# Patient Record
Sex: MALE | Race: BLACK OR AFRICAN AMERICAN | Employment: UNEMPLOYED | URBAN - METROPOLITAN AREA
[De-identification: names, ages, dates, MRNs, and addresses within clinical notes are randomized per-mention and may not be internally consistent; named-entity substitution may affect disease eponyms.]

---

## 2017-02-06 ENCOUNTER — OFFICE VISIT (OUTPATIENT)
Dept: FAMILY MEDICINE CLINIC | Age: 57
End: 2017-02-06

## 2017-02-06 VITALS
DIASTOLIC BLOOD PRESSURE: 80 MMHG | HEART RATE: 50 BPM | WEIGHT: 237.6 LBS | BODY MASS INDEX: 32.18 KG/M2 | HEIGHT: 72 IN | SYSTOLIC BLOOD PRESSURE: 130 MMHG

## 2017-02-06 DIAGNOSIS — I10 ESSENTIAL HYPERTENSION: Primary | ICD-10-CM

## 2017-02-06 DIAGNOSIS — E66.9 OBESITY (BMI 30.0-34.9): ICD-10-CM

## 2017-02-06 DIAGNOSIS — F43.21 GRIEF: ICD-10-CM

## 2017-02-06 DIAGNOSIS — E78.00 PURE HYPERCHOLESTEROLEMIA: ICD-10-CM

## 2017-02-06 DIAGNOSIS — R35.0 URINARY FREQUENCY: ICD-10-CM

## 2017-02-06 PROCEDURE — 99214 OFFICE O/P EST MOD 30 MIN: CPT | Performed by: FAMILY MEDICINE

## 2017-02-06 RX ORDER — FINASTERIDE 5 MG/1
5 TABLET, FILM COATED ORAL DAILY
Qty: 30 TABLET | Refills: 5 | Status: SHIPPED | OUTPATIENT
Start: 2017-02-06 | End: 2017-06-13 | Stop reason: SDUPTHER

## 2017-02-06 RX ORDER — LISINOPRIL 40 MG/1
40 TABLET ORAL DAILY
Qty: 30 TABLET | Refills: 5 | Status: SHIPPED | OUTPATIENT
Start: 2017-02-06 | End: 2017-06-13 | Stop reason: SDUPTHER

## 2017-02-06 RX ORDER — DOXAZOSIN MESYLATE 4 MG/1
4 TABLET ORAL DAILY
Qty: 30 TABLET | Refills: 5 | Status: SHIPPED | OUTPATIENT
Start: 2017-02-06 | End: 2017-06-13 | Stop reason: SDUPTHER

## 2017-02-06 RX ORDER — ATORVASTATIN CALCIUM 10 MG/1
10 TABLET, FILM COATED ORAL DAILY
Qty: 30 TABLET | Refills: 5 | Status: ON HOLD | OUTPATIENT
Start: 2017-02-06 | End: 2017-06-02 | Stop reason: HOSPADM

## 2017-02-06 RX ORDER — METOPROLOL SUCCINATE 200 MG/1
200 TABLET, EXTENDED RELEASE ORAL DAILY
Qty: 30 TABLET | Refills: 5 | Status: SHIPPED | OUTPATIENT
Start: 2017-02-06 | End: 2017-06-21 | Stop reason: DRUGHIGH

## 2017-02-06 ASSESSMENT — ENCOUNTER SYMPTOMS
SHORTNESS OF BREATH: 0
CHEST TIGHTNESS: 0

## 2017-06-05 ENCOUNTER — TELEPHONE (OUTPATIENT)
Dept: FAMILY MEDICINE CLINIC | Age: 57
End: 2017-06-05

## 2017-06-07 ENCOUNTER — OFFICE VISIT (OUTPATIENT)
Dept: FAMILY MEDICINE CLINIC | Age: 57
End: 2017-06-07

## 2017-06-07 VITALS
SYSTOLIC BLOOD PRESSURE: 118 MMHG | WEIGHT: 236.4 LBS | DIASTOLIC BLOOD PRESSURE: 72 MMHG | HEIGHT: 72 IN | HEART RATE: 74 BPM | BODY MASS INDEX: 32.02 KG/M2

## 2017-06-07 DIAGNOSIS — Z09 HOSPITAL DISCHARGE FOLLOW-UP: Primary | ICD-10-CM

## 2017-06-07 DIAGNOSIS — R07.9 CHEST PAIN, UNSPECIFIED TYPE: ICD-10-CM

## 2017-06-07 DIAGNOSIS — I25.10 ASCVD (ARTERIOSCLEROTIC CARDIOVASCULAR DISEASE): ICD-10-CM

## 2017-06-07 PROCEDURE — 99213 OFFICE O/P EST LOW 20 MIN: CPT | Performed by: FAMILY MEDICINE

## 2017-06-07 ASSESSMENT — PATIENT HEALTH QUESTIONNAIRE - PHQ9
SUM OF ALL RESPONSES TO PHQ QUESTIONS 1-9: 0
1. LITTLE INTEREST OR PLEASURE IN DOING THINGS: 0
SUM OF ALL RESPONSES TO PHQ9 QUESTIONS 1 & 2: 0
2. FEELING DOWN, DEPRESSED OR HOPELESS: 0

## 2017-06-13 ENCOUNTER — OFFICE VISIT (OUTPATIENT)
Dept: CARDIOLOGY CLINIC | Age: 57
End: 2017-06-13

## 2017-06-13 VITALS
HEIGHT: 72 IN | WEIGHT: 239.4 LBS | RESPIRATION RATE: 16 BRPM | HEART RATE: 64 BPM | DIASTOLIC BLOOD PRESSURE: 78 MMHG | BODY MASS INDEX: 32.43 KG/M2 | SYSTOLIC BLOOD PRESSURE: 126 MMHG

## 2017-06-13 DIAGNOSIS — I10 ESSENTIAL HYPERTENSION: ICD-10-CM

## 2017-06-13 DIAGNOSIS — R35.0 URINARY FREQUENCY: ICD-10-CM

## 2017-06-13 DIAGNOSIS — E78.5 DYSLIPIDEMIA: ICD-10-CM

## 2017-06-13 DIAGNOSIS — R07.9 CHEST PAIN, UNSPECIFIED TYPE: ICD-10-CM

## 2017-06-13 DIAGNOSIS — I25.118 CORONARY ARTERY DISEASE OF NATIVE ARTERY OF NATIVE HEART WITH STABLE ANGINA PECTORIS (HCC): Primary | ICD-10-CM

## 2017-06-13 PROCEDURE — 99214 OFFICE O/P EST MOD 30 MIN: CPT | Performed by: INTERNAL MEDICINE

## 2017-06-13 PROCEDURE — 93000 ELECTROCARDIOGRAM COMPLETE: CPT | Performed by: INTERNAL MEDICINE

## 2017-06-13 RX ORDER — DOXAZOSIN MESYLATE 4 MG/1
4 TABLET ORAL DAILY
Qty: 30 TABLET | Refills: 5 | Status: ON HOLD | OUTPATIENT
Start: 2017-06-13 | End: 2018-05-23 | Stop reason: HOSPADM

## 2017-06-13 RX ORDER — LISINOPRIL 40 MG/1
40 TABLET ORAL DAILY
Qty: 30 TABLET | Refills: 5 | Status: ON HOLD | OUTPATIENT
Start: 2017-06-13 | End: 2018-05-23

## 2017-06-13 RX ORDER — FINASTERIDE 5 MG/1
5 TABLET, FILM COATED ORAL DAILY
Qty: 30 TABLET | Refills: 5 | Status: ON HOLD | OUTPATIENT
Start: 2017-06-13 | End: 2021-08-26 | Stop reason: SDUPTHER

## 2017-06-13 RX ORDER — ISOSORBIDE MONONITRATE 30 MG/1
30 TABLET, EXTENDED RELEASE ORAL DAILY
Qty: 30 TABLET | Refills: 3 | Status: SHIPPED | OUTPATIENT
Start: 2017-06-13 | End: 2017-08-29 | Stop reason: SDUPTHER

## 2017-06-13 RX ORDER — CLOPIDOGREL BISULFATE 75 MG/1
75 TABLET ORAL DAILY
Qty: 30 TABLET | Refills: 5 | Status: ON HOLD | OUTPATIENT
Start: 2017-06-13 | End: 2018-05-23 | Stop reason: HOSPADM

## 2017-06-19 ENCOUNTER — TELEPHONE (OUTPATIENT)
Dept: CARDIOLOGY CLINIC | Age: 57
End: 2017-06-19

## 2017-06-19 DIAGNOSIS — I10 ESSENTIAL HYPERTENSION: ICD-10-CM

## 2017-06-19 DIAGNOSIS — R07.89 OTHER CHEST PAIN: ICD-10-CM

## 2017-06-19 DIAGNOSIS — E78.5 DYSLIPIDEMIA: ICD-10-CM

## 2017-06-19 DIAGNOSIS — Z98.61 S/P PTCA (PERCUTANEOUS TRANSLUMINAL CORONARY ANGIOPLASTY): Primary | ICD-10-CM

## 2017-06-20 ENCOUNTER — TELEPHONE (OUTPATIENT)
Dept: CARDIOLOGY CLINIC | Age: 57
End: 2017-06-20

## 2017-06-21 ENCOUNTER — NURSE ONLY (OUTPATIENT)
Dept: CARDIOLOGY CLINIC | Age: 57
End: 2017-06-21

## 2017-06-21 VITALS
HEIGHT: 72 IN | HEART RATE: 50 BPM | SYSTOLIC BLOOD PRESSURE: 102 MMHG | BODY MASS INDEX: 32.23 KG/M2 | WEIGHT: 238 LBS | DIASTOLIC BLOOD PRESSURE: 68 MMHG | OXYGEN SATURATION: 97 %

## 2017-06-21 DIAGNOSIS — I10 ESSENTIAL HYPERTENSION: ICD-10-CM

## 2017-06-21 RX ORDER — METOPROLOL SUCCINATE 100 MG/1
200 TABLET, EXTENDED RELEASE ORAL DAILY
Qty: 30 TABLET | Refills: 3 | Status: SHIPPED | OUTPATIENT
Start: 2017-06-21 | End: 2017-07-17 | Stop reason: DRUGHIGH

## 2017-06-27 ENCOUNTER — PROCEDURE VISIT (OUTPATIENT)
Dept: CARDIOLOGY CLINIC | Age: 57
End: 2017-06-27

## 2017-06-27 DIAGNOSIS — R07.89 OTHER CHEST PAIN: ICD-10-CM

## 2017-06-27 DIAGNOSIS — E78.5 DYSLIPIDEMIA: ICD-10-CM

## 2017-06-27 DIAGNOSIS — Z98.61 S/P PTCA (PERCUTANEOUS TRANSLUMINAL CORONARY ANGIOPLASTY): Primary | ICD-10-CM

## 2017-06-27 DIAGNOSIS — I10 ESSENTIAL HYPERTENSION: ICD-10-CM

## 2017-06-27 PROCEDURE — 93015 CV STRESS TEST SUPVJ I&R: CPT | Performed by: INTERNAL MEDICINE

## 2017-06-29 ENCOUNTER — TELEPHONE (OUTPATIENT)
Dept: CARDIOLOGY CLINIC | Age: 57
End: 2017-06-29

## 2017-06-29 DIAGNOSIS — Z98.61 S/P PTCA (PERCUTANEOUS TRANSLUMINAL CORONARY ANGIOPLASTY): Primary | ICD-10-CM

## 2017-07-12 ENCOUNTER — TELEPHONE (OUTPATIENT)
Dept: CARDIOLOGY CLINIC | Age: 57
End: 2017-07-12

## 2017-07-17 ENCOUNTER — OFFICE VISIT (OUTPATIENT)
Dept: CARDIOLOGY CLINIC | Age: 57
End: 2017-07-17

## 2017-07-17 VITALS
BODY MASS INDEX: 32.21 KG/M2 | DIASTOLIC BLOOD PRESSURE: 80 MMHG | SYSTOLIC BLOOD PRESSURE: 132 MMHG | WEIGHT: 237.8 LBS | HEIGHT: 72 IN | HEART RATE: 76 BPM

## 2017-07-17 DIAGNOSIS — I25.118 CORONARY ARTERY DISEASE OF NATIVE ARTERY OF NATIVE HEART WITH STABLE ANGINA PECTORIS (HCC): Primary | ICD-10-CM

## 2017-07-17 DIAGNOSIS — I10 ESSENTIAL HYPERTENSION: ICD-10-CM

## 2017-07-17 DIAGNOSIS — E78.5 DYSLIPIDEMIA: ICD-10-CM

## 2017-07-17 DIAGNOSIS — F32.2 SEVERE SINGLE CURRENT EPISODE OF MAJOR DEPRESSIVE DISORDER, WITHOUT PSYCHOTIC FEATURES (HCC): ICD-10-CM

## 2017-07-17 DIAGNOSIS — R07.89 OTHER CHEST PAIN: ICD-10-CM

## 2017-07-17 PROCEDURE — 99214 OFFICE O/P EST MOD 30 MIN: CPT | Performed by: INTERNAL MEDICINE

## 2017-07-17 RX ORDER — METOPROLOL SUCCINATE 100 MG/1
100 TABLET, EXTENDED RELEASE ORAL DAILY
Qty: 30 TABLET | Refills: 3 | Status: SHIPPED | OUTPATIENT
Start: 2017-07-17 | End: 2017-08-29 | Stop reason: ALTCHOICE

## 2017-07-27 ENCOUNTER — TELEPHONE (OUTPATIENT)
Dept: FAMILY MEDICINE CLINIC | Age: 57
End: 2017-07-27

## 2017-07-31 ENCOUNTER — OFFICE VISIT (OUTPATIENT)
Dept: FAMILY MEDICINE CLINIC | Age: 57
End: 2017-07-31

## 2017-07-31 VITALS
HEART RATE: 74 BPM | HEIGHT: 72 IN | DIASTOLIC BLOOD PRESSURE: 60 MMHG | BODY MASS INDEX: 31.61 KG/M2 | WEIGHT: 233.4 LBS | SYSTOLIC BLOOD PRESSURE: 100 MMHG

## 2017-07-31 DIAGNOSIS — I25.118 CORONARY ARTERY DISEASE OF NATIVE ARTERY OF NATIVE HEART WITH STABLE ANGINA PECTORIS (HCC): ICD-10-CM

## 2017-07-31 DIAGNOSIS — F32.A DEPRESSION, UNSPECIFIED DEPRESSION TYPE: ICD-10-CM

## 2017-07-31 DIAGNOSIS — R25.2 CRAMPS OF LOWER EXTREMITY, UNSPECIFIED LATERALITY: ICD-10-CM

## 2017-07-31 DIAGNOSIS — E66.9 OBESITY (BMI 30.0-34.9): ICD-10-CM

## 2017-07-31 DIAGNOSIS — H61.23 IMPACTED CERUMEN OF BOTH EARS: Primary | ICD-10-CM

## 2017-07-31 DIAGNOSIS — I10 ESSENTIAL HYPERTENSION: ICD-10-CM

## 2017-07-31 PROCEDURE — 69210 REMOVE IMPACTED EAR WAX UNI: CPT | Performed by: FAMILY MEDICINE

## 2017-07-31 PROCEDURE — 99214 OFFICE O/P EST MOD 30 MIN: CPT | Performed by: FAMILY MEDICINE

## 2017-07-31 RX ORDER — AMLODIPINE BESYLATE 5 MG/1
1 TABLET ORAL DAILY
COMMUNITY
Start: 2017-07-01 | End: 2017-08-01 | Stop reason: SDUPTHER

## 2017-07-31 RX ORDER — BUPROPION HYDROCHLORIDE 300 MG/1
300 TABLET ORAL EVERY MORNING
Qty: 30 TABLET | Refills: 0 | Status: SHIPPED | OUTPATIENT
Start: 2017-07-31 | End: 2017-09-29

## 2017-07-31 RX ORDER — BUPROPION HYDROCHLORIDE 300 MG/1
300 TABLET ORAL EVERY MORNING
COMMUNITY
End: 2017-07-31 | Stop reason: SDUPTHER

## 2017-07-31 ASSESSMENT — ENCOUNTER SYMPTOMS: SHORTNESS OF BREATH: 0

## 2017-08-01 RX ORDER — ATORVASTATIN CALCIUM 40 MG/1
40 TABLET, FILM COATED ORAL DAILY
Qty: 30 TABLET | Refills: 2 | Status: SHIPPED | OUTPATIENT
Start: 2017-08-01 | End: 2017-09-29 | Stop reason: SDUPTHER

## 2017-08-01 RX ORDER — AMLODIPINE BESYLATE 5 MG/1
5 TABLET ORAL DAILY
Qty: 30 TABLET | Refills: 2 | Status: SHIPPED | OUTPATIENT
Start: 2017-08-01 | End: 2017-09-29 | Stop reason: SDUPTHER

## 2017-08-09 ENCOUNTER — PROCEDURE VISIT (OUTPATIENT)
Dept: CARDIOLOGY CLINIC | Age: 57
End: 2017-08-09

## 2017-08-09 DIAGNOSIS — R53.83 FATIGUE, UNSPECIFIED TYPE: Primary | ICD-10-CM

## 2017-08-09 DIAGNOSIS — R07.89 OTHER CHEST PAIN: ICD-10-CM

## 2017-08-09 DIAGNOSIS — I10 ESSENTIAL HYPERTENSION: ICD-10-CM

## 2017-08-09 DIAGNOSIS — F32.2 SEVERE SINGLE CURRENT EPISODE OF MAJOR DEPRESSIVE DISORDER, WITHOUT PSYCHOTIC FEATURES (HCC): ICD-10-CM

## 2017-08-09 DIAGNOSIS — E78.5 DYSLIPIDEMIA: ICD-10-CM

## 2017-08-09 DIAGNOSIS — I25.118 CORONARY ARTERY DISEASE OF NATIVE ARTERY OF NATIVE HEART WITH STABLE ANGINA PECTORIS (HCC): ICD-10-CM

## 2017-08-09 LAB
LV EF: 42 %
LV EF: 58 %
LVEF MODALITY: NORMAL
LVEF MODALITY: NORMAL

## 2017-08-09 PROCEDURE — 93306 TTE W/DOPPLER COMPLETE: CPT | Performed by: INTERNAL MEDICINE

## 2017-08-09 PROCEDURE — A9500 TC99M SESTAMIBI: HCPCS | Performed by: INTERNAL MEDICINE

## 2017-08-09 PROCEDURE — 93017 CV STRESS TEST TRACING ONLY: CPT | Performed by: INTERNAL MEDICINE

## 2017-08-09 PROCEDURE — 93016 CV STRESS TEST SUPVJ ONLY: CPT | Performed by: INTERNAL MEDICINE

## 2017-08-09 PROCEDURE — 93018 CV STRESS TEST I&R ONLY: CPT | Performed by: INTERNAL MEDICINE

## 2017-08-09 PROCEDURE — 78452 HT MUSCLE IMAGE SPECT MULT: CPT | Performed by: INTERNAL MEDICINE

## 2017-08-10 ENCOUNTER — TELEPHONE (OUTPATIENT)
Dept: CARDIOLOGY CLINIC | Age: 57
End: 2017-08-10

## 2017-08-16 ENCOUNTER — TELEPHONE (OUTPATIENT)
Dept: CARDIOLOGY CLINIC | Age: 57
End: 2017-08-16

## 2017-08-18 ENCOUNTER — OFFICE VISIT (OUTPATIENT)
Dept: FAMILY MEDICINE CLINIC | Age: 57
End: 2017-08-18

## 2017-08-18 VITALS
HEIGHT: 72 IN | WEIGHT: 236.4 LBS | HEART RATE: 76 BPM | SYSTOLIC BLOOD PRESSURE: 120 MMHG | BODY MASS INDEX: 32.02 KG/M2 | DIASTOLIC BLOOD PRESSURE: 72 MMHG

## 2017-08-18 DIAGNOSIS — I25.118 CORONARY ARTERY DISEASE OF NATIVE ARTERY OF NATIVE HEART WITH STABLE ANGINA PECTORIS (HCC): ICD-10-CM

## 2017-08-18 DIAGNOSIS — M54.31 RIGHT SIDED SCIATICA: Primary | ICD-10-CM

## 2017-08-18 PROCEDURE — 99214 OFFICE O/P EST MOD 30 MIN: CPT | Performed by: FAMILY MEDICINE

## 2017-08-18 RX ORDER — GABAPENTIN 100 MG/1
100 CAPSULE ORAL DAILY
Qty: 30 CAPSULE | Refills: 0 | Status: SHIPPED | OUTPATIENT
Start: 2017-08-18 | End: 2017-09-29

## 2017-08-18 RX ORDER — METHYLPREDNISOLONE 4 MG/1
TABLET ORAL
Qty: 1 TABLET | Refills: 0 | Status: SHIPPED | OUTPATIENT
Start: 2017-08-18 | End: 2017-08-24

## 2017-08-18 ASSESSMENT — ENCOUNTER SYMPTOMS: BACK PAIN: 1

## 2017-08-25 ENCOUNTER — OFFICE VISIT (OUTPATIENT)
Dept: FAMILY MEDICINE CLINIC | Age: 57
End: 2017-08-25

## 2017-08-25 VITALS
HEART RATE: 74 BPM | HEIGHT: 72 IN | DIASTOLIC BLOOD PRESSURE: 80 MMHG | WEIGHT: 239 LBS | BODY MASS INDEX: 32.37 KG/M2 | SYSTOLIC BLOOD PRESSURE: 120 MMHG
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
## 2017-08-25 DIAGNOSIS — M54.41 ACUTE RIGHT-SIDED LOW BACK PAIN WITH RIGHT-SIDED SCIATICA: Primary | ICD-10-CM

## 2017-08-25 DIAGNOSIS — R29.2 ABNORMAL DEEP TENDON REFLEX: ICD-10-CM

## 2017-08-25 DIAGNOSIS — R29.898 RIGHT LEG WEAKNESS: ICD-10-CM

## 2017-08-25 PROCEDURE — 99213 OFFICE O/P EST LOW 20 MIN: CPT | Performed by: FAMILY MEDICINE

## 2017-08-25 RX ORDER — GABAPENTIN 300 MG/1
300 CAPSULE ORAL 3 TIMES DAILY
Qty: 90 CAPSULE | Refills: 0 | Status: SHIPPED | OUTPATIENT
Start: 2017-08-25 | End: 2017-09-29

## 2017-08-25 RX ORDER — TRAMADOL HYDROCHLORIDE 50 MG/1
50 TABLET ORAL EVERY 6 HOURS PRN
Qty: 28 TABLET | Refills: 0 | Status: SHIPPED | OUTPATIENT
Start: 2017-08-25 | End: 2017-09-15 | Stop reason: SDUPTHER

## 2017-08-25 ASSESSMENT — ENCOUNTER SYMPTOMS: BACK PAIN: 1

## 2017-08-29 ENCOUNTER — OFFICE VISIT (OUTPATIENT)
Dept: CARDIOLOGY CLINIC | Age: 57
End: 2017-08-29

## 2017-08-29 ENCOUNTER — TELEPHONE (OUTPATIENT)
Dept: CARDIOLOGY CLINIC | Age: 57
End: 2017-08-29

## 2017-08-29 VITALS
WEIGHT: 239 LBS | OXYGEN SATURATION: 97 % | BODY MASS INDEX: 32.37 KG/M2 | DIASTOLIC BLOOD PRESSURE: 80 MMHG | HEIGHT: 72 IN | SYSTOLIC BLOOD PRESSURE: 134 MMHG | HEART RATE: 70 BPM

## 2017-08-29 DIAGNOSIS — I25.118 CORONARY ARTERY DISEASE OF NATIVE ARTERY OF NATIVE HEART WITH STABLE ANGINA PECTORIS (HCC): ICD-10-CM

## 2017-08-29 DIAGNOSIS — E66.9 OBESITY (BMI 30.0-34.9): ICD-10-CM

## 2017-08-29 DIAGNOSIS — E78.00 PURE HYPERCHOLESTEROLEMIA: ICD-10-CM

## 2017-08-29 DIAGNOSIS — R07.89 OTHER CHEST PAIN: ICD-10-CM

## 2017-08-29 DIAGNOSIS — N40.1 BENIGN NON-NODULAR PROSTATIC HYPERPLASIA WITH LOWER URINARY TRACT SYMPTOMS: ICD-10-CM

## 2017-08-29 DIAGNOSIS — E78.5 DYSLIPIDEMIA: ICD-10-CM

## 2017-08-29 DIAGNOSIS — I10 ESSENTIAL HYPERTENSION: ICD-10-CM

## 2017-08-29 DIAGNOSIS — I25.110 CORONARY ARTERY DISEASE INVOLVING NATIVE CORONARY ARTERY OF NATIVE HEART WITH UNSTABLE ANGINA PECTORIS (HCC): Primary | ICD-10-CM

## 2017-08-29 PROCEDURE — 99214 OFFICE O/P EST MOD 30 MIN: CPT | Performed by: INTERNAL MEDICINE

## 2017-08-29 RX ORDER — METOPROLOL SUCCINATE 50 MG/1
50 TABLET, EXTENDED RELEASE ORAL DAILY
Qty: 30 TABLET | Refills: 3 | Status: SHIPPED | OUTPATIENT
Start: 2017-08-29 | End: 2018-01-24 | Stop reason: SDUPTHER

## 2017-08-29 RX ORDER — ISOSORBIDE MONONITRATE 30 MG/1
60 TABLET, EXTENDED RELEASE ORAL DAILY
Qty: 30 TABLET | Refills: 3 | Status: SHIPPED | OUTPATIENT
Start: 2017-08-29 | End: 2018-01-24 | Stop reason: SDUPTHER

## 2017-08-29 RX ORDER — RANOLAZINE 500 MG/1
500 TABLET, EXTENDED RELEASE ORAL 2 TIMES DAILY
Qty: 60 TABLET | Refills: 3 | Status: SHIPPED | OUTPATIENT
Start: 2017-08-29 | End: 2018-05-03 | Stop reason: SDUPTHER

## 2017-08-30 ENCOUNTER — TELEPHONE (OUTPATIENT)
Dept: FAMILY MEDICINE CLINIC | Age: 57
End: 2017-08-30

## 2017-09-05 ENCOUNTER — TELEPHONE (OUTPATIENT)
Dept: CARDIOLOGY CLINIC | Age: 57
End: 2017-09-05

## 2017-09-05 ENCOUNTER — HOSPITAL ENCOUNTER (OUTPATIENT)
Dept: MRI IMAGING | Age: 57
Discharge: HOME | End: 2017-09-05
Attending: FAMILY MEDICINE | Admitting: FAMILY MEDICINE

## 2017-09-05 DIAGNOSIS — R29.898 RIGHT LEG WEAKNESS: ICD-10-CM

## 2017-09-05 DIAGNOSIS — M54.41 LOW BACK PAIN WITH RIGHT-SIDED SCIATICA: ICD-10-CM
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
## 2017-09-05 DIAGNOSIS — M54.41 ACUTE RIGHT-SIDED LOW BACK PAIN WITH RIGHT-SIDED SCIATICA: ICD-10-CM

## 2017-09-07 PROBLEM — M47.816 OSTEOARTHRITIS OF LUMBAR SPINE: Status: ACTIVE | Noted: 2017-09-07

## 2017-09-08 ENCOUNTER — HOSPITAL ENCOUNTER (OUTPATIENT)
Dept: PHYSICAL THERAPY | Age: 57
Discharge: HOME | End: 2017-09-30
Attending: FAMILY MEDICINE | Admitting: FAMILY MEDICINE

## 2017-09-08 NOTE — FLOWSHEET NOTE
Physical Therapy Daily Treatment Note  Date:  2017    Patient Name:  Arlen Carrero    :  1960  MRN: [id number]  Restrictions/Precautions:  none  Diagnosis: right side low back , right leg weakness, falls     Insurance/Certification information:  [place] [employer] v[employer]  Next Physician Visit:  Referring Physician:  Adali Mcghee Delay  Visit# / total visits: 1 /     10             Initial Pain level:3 to 8 /10     Subjective:    Clinical Presentation: Unsure Pt presents with 3 to 8/10 back and leg pain with weakness of right leg. He has absent right leg DTRs, decreased mm bulk of right calf mm,  weakness of all right leg mm of 3+/5?  ,  Pt moves slowly and his gait changes slightly,  He complains of pain with 40* SLR supine, yet can long sit at 70* SLR motion comfortably/    Pt started with strengthening exs fto back and leg mm  Any changes to Ambulatory Summary Sheet? Goals:     Long term goals  Time Frame for Long term goals : 60 days  Long term goal 1: decrease pain by 50%  Long term goal 2: no falls  Long term goal 3: indep with home program  Long term goal 4: improve oswestry score by 5 points   Patient's goal: less pain and more leg strength  Skilled PT activities: Date 17 #1 Date Date Date     Outcome measure used          On visit#  28 oswestry         Lat pull backs with lat bar  Elbows flexed   Elbows ext  6 plates 41U  5 plates 96Q        Lat pull downs sitting facing out         Pelvic tilts supine        slow marches supine          raising up onto toes   Both leg stance 2x 10 weakness right leg        Step ups 4 inch step each leg         Bike or nu step sci fit?  May add                     Suggested serola SI belt for work at [employer], pt to order on his own if he wants                                              Progress/goals assessment (every 10 visits) by  PT           HEP: Above exs      Objective   findings: seenpresentation      Provider

## 2017-09-08 NOTE — PROGRESS NOTES
Difficult urinating/bowel movements   Taking deep breaths    Sensation    Complaint of numbness or tingling?:     Right side of hip, right lateral calf. AROM     Flexion: 20*   Extension:10*    Left Sidebendin*       Right Sidebendin*     Reflexes   2 left knee,  0 right knee and both ankles    Strength testing Left  Leg 5/5,    All muscle groups are 3+/5for  Right LE,  Good quad and hamstring mm tone and bulk , equal to left. Pt has decreased mm tone and bulk with right gastroc mm. Special Testing:     SLR supine: 30* pain right leg,  SLR sitting 70* no pain  Slump: negative  NOE: stiff complains of pain right   FADIR: stiff complains of pain right hip    Hip Screen: All hip AROM is WNL        Palpation:            Pain noted at: lumbar , sacral iliac,   Pelvic alignment:  Symmetrical height of iliac crests, ASIS, and PSIS                         :   Leg Length:   Equal       Gait:   Pt walks without any assistive device, less weight bearing on  Right leg, walks slowly , knee hyperextends on right  I  Barriers to Learning:    None           Assessment   Conditions Requiring Skilled Therapeutic Intervention  Body structures, Functions, Activity limitations: Decreased functional mobility   Prognosis: Good  Decision Making: Medium Complexity  History: MRI. cardiac history, work and social history  Exam: rang eof motion, strength, gait, posture, DTRs, SLR  Clinical Presentation: Unsure Pt presents with 3 to 8/10 back and leg pain with weakness of right leg.   He has absent right leg DTRs, decreased mm bulk of right calf mm,  weakness of all right leg mm of 3+/5?  ,  Pt moves slowly and his gait changes slightly,  He complains of pain with 40* SLR supine, yet can long sit at 70* SLR motion comfortably/    Pt started with strengthening exs fto back and leg mm  Patient Education: written  Barriers to Learning: none  REQUIRES PT FOLLOW UP: Yes  Activity Tolerance  Activity Tolerance: Patient

## 2017-09-14 ENCOUNTER — HOSPITAL ENCOUNTER (OUTPATIENT)
Dept: PHYSICAL THERAPY | Age: 57
Discharge: HOME | End: 2017-09-14
Admitting: FAMILY MEDICINE

## 2017-09-15 ENCOUNTER — OFFICE VISIT (OUTPATIENT)
Dept: FAMILY MEDICINE CLINIC | Age: 57
End: 2017-09-15

## 2017-09-15 VITALS
HEART RATE: 80 BPM | HEIGHT: 73 IN | BODY MASS INDEX: 31.86 KG/M2 | DIASTOLIC BLOOD PRESSURE: 80 MMHG | WEIGHT: 240.4 LBS | SYSTOLIC BLOOD PRESSURE: 110 MMHG

## 2017-09-15 DIAGNOSIS — R29.898 RIGHT LEG WEAKNESS: ICD-10-CM

## 2017-09-15 DIAGNOSIS — M54.31 RIGHT SIDED SCIATICA: Primary | ICD-10-CM

## 2017-09-15 DIAGNOSIS — M54.2 NECK PAIN ON RIGHT SIDE: ICD-10-CM

## 2017-09-15 DIAGNOSIS — G89.29 OTHER CHRONIC PAIN: ICD-10-CM

## 2017-09-15 PROCEDURE — 99213 OFFICE O/P EST LOW 20 MIN: CPT | Performed by: FAMILY MEDICINE

## 2017-09-15 RX ORDER — METHOCARBAMOL 500 MG/1
500 TABLET, FILM COATED ORAL 4 TIMES DAILY
Qty: 60 TABLET | Refills: 1 | Status: SHIPPED | OUTPATIENT
Start: 2017-09-15 | End: 2018-01-08

## 2017-09-15 RX ORDER — TRAMADOL HYDROCHLORIDE 50 MG/1
50 TABLET ORAL 2 TIMES DAILY
Qty: 60 TABLET | Refills: 0 | Status: SHIPPED | OUTPATIENT
Start: 2017-09-15 | End: 2018-04-26

## 2017-09-15 ASSESSMENT — ENCOUNTER SYMPTOMS: BACK PAIN: 1

## 2017-09-22 ENCOUNTER — OFFICE VISIT (OUTPATIENT)
Dept: FAMILY MEDICINE CLINIC | Age: 57
End: 2017-09-22

## 2017-09-22 VITALS
SYSTOLIC BLOOD PRESSURE: 120 MMHG | BODY MASS INDEX: 32.21 KG/M2 | HEART RATE: 50 BPM | DIASTOLIC BLOOD PRESSURE: 82 MMHG | HEIGHT: 72 IN | WEIGHT: 237.8 LBS

## 2017-09-22 DIAGNOSIS — Z20.2 POSSIBLE EXPOSURE TO STD: Primary | ICD-10-CM

## 2017-09-22 LAB
BILIRUBIN URINE: NEGATIVE
BLOOD, URINE: NEGATIVE
CLARITY: CLEAR
COLOR: ABNORMAL
COMMENT UA: ABNORMAL
EPITHELIAL CELLS, UA: 2 /HPF (ref 0–5)
GLUCOSE URINE: NEGATIVE MG/DL
HAV IGM SER IA-ACNC: NORMAL
HEPATITIS B CORE IGM ANTIBODY: NORMAL
HEPATITIS B SURFACE ANTIGEN INTERPRETATION: NORMAL
HEPATITIS C ANTIBODY INTERPRETATION: NORMAL
HYALINE CASTS: 4 /LPF (ref 0–8)
KETONES, URINE: NEGATIVE MG/DL
LEUKOCYTE ESTERASE, URINE: ABNORMAL
MICROSCOPIC EXAMINATION: YES
NITRITE, URINE: NEGATIVE
PH UA: 6
PROTEIN UA: ABNORMAL MG/DL
RBC UA: 4 /HPF (ref 0–4)
SPECIFIC GRAVITY UA: 1.02
URINE TYPE: ABNORMAL
UROBILINOGEN, URINE: 1 E.U./DL
WBC UA: 21 /HPF (ref 0–5)

## 2017-09-22 PROCEDURE — 36415 COLL VENOUS BLD VENIPUNCTURE: CPT | Performed by: FAMILY MEDICINE

## 2017-09-22 PROCEDURE — 99213 OFFICE O/P EST LOW 20 MIN: CPT | Performed by: FAMILY MEDICINE

## 2017-09-22 PROCEDURE — 81003 URINALYSIS AUTO W/O SCOPE: CPT | Performed by: FAMILY MEDICINE

## 2017-09-23 LAB — RPR: NORMAL

## 2017-09-25 LAB
C. TRACHOMATIS DNA ,URINE: NEGATIVE
HIV-1 AND HIV-2 ANTIBODIES: NORMAL
N. GONORRHOEAE DNA, URINE: NEGATIVE

## 2017-09-29 ENCOUNTER — OFFICE VISIT (OUTPATIENT)
Dept: FAMILY MEDICINE CLINIC | Age: 57
End: 2017-09-29

## 2017-09-29 VITALS
WEIGHT: 243.6 LBS | RESPIRATION RATE: 16 BRPM | HEART RATE: 62 BPM | BODY MASS INDEX: 33.04 KG/M2 | SYSTOLIC BLOOD PRESSURE: 118 MMHG | DIASTOLIC BLOOD PRESSURE: 78 MMHG

## 2017-09-29 DIAGNOSIS — M54.31 RIGHT SIDED SCIATICA: Primary | ICD-10-CM

## 2017-09-29 DIAGNOSIS — I10 ESSENTIAL HYPERTENSION: ICD-10-CM

## 2017-09-29 PROCEDURE — 99213 OFFICE O/P EST LOW 20 MIN: CPT | Performed by: FAMILY MEDICINE

## 2017-09-29 RX ORDER — ATORVASTATIN CALCIUM 40 MG/1
40 TABLET, FILM COATED ORAL DAILY
Qty: 30 TABLET | Refills: 11 | Status: SHIPPED | OUTPATIENT
Start: 2017-09-29 | End: 2018-05-25 | Stop reason: SDUPTHER

## 2017-09-29 RX ORDER — AMLODIPINE BESYLATE 5 MG/1
5 TABLET ORAL DAILY
Qty: 30 TABLET | Refills: 11 | Status: SHIPPED | OUTPATIENT
Start: 2017-09-29 | End: 2018-01-08 | Stop reason: DRUGHIGH

## 2017-09-29 ASSESSMENT — ENCOUNTER SYMPTOMS: BACK PAIN: 1

## 2017-10-01 ENCOUNTER — HOSPITAL ENCOUNTER (OUTPATIENT)
Dept: OTHER | Age: 57
Discharge: HOME | End: 2017-10-31
Attending: FAMILY MEDICINE | Admitting: FAMILY MEDICINE

## 2018-01-08 ENCOUNTER — OFFICE VISIT (OUTPATIENT)
Dept: FAMILY MEDICINE CLINIC | Age: 58
End: 2018-01-08

## 2018-01-08 VITALS
HEIGHT: 73 IN | WEIGHT: 250 LBS | DIASTOLIC BLOOD PRESSURE: 88 MMHG | HEART RATE: 68 BPM | SYSTOLIC BLOOD PRESSURE: 140 MMHG | BODY MASS INDEX: 33.13 KG/M2

## 2018-01-08 DIAGNOSIS — E78.00 PURE HYPERCHOLESTEROLEMIA: ICD-10-CM

## 2018-01-08 DIAGNOSIS — E66.9 OBESITY (BMI 30.0-34.9): ICD-10-CM

## 2018-01-08 DIAGNOSIS — R63.5 WEIGHT GAIN: ICD-10-CM

## 2018-01-08 DIAGNOSIS — I25.110 CORONARY ARTERY DISEASE INVOLVING NATIVE CORONARY ARTERY OF NATIVE HEART WITH UNSTABLE ANGINA PECTORIS (HCC): ICD-10-CM

## 2018-01-08 DIAGNOSIS — R73.09 ELEVATED GLUCOSE: ICD-10-CM

## 2018-01-08 DIAGNOSIS — R35.0 URINARY FREQUENCY: ICD-10-CM

## 2018-01-08 DIAGNOSIS — I10 ESSENTIAL HYPERTENSION: Primary | ICD-10-CM

## 2018-01-08 LAB
BILIRUBIN, POC: NORMAL
BLOOD URINE, POC: NORMAL
CLARITY, POC: CLEAR
COLOR, POC: YELLOW
GLUCOSE URINE, POC: 100
HBA1C MFR BLD: 5.6 %
KETONES, POC: NORMAL
LEUKOCYTE EST, POC: NORMAL
NITRITE, POC: NORMAL
PH, POC: 5.5
PROTEIN, POC: 30
SPECIFIC GRAVITY, POC: 1.02
UROBILINOGEN, POC: 0.2

## 2018-01-08 PROCEDURE — 36415 COLL VENOUS BLD VENIPUNCTURE: CPT | Performed by: FAMILY MEDICINE

## 2018-01-08 PROCEDURE — 83036 HEMOGLOBIN GLYCOSYLATED A1C: CPT | Performed by: FAMILY MEDICINE

## 2018-01-08 PROCEDURE — 81003 URINALYSIS AUTO W/O SCOPE: CPT | Performed by: FAMILY MEDICINE

## 2018-01-08 PROCEDURE — 99214 OFFICE O/P EST MOD 30 MIN: CPT | Performed by: FAMILY MEDICINE

## 2018-01-08 RX ORDER — AMLODIPINE BESYLATE 10 MG/1
10 TABLET ORAL DAILY
Qty: 30 TABLET | Refills: 2 | Status: SHIPPED | OUTPATIENT
Start: 2018-01-08 | End: 2018-04-26 | Stop reason: SDUPTHER

## 2018-01-08 ASSESSMENT — ENCOUNTER SYMPTOMS: SHORTNESS OF BREATH: 0

## 2018-01-08 NOTE — PATIENT INSTRUCTIONS
A serving is 1 slice of bread, 1 ounce of dry cereal, or ½ cup of cooked rice, pasta, or cooked cereal. Try to choose whole-grain products as much as possible. · Limit lean meat, poultry, and fish to 2 servings each day. A serving is 3 ounces, about the size of a deck of cards. · Eat 4 to 5 servings of nuts, seeds, and legumes (cooked dried beans, lentils, and split peas) each week. A serving is 1/3 cup of nuts, 2 tablespoons of seeds, or ½ cup of cooked beans or peas. · Limit fats and oils to 2 to 3 servings each day. A serving is 1 teaspoon of vegetable oil or 2 tablespoons of salad dressing. · Limit sweets and added sugars to 5 servings or less a week. A serving is 1 tablespoon jelly or jam, ½ cup sorbet, or 1 cup of lemonade. · Eat less than 2,300 milligrams (mg) of sodium a day. If you limit your sodium to 1,500 mg a day, you can lower your blood pressure even more. Tips for success  · Start small. Do not try to make dramatic changes to your diet all at once. You might feel that you are missing out on your favorite foods and then be more likely to not follow the plan. Make small changes, and stick with them. Once those changes become habit, add a few more changes. · Try some of the following:  ¨ Make it a goal to eat a fruit or vegetable at every meal and at snacks. This will make it easy to get the recommended amount of fruits and vegetables each day. ¨ Try yogurt topped with fruit and nuts for a snack or healthy dessert. ¨ Add lettuce, tomato, cucumber, and onion to sandwiches. ¨ Combine a ready-made pizza crust with low-fat mozzarella cheese and lots of vegetable toppings. Try using tomatoes, squash, spinach, broccoli, carrots, cauliflower, and onions. ¨ Have a variety of cut-up vegetables with a low-fat dip as an appetizer instead of chips and dip. ¨ Sprinkle sunflower seeds or chopped almonds over salads. Or try adding chopped walnuts or almonds to cooked vegetables.   ¨ Try some vegetarian

## 2018-01-08 NOTE — PROGRESS NOTES
tablet by mouth 2 times daily 60 tablet 3    doxazosin (CARDURA) 4 MG tablet Take 1 tablet by mouth daily 30 tablet 5    finasteride (PROSCAR) 5 MG tablet Take 1 tablet by mouth daily 30 tablet 5    lisinopril (PRINIVIL;ZESTRIL) 40 MG tablet Take 1 tablet by mouth daily 30 tablet 5    clopidogrel (PLAVIX) 75 MG tablet Take 1 tablet by mouth daily 30 tablet 5    nitroGLYCERIN (NITROSTAT) 0.4 MG SL tablet up to max of 3 total doses. If no relief after 1 dose, call 911. 25 tablet 3    Multiple Vitamins-Minerals (MENS MULTIPLE VITAMIN/LYCOPENE) TABS Take  by mouth.  aspirin 81 MG tablet Take 81 mg by mouth daily. No current facility-administered medications on file prior to visit. Objective:   Physical Exam   Constitutional: He appears well-developed and well-nourished. Obese   HENT:   Head: Normocephalic and atraumatic. Cardiovascular: Normal rate, regular rhythm and normal heart sounds. Pulmonary/Chest: Breath sounds normal. No respiratory distress. He has no wheezes. He has no rales. Genitourinary: Rectum normal and prostate normal. Rectal exam shows no external hemorrhoid, no internal hemorrhoid, no fissure, no mass, no tenderness and anal tone normal.   Psychiatric: He has a normal mood and affect. Vitals:    01/08/18 1018 01/08/18 1022   BP: (!) 140/90 (!) 140/88   Pulse: 68    Weight: 250 lb (113.4 kg)    Height: 6' 1\" (1.854 m)      Body mass index is 32.98 kg/m².      Wt Readings from Last 3 Encounters:   01/08/18 250 lb (113.4 kg)   09/29/17 243 lb 9.6 oz (110.5 kg)   09/22/17 237 lb 12.8 oz (107.9 kg)     BP Readings from Last 3 Encounters:   01/08/18 (!) 140/88   09/29/17 118/78   09/22/17 120/82          Results for orders placed or performed in visit on 01/08/18   POCT Urinalysis No Micro (Auto)   Result Value Ref Range    Color, UA yellow     Clarity, UA clear     Glucose, UA      Bilirubin, UA neg     Ketones, UA neg     Spec Grav, UA 1.020 383     P Axis 08/19/2017 51     R Axis 08/19/2017 38     T Axis 08/19/2017 69     Diagnosis 08/19/2017                      Value:Sinus bradycardia  Nonspecific T wave abnormality  Abnormal ECG  When compared with ECG of 01-JUN-2017 06:40,  No significant change was found  Confirmed by Marcella Hookre MD, [facility] [id number]) on 8/16/2017 11:49:37 AM      WBC 08/16/2017 6.8     RBC 08/16/2017 4.43*    Hemoglobin 08/16/2017 13.6     Hematocrit 08/16/2017 40.7*    MCV 08/16/2017 91.9     MCH 08/16/2017 30.7     MCHC 08/16/2017 33.4     RDW 08/16/2017 14.2     Platelets 94/06/0473 207     MPV 08/16/2017 9.8     Differential Type 08/16/2017 AUTOMATED DIFFERENTIAL     Segs Relative 08/16/2017 60.8     Lymphocytes % 08/16/2017 22.3*    Monocytes % 08/16/2017 13.4*    Eosinophils % 08/16/2017 2.9     Basophils % 08/16/2017 0.3     Segs Absolute 08/16/2017 4.1     Lymphocytes # 08/16/2017 1.5     Monocytes # 08/16/2017 0.9     Eosinophils # 08/16/2017 0.2     Basophils # 08/16/2017 0.0     Nucleated RBC % 08/16/2017 0.0     Total Nucleated RBC 08/16/2017 0.0     Total Immature Neutrophil 08/16/2017 0.02     Immature Neutrophil % 08/16/2017 0.3     Sodium 08/16/2017 140     Potassium 08/16/2017 4.0     Chloride 08/16/2017 106     CO2 08/16/2017 23     BUN 08/16/2017 18     CREATININE 08/16/2017 1.6*    Glucose 08/16/2017 144*    Calcium 08/16/2017 8.8     Alb 08/16/2017 3.9     Total Protein 08/16/2017 6.6     Total Bilirubin 08/16/2017 0.3     ALT 08/16/2017 37     AST 08/16/2017 23     Alkaline Phosphatase 08/16/2017 92     GFR Non- 08/16/2017 45*    GFR  08/16/2017 54*    Anion Gap 08/16/2017 11     Troponin T 08/16/2017 <0.010     Protime 08/16/2017 12.6*    INR 08/16/2017 1.10     aPTT 08/16/2017 33.8*       Results for orders placed or performed in visit on 01/08/18   POCT Urinalysis No Micro (Auto)   Result Value Ref Range    Color, UA yellow Clarity, UA clear     Glucose, UA      Bilirubin, UA neg     Ketones, UA neg     Spec Grav, UA 1.020     Blood, UA POC small     pH, UA 5.5     Protein, UA POC 30     Urobilinogen, UA 0.2     Leukocytes, UA trace     Nitrite, UA neg          Assessment:      1. Essential hypertension  amLODIPine (NORVASC) 10 MG tablet   2. Urinary frequency  POCT Urinalysis No Micro (Auto)    PSA, TOTAL AND FREE    POCT glycosylated hemoglobin (Hb A1C)    URINE CULTURE   3. Weight gain     4. Obesity (BMI 30.0-34.9)     5. Elevated glucose  POCT glycosylated hemoglobin (Hb A1C)   6. Pure hypercholesterolemia     7. Coronary artery disease involving native coronary artery of native heart with unstable angina pectoris ([facility] [address]             Plan:      Blood pressure elevated today. Probably due to his weight gain. Discussed nutrition. Amlodipine dose increased     Needs to avoid fast foods. Must lose some weight. Recommend about 20 pounds. See orders  Continue his statin medication and follow-up with cardiologist    We'll check urine culture to make sure there is no infection.

## 2018-01-09 LAB — URINE CULTURE, ROUTINE: NORMAL

## 2018-01-10 LAB
PROSTATE SPECIFIC ANTIGEN FREE: 0.4 UG/L
PROSTATE SPECIFIC ANTIGEN PERCENT FREE: 19 %
PROSTATE SPECIFIC ANTIGEN: 2.1 UG/L (ref 0–4)

## 2018-01-15 ENCOUNTER — TELEPHONE (OUTPATIENT)
Dept: CARDIOLOGY CLINIC | Age: 58
End: 2018-01-15

## 2018-01-15 NOTE — TELEPHONE ENCOUNTER
Marga Israel DDS sent a letter that the patient needs to have 10 teeth extracted with a partial placement. Please advise how you want his blood thinner medication to be managed prior to these extractions and if you feel there is any restrictions or risk to this procedure being performed. Patient is taking aspirin and plavix. Fax letter to Marga Israel DDS at [phone].

## 2018-01-19 ENCOUNTER — PATIENT MESSAGE (OUTPATIENT)
Dept: FAMILY MEDICINE CLINIC | Age: 58
End: 2018-01-19

## 2018-01-24 ENCOUNTER — OFFICE VISIT (OUTPATIENT)
Dept: CARDIOLOGY CLINIC | Age: 58
End: 2018-01-24

## 2018-01-24 VITALS
DIASTOLIC BLOOD PRESSURE: 84 MMHG | SYSTOLIC BLOOD PRESSURE: 132 MMHG | HEART RATE: 52 BPM | WEIGHT: 252 LBS | BODY MASS INDEX: 34.13 KG/M2 | HEIGHT: 72 IN

## 2018-01-24 DIAGNOSIS — R07.2 PRECORDIAL PAIN: ICD-10-CM

## 2018-01-24 DIAGNOSIS — G89.29 OTHER CHRONIC PAIN: ICD-10-CM

## 2018-01-24 DIAGNOSIS — I10 ESSENTIAL HYPERTENSION: ICD-10-CM

## 2018-01-24 DIAGNOSIS — Z01.810 PRE-OPERATIVE CARDIOVASCULAR EXAMINATION: ICD-10-CM

## 2018-01-24 DIAGNOSIS — E78.5 DYSLIPIDEMIA: ICD-10-CM

## 2018-01-24 DIAGNOSIS — E78.00 PURE HYPERCHOLESTEROLEMIA: ICD-10-CM

## 2018-01-24 DIAGNOSIS — I25.118 CORONARY ARTERY DISEASE OF NATIVE ARTERY OF NATIVE HEART WITH STABLE ANGINA PECTORIS (HCC): Primary | ICD-10-CM

## 2018-01-24 PROCEDURE — 93000 ELECTROCARDIOGRAM COMPLETE: CPT | Performed by: INTERNAL MEDICINE

## 2018-01-24 PROCEDURE — 99214 OFFICE O/P EST MOD 30 MIN: CPT | Performed by: INTERNAL MEDICINE

## 2018-01-24 RX ORDER — ISOSORBIDE MONONITRATE 120 MG/1
120 TABLET, EXTENDED RELEASE ORAL DAILY
Qty: 30 TABLET | Refills: 3 | Status: ON HOLD | OUTPATIENT
Start: 2018-01-24 | End: 2018-05-23

## 2018-01-24 RX ORDER — METOPROLOL SUCCINATE 25 MG/1
25 TABLET, EXTENDED RELEASE ORAL DAILY
Qty: 30 TABLET | Refills: 3 | Status: SHIPPED | OUTPATIENT
Start: 2018-01-24 | End: 2018-05-07 | Stop reason: ALTCHOICE

## 2018-01-24 NOTE — PROGRESS NOTES
CARDIOLOGY  NOTE  Chief Complaint: Follow-up after  Hospital stay . HPI:   Anup Vargas is a 61y.o. year old who has history as noted below. He is working part time . HE has chest pain episodes off and on for last 2 weeks ago. He says it got better with rest and nitro which helped . HE is planned for dental extraction. Cath  In 2017 August Showed stable coronary artery disease with no significant change compared to previous cardiac cath in June 2017  . HE is tired and fatigued. Job asked him to take leave because he is unable to work . He had  Treadmill stress test but could not reach target heart rate. He has no energy to do any exercise . HE says he ha slow sex drive which worries him a lot . HE has 2 girlfriends and he is finding that he is tired     Anup Vargas was  Initially seen in the hospital and underwent RCA intervention in June 2017  concerning for unstable angina. Interestingly did have a stress testing inferior wall ischemia. He does have persistent small vessel  OM branch disease.       Current Outpatient Prescriptions   Medication Sig Dispense Refill    metoprolol succinate (TOPROL XL) 25 MG extended release tablet Take 1 tablet by mouth daily 30 tablet 3    isosorbide mononitrate (IMDUR) 120 MG extended release tablet Take 1 tablet by mouth daily 30 tablet 3    amLODIPine (NORVASC) 10 MG tablet Take 1 tablet by mouth daily Changing from 5mg 30 tablet 2    atorvastatin (LIPITOR) 40 MG tablet Take 1 tablet by mouth daily 30 tablet 11    traMADol (ULTRAM) 50 MG tablet Take 1 tablet by mouth 2 times daily 60 tablet 0    ranolazine (RANEXA) 500 MG extended release tablet Take 1 tablet by mouth 2 times daily 60 tablet 3    doxazosin (CARDURA) 4 MG tablet Take 1 tablet by mouth daily 30 tablet 5    finasteride (PROSCAR) 5 MG tablet Take 1 tablet by mouth daily 30 tablet 5    lisinopril (PRINIVIL;ZESTRIL) 40 MG tablet Take 1 tablet by mouth daily 30 tablet 5    normal, No discharge. Respiratory:  Normal breath sounds, No respiratory distress, No wheezing, No chest tenderness. ,no use of accessory muscles,   Cardiovascular: (PMI) apex non displaced,no lifts no thrills,S1 and S2 audible, No added heart sounds, No signs of ankle edema, or volume overload, No evidence of JVD  GI:  Bowel sounds normal, Soft, No tenderness, No masses, No gross visceromegaly   :  No costovertebral angle tenderness   Musculoskeletal:  No edema, no tenderness, no deformities. Back- no tenderness  Integument:  Well hydrated, no rash   Lymphatic:  No lymphadenopathy noted   Neurologic:  Alert & oriented x 3, CN 2-12 normal, normal motor function, normal sensory function, no focal deficits noted   Psychiatric:  Speech and behavior appropriate       Medical decision making and Data review:  DATA:  Lab Results   Component Value Date    TROPONINT <0.010 08/16/2017     BNP:  No results found for: PROBNP  PT/INR:  No results found for: PTINR  No results found for: LABA1C  Lab Results   Component Value Date    CHOL 161 06/01/2017    TRIG 106 06/01/2017    HDL 28 (L) 06/01/2017    LDLCALC 141 (H) 08/04/2016    LDLDIRECT 117 (H) 06/01/2017     Lab Results   Component Value Date    ALT 37 08/16/2017    AST 23 08/16/2017     TSH:   Lab Results   Component Value Date    TSH 1.08 10/22/2014   Stress test 6/1/17  Summary    Abnormal Study.    Mild to moderate inferior wall Ischemia of a large area.    Dilated Cardiomyopathy with mildly reduced LV function.     LVEF 43 %.         Cath Conclusions 6/1/2017      Procedure Summary   s/p DAI to RCA for 90 % stenosis reduced to 0 % stenosis using a   Xience 2.7 X 32 stent followed by post stent balloon dilation   using a 2.75 X 15 balloon inflated to 20 sharon   LAD has mid 60 to 70 % stenosis   Circumflex is patent but distal OM branch has 90 % stenosis (   very small vessel )  LMCA: Normal.  LAD: Abnormal.mid segment has 50% to 60 % stenosis  LCx: Abnormal.distal OM 1 branch has 90 % stenosis, small calibre  RCA: Abnormal.mid RCA 90 % stenosis , proximal RCa has 20% to 30 % stenosis     Nuclear stress test 8/9/17  Abnormal study    Normal EF 42 % with normal ventricular contractility , normal wall motion    Medium size, Mild to moderate inferior ischemia noted .    Abnormal stress images but increased GI Uptake in stress images         Cath 8/6/17  Procedure Summary   1. Mild to Moderate 3 vessel CAD, except a tiny sub branch of   last OM which is sub totally occluded with left to left   collaterals.   2. RCA stent is widely patent.   3. LV angiography not done due to abnormal Cr. LMCA: Normal.  LAD: Single stenosis. 50 % mid vessel stenosis. LCx: Single stenosis. 50 % Ostial stenosis.     Lesion on 3rd Ob Shelly: Distal subsection. 99% stenosis. Pre procedure DB I    flow was noted.     Comments:Very small caliber sub branch with left to left collaterals   RCA: Focal stenosis. 40 % ostial stenosisThere is a previous stent on Mid RCA. There is a previous stent on Mid RCA Distal subsection showing wide patency. All labs, medications and tests reviewed by myself including data and history from outside source , patient and available family . Assessment & Plan:      1. Coronary artery disease of native artery of native heart with stable angina pectoris ([address]    2. Precordial pain    3. Pre-operative cardiovascular examination    4. Essential hypertension    5. Other chronic pain    6. Pure hypercholesterolemia    7. Dyslipidemia         Coronary artery disease of native artery of native heart with stable angina pectoris ([address]  He has improvement in his class III angina but I think he is too tired and fatigued because of intolerance from metoprolol . The OM subbranch is very small. We will further reduce  his metroprolol to 50 mg daily during his last visit . ( I think his OM branch is probably responsible for symptoms and not LAD) . Increase  Imdur 120 mg daily.   continue

## 2018-04-05 ENCOUNTER — TELEPHONE (OUTPATIENT)
Dept: GASTROENTEROLOGY | Age: 58
End: 2018-04-05

## 2018-04-05 ENCOUNTER — TELEPHONE (OUTPATIENT)
Dept: FAMILY MEDICINE CLINIC | Age: 58
End: 2018-04-05

## 2018-04-11 PROBLEM — Z01.810 PRE-OPERATIVE CARDIOVASCULAR EXAMINATION: Status: RESOLVED | Noted: 2018-01-24 | Resolved: 2018-04-11

## 2018-04-26 ENCOUNTER — OFFICE VISIT (OUTPATIENT)
Dept: FAMILY MEDICINE CLINIC | Age: 58
End: 2018-04-26

## 2018-04-26 ENCOUNTER — TELEPHONE (OUTPATIENT)
Dept: FAMILY MEDICINE CLINIC | Age: 58
End: 2018-04-26

## 2018-04-26 VITALS
SYSTOLIC BLOOD PRESSURE: 140 MMHG | HEIGHT: 73 IN | WEIGHT: 249.4 LBS | BODY MASS INDEX: 33.05 KG/M2 | HEART RATE: 68 BPM | DIASTOLIC BLOOD PRESSURE: 100 MMHG

## 2018-04-26 DIAGNOSIS — I10 ESSENTIAL HYPERTENSION: Primary | ICD-10-CM

## 2018-04-26 DIAGNOSIS — E66.9 OBESITY (BMI 30.0-34.9): ICD-10-CM

## 2018-04-26 DIAGNOSIS — I25.110 CORONARY ARTERY DISEASE INVOLVING NATIVE CORONARY ARTERY OF NATIVE HEART WITH UNSTABLE ANGINA PECTORIS (HCC): ICD-10-CM

## 2018-04-26 PROCEDURE — 99213 OFFICE O/P EST LOW 20 MIN: CPT | Performed by: FAMILY MEDICINE

## 2018-04-26 RX ORDER — AMLODIPINE BESYLATE 10 MG/1
10 TABLET ORAL DAILY
Qty: 30 TABLET | Refills: 2 | Status: ON HOLD | OUTPATIENT
Start: 2018-04-26 | End: 2018-05-23 | Stop reason: HOSPADM

## 2018-04-26 ASSESSMENT — ENCOUNTER SYMPTOMS
SHORTNESS OF BREATH: 0
CHEST TIGHTNESS: 0

## 2018-05-04 RX ORDER — RANOLAZINE 500 MG/1
500 TABLET, FILM COATED, EXTENDED RELEASE ORAL 2 TIMES DAILY
Qty: 60 TABLET | Refills: 3 | Status: SHIPPED | OUTPATIENT
Start: 2018-05-04 | End: 2018-05-07 | Stop reason: ALTCHOICE

## 2018-05-07 ENCOUNTER — OFFICE VISIT (OUTPATIENT)
Dept: CARDIOLOGY CLINIC | Age: 58
End: 2018-05-07

## 2018-05-07 VITALS
WEIGHT: 249.6 LBS | SYSTOLIC BLOOD PRESSURE: 140 MMHG | HEIGHT: 72 IN | HEART RATE: 69 BPM | DIASTOLIC BLOOD PRESSURE: 90 MMHG | BODY MASS INDEX: 33.81 KG/M2

## 2018-05-07 DIAGNOSIS — R07.2 PRECORDIAL PAIN: ICD-10-CM

## 2018-05-07 DIAGNOSIS — E78.5 DYSLIPIDEMIA: ICD-10-CM

## 2018-05-07 DIAGNOSIS — I25.110 CORONARY ARTERY DISEASE INVOLVING NATIVE CORONARY ARTERY OF NATIVE HEART WITH UNSTABLE ANGINA PECTORIS (HCC): ICD-10-CM

## 2018-05-07 DIAGNOSIS — I25.118 CORONARY ARTERY DISEASE OF NATIVE ARTERY OF NATIVE HEART WITH STABLE ANGINA PECTORIS (HCC): ICD-10-CM

## 2018-05-07 DIAGNOSIS — R07.9 CHEST PAIN, UNSPECIFIED TYPE: Primary | ICD-10-CM

## 2018-05-07 DIAGNOSIS — I10 ESSENTIAL HYPERTENSION: ICD-10-CM

## 2018-05-07 DIAGNOSIS — G89.29 OTHER CHRONIC PAIN: ICD-10-CM

## 2018-05-07 DIAGNOSIS — E78.00 PURE HYPERCHOLESTEROLEMIA: ICD-10-CM

## 2018-05-07 DIAGNOSIS — F32.A DEPRESSION, UNSPECIFIED DEPRESSION TYPE: ICD-10-CM

## 2018-05-07 PROCEDURE — 99214 OFFICE O/P EST MOD 30 MIN: CPT | Performed by: INTERNAL MEDICINE

## 2018-05-07 PROCEDURE — 93000 ELECTROCARDIOGRAM COMPLETE: CPT | Performed by: INTERNAL MEDICINE

## 2018-05-07 RX ORDER — RANOLAZINE 1000 MG/1
500 TABLET, EXTENDED RELEASE ORAL 2 TIMES DAILY
Qty: 60 TABLET | Refills: 3 | Status: ON HOLD | OUTPATIENT
Start: 2018-05-07 | End: 2018-05-23

## 2018-05-18 ENCOUNTER — TELEPHONE (OUTPATIENT)
Dept: CARDIOLOGY CLINIC | Age: 58
End: 2018-05-18

## 2018-05-21 ENCOUNTER — TELEPHONE (OUTPATIENT)
Dept: CARDIOLOGY CLINIC | Age: 58
End: 2018-05-21

## 2018-05-21 PROBLEM — I20.0 UNSTABLE ANGINA (HCC): Status: ACTIVE | Noted: 2018-05-21

## 2018-05-23 ENCOUNTER — TELEPHONE (OUTPATIENT)
Dept: FAMILY MEDICINE CLINIC | Age: 58
End: 2018-05-23

## 2018-05-23 NOTE — TELEPHONE ENCOUNTER
I left a message for patient to call for an appointment. Will see for follow up after he sees cardiologist on 06-06-18. Will be past \"transitional\" care time but will be for hospital follow up.

## 2018-05-25 ENCOUNTER — TELEPHONE (OUTPATIENT)
Dept: CARDIOLOGY CLINIC | Age: 58
End: 2018-05-25

## 2018-05-25 ENCOUNTER — NURSE ONLY (OUTPATIENT)
Dept: CARDIOLOGY CLINIC | Age: 58
End: 2018-05-25

## 2018-05-25 VITALS
SYSTOLIC BLOOD PRESSURE: 122 MMHG | HEART RATE: 72 BPM | WEIGHT: 249.8 LBS | DIASTOLIC BLOOD PRESSURE: 78 MMHG | HEIGHT: 72 IN | BODY MASS INDEX: 33.83 KG/M2

## 2018-05-25 DIAGNOSIS — I25.110 CORONARY ARTERY DISEASE INVOLVING NATIVE CORONARY ARTERY OF NATIVE HEART WITH UNSTABLE ANGINA PECTORIS (HCC): Primary | ICD-10-CM

## 2018-05-25 DIAGNOSIS — E78.5 DYSLIPIDEMIA: ICD-10-CM

## 2018-05-25 DIAGNOSIS — F32.A DEPRESSION, UNSPECIFIED DEPRESSION TYPE: ICD-10-CM

## 2018-05-25 DIAGNOSIS — I10 ESSENTIAL HYPERTENSION: ICD-10-CM

## 2018-05-25 DIAGNOSIS — E78.00 PURE HYPERCHOLESTEROLEMIA: ICD-10-CM

## 2018-05-25 PROBLEM — I20.0 UNSTABLE ANGINA (HCC): Status: RESOLVED | Noted: 2018-05-21 | Resolved: 2018-05-25

## 2018-05-25 PROCEDURE — 99214 OFFICE O/P EST MOD 30 MIN: CPT | Performed by: INTERNAL MEDICINE

## 2018-05-25 RX ORDER — NITROGLYCERIN 0.4 MG/1
TABLET SUBLINGUAL
Qty: 25 TABLET | Refills: 3 | Status: ON HOLD | OUTPATIENT
Start: 2018-05-25 | End: 2021-08-26 | Stop reason: SDUPTHER

## 2018-05-25 RX ORDER — ATORVASTATIN CALCIUM 40 MG/1
40 TABLET, FILM COATED ORAL DAILY
Qty: 30 TABLET | Refills: 11 | Status: SHIPPED | OUTPATIENT
Start: 2018-05-25 | End: 2018-07-02

## 2018-05-25 RX ORDER — RANOLAZINE 1000 MG/1
1000 TABLET, EXTENDED RELEASE ORAL 2 TIMES DAILY
Qty: 60 TABLET | Refills: 5 | Status: CANCELLED | OUTPATIENT
Start: 2018-05-25

## 2018-05-25 RX ORDER — METOPROLOL TARTRATE 50 MG/1
50 TABLET, FILM COATED ORAL 2 TIMES DAILY
Qty: 60 TABLET | Refills: 5 | Status: SHIPPED | OUTPATIENT
Start: 2018-05-25 | End: 2020-02-10 | Stop reason: SDUPTHER

## 2018-05-25 RX ORDER — ISOSORBIDE MONONITRATE 60 MG/1
60 TABLET, EXTENDED RELEASE ORAL DAILY
Qty: 30 TABLET | Refills: 5 | Status: CANCELLED | OUTPATIENT
Start: 2018-05-25

## 2018-05-25 RX ORDER — LISINOPRIL 10 MG/1
10 TABLET ORAL DAILY
Qty: 30 TABLET | Refills: 3 | Status: SHIPPED | OUTPATIENT
Start: 2018-05-25 | End: 2018-10-02 | Stop reason: SDUPTHER

## 2018-05-25 RX ORDER — LISINOPRIL 20 MG/1
20 TABLET ORAL DAILY
Qty: 30 TABLET | Refills: 5 | Status: CANCELLED | OUTPATIENT
Start: 2018-05-25

## 2018-05-26 ENCOUNTER — TELEPHONE (OUTPATIENT)
Dept: CARDIOLOGY CLINIC | Age: 58
End: 2018-05-26

## 2018-05-31 ENCOUNTER — PROCEDURE VISIT (OUTPATIENT)
Dept: CARDIOLOGY CLINIC | Age: 58
End: 2018-05-31

## 2018-05-31 VITALS
BODY MASS INDEX: 33.72 KG/M2 | DIASTOLIC BLOOD PRESSURE: 60 MMHG | WEIGHT: 249 LBS | SYSTOLIC BLOOD PRESSURE: 114 MMHG | HEIGHT: 72 IN | HEART RATE: 48 BPM

## 2018-05-31 DIAGNOSIS — E78.5 DYSLIPIDEMIA: ICD-10-CM

## 2018-05-31 DIAGNOSIS — F32.A DEPRESSION, UNSPECIFIED DEPRESSION TYPE: ICD-10-CM

## 2018-05-31 DIAGNOSIS — E78.00 PURE HYPERCHOLESTEROLEMIA: ICD-10-CM

## 2018-05-31 DIAGNOSIS — I25.110 CORONARY ARTERY DISEASE INVOLVING NATIVE CORONARY ARTERY OF NATIVE HEART WITH UNSTABLE ANGINA PECTORIS (HCC): ICD-10-CM

## 2018-05-31 DIAGNOSIS — I10 ESSENTIAL HYPERTENSION: ICD-10-CM

## 2018-05-31 DIAGNOSIS — Z98.61 S/P PTCA (PERCUTANEOUS TRANSLUMINAL CORONARY ANGIOPLASTY): ICD-10-CM

## 2018-05-31 PROCEDURE — 93015 CV STRESS TEST SUPVJ I&R: CPT | Performed by: INTERNAL MEDICINE

## 2018-06-08 ENCOUNTER — OFFICE VISIT (OUTPATIENT)
Dept: CARDIOLOGY CLINIC | Age: 58
End: 2018-06-08

## 2018-06-08 VITALS
SYSTOLIC BLOOD PRESSURE: 120 MMHG | WEIGHT: 250 LBS | BODY MASS INDEX: 33.86 KG/M2 | HEART RATE: 60 BPM | HEIGHT: 72 IN | DIASTOLIC BLOOD PRESSURE: 82 MMHG

## 2018-06-08 DIAGNOSIS — F32.A DEPRESSION, UNSPECIFIED DEPRESSION TYPE: ICD-10-CM

## 2018-06-08 DIAGNOSIS — R35.0 URINARY FREQUENCY: ICD-10-CM

## 2018-06-08 DIAGNOSIS — I25.118 CORONARY ARTERY DISEASE OF NATIVE ARTERY OF NATIVE HEART WITH STABLE ANGINA PECTORIS (HCC): ICD-10-CM

## 2018-06-08 DIAGNOSIS — E78.5 DYSLIPIDEMIA: ICD-10-CM

## 2018-06-08 DIAGNOSIS — I10 ESSENTIAL HYPERTENSION: ICD-10-CM

## 2018-06-08 DIAGNOSIS — I25.110 CORONARY ARTERY DISEASE INVOLVING NATIVE CORONARY ARTERY OF NATIVE HEART WITH UNSTABLE ANGINA PECTORIS (HCC): Primary | ICD-10-CM

## 2018-06-08 PROCEDURE — 99214 OFFICE O/P EST MOD 30 MIN: CPT | Performed by: INTERNAL MEDICINE

## 2018-06-08 RX ORDER — FINASTERIDE 5 MG/1
5 TABLET, FILM COATED ORAL DAILY
Qty: 30 TABLET | Refills: 5 | Status: CANCELLED | OUTPATIENT
Start: 2018-06-08

## 2018-06-14 ENCOUNTER — TELEPHONE (OUTPATIENT)
Dept: CARDIOLOGY CLINIC | Age: 58
End: 2018-06-14

## 2018-06-15 ENCOUNTER — TELEPHONE (OUTPATIENT)
Dept: CARDIOLOGY CLINIC | Age: 58
End: 2018-06-15

## 2018-07-02 ENCOUNTER — OFFICE VISIT (OUTPATIENT)
Dept: CARDIOLOGY CLINIC | Age: 58
End: 2018-07-02

## 2018-07-02 VITALS
HEART RATE: 60 BPM | SYSTOLIC BLOOD PRESSURE: 136 MMHG | DIASTOLIC BLOOD PRESSURE: 82 MMHG | WEIGHT: 249.4 LBS | HEIGHT: 72 IN | BODY MASS INDEX: 33.78 KG/M2

## 2018-07-02 DIAGNOSIS — I10 ESSENTIAL HYPERTENSION: ICD-10-CM

## 2018-07-02 DIAGNOSIS — E78.5 DYSLIPIDEMIA: ICD-10-CM

## 2018-07-02 DIAGNOSIS — I25.10 ASCVD (ARTERIOSCLEROTIC CARDIOVASCULAR DISEASE): ICD-10-CM

## 2018-07-02 DIAGNOSIS — Z98.61 S/P PTCA (PERCUTANEOUS TRANSLUMINAL CORONARY ANGIOPLASTY): Primary | ICD-10-CM

## 2018-07-02 DIAGNOSIS — R07.2 PRECORDIAL PAIN: ICD-10-CM

## 2018-07-02 PROCEDURE — 99214 OFFICE O/P EST MOD 30 MIN: CPT | Performed by: NURSE PRACTITIONER

## 2018-07-02 PROCEDURE — 93000 ELECTROCARDIOGRAM COMPLETE: CPT | Performed by: NURSE PRACTITIONER

## 2018-07-02 RX ORDER — ATORVASTATIN CALCIUM 40 MG/1
80 TABLET, FILM COATED ORAL DAILY
Qty: 30 TABLET | Refills: 11 | Status: SHIPPED | OUTPATIENT
Start: 2018-07-02 | End: 2018-07-19 | Stop reason: SDUPTHER

## 2018-07-02 RX ORDER — AMLODIPINE BESYLATE 10 MG/1
TABLET ORAL DAILY
Refills: 2 | COMMUNITY
Start: 2018-04-26 | End: 2020-02-10 | Stop reason: SDUPTHER

## 2018-07-02 NOTE — PROGRESS NOTES
echocardiogram 2017    normal,EF55-60%    HX OTHER MEDICAL     Foster care until age 14    Hyperlipidemia     Hypertension     Osteoarthritis     Primarily affecting the low back    Prostatic hypertrophy, benign     S/P PTCA (percutaneous transluminal coronary angioplasty) 2018    Stent to OM     Unspecified cerebral artery occlusion with cerebral infarction     pt states he had a \"mini stroke\"\"I did not know it was  a stroke, but after I had a concussion they told me the scan showed I had had a stroke\"     Past Surgical History:   Procedure Laterality Date    CARDIOVASCULAR STRESS TEST  2017    inferior ischemia    CORONARY ANGIOPLASTY WITH STENT PLACEMENT  2017    60% stenosis    OTHER SURGICAL HISTORY  2012    thyroid biopsy--benign    SHOULDER ARTHROSCOPY Right 1992    R shoulder scope partial rot cuff repair     Family History   Problem Relation Age of Onset    Coronary Art Dis Father     Hypertension Father     Stroke Father     High Blood Pressure Father     Heart Attack Father 80           Royetta Perches Breast Cancer Mother     Alzheimer's Disease Mother          80    Alcohol Abuse Brother     Cancer Brother         type unknown    Hypertension Brother     Learning Disabilities Brother     Heart Disease Brother     Heart Disease Brother      Social History   Substance Use Topics    Smoking status: Former Smoker     Packs/day: 0.50     Years: 30.00     Types: Cigarettes     Quit date:     Smokeless tobacco: Never Used    Alcohol use Yes      Comment: rarely        Review of Systems:   · Constitutional: No Fever or Weight Loss   · Eyes: No Decreased Vision  · ENT: No Headaches, Hearing Loss or Vertigo  · Cardiovascular: as per note above   · Respiratory: No cough or wheezing and as per note above.    · Gastrointestinal: No abdominal pain, appetite loss, blood in stools, constipation, diarrhea or heartburn  · Genitourinary: No dysuria, trouble voiding, or hematuria  · Musculoskeletal:  None  · Integumentary: No rash or pruritis  · Neurological: No TIA or stroke symptoms  · Psychiatric: No anxiety or depression  · Endocrine: No malaise, fatigue or temperature intolerance  · Hematologic/Lymphatic: No bleeding problems, blood clots or swollen lymph nodes  · Allergic/Immunologic: No nasal congestion or hives    Objective:       /82   Pulse 60   Ht 6' (1.829 m)   Wt 249 lb 6.4 oz (113.1 kg)   BMI 33.82 kg/m²   Wt Readings from Last 3 Encounters:   07/02/18 249 lb 6.4 oz (113.1 kg)   06/19/18 250 lb (113.4 kg)   06/08/18 250 lb (113.4 kg)       General Appearance:  No distress, conversant  Constitutional:  Well developed, Well nourished, No acute distress, Non-toxic appearance. HENT:  Normocephalic, Atraumatic, Bilateral external ears normal, Oropharynx moist, No oral exudates, Nose normal. Neck- Normal range of motion, No tenderness, Supple, No stridor,no apical-carotid delay  Eyes:  PERRL, EOMI, Conjunctiva normal, No discharge. Respiratory:  Normal breath sounds, No respiratory distress, No wheezing, No chest tenderness. ,no use of accessory muscles, NO crackles  Cardiovascular: (PMI) apex non displaced,no lifts no thrills, S1 &  S2 no added heart sound noted . GI:  Bowel sounds normal, Soft, No tenderness, No masses, No gross visceromegaly   :  No costovertebral angle tenderness   Musculoskeletal:  No edema, no tenderness, no deformities.  Back- no tenderness  Integument:  Well hydrated, no rash   Lymphatic:  No lymphadenopathy noted   Neurologic:  Alert & oriented x 3, CN 2-12 normal, normal motor function, normal sensory function, no focal deficits noted   Psychiatric:  Speech and behavior appropriate       Medical decision making and Data review:  DATA:  Lab Results   Component Value Date    TROPONINT <0.010 05/22/2018     BNP:  No results found for: PROBNP  PT/INR:  No results found for: PTINR  Lab Results   Component Value Date    LABA1C 5.6

## 2018-07-16 ENCOUNTER — TELEPHONE (OUTPATIENT)
Dept: CARDIOLOGY CLINIC | Age: 58
End: 2018-07-16

## 2018-07-16 NOTE — TELEPHONE ENCOUNTER
Patient called the office and is suppose to start Cardiac rehab 7/25 he is scheduled for the treadmill 7/23 to see if he is cleared to start rehab. Patient has a estimated return to work date on 07/31/18 and he had got a letter from [address] [place] [address] that he was to return to work today. Advised the patient the all the paperwork sent to [address] [place] [address] has estimated return to work date as 07/31/18 and the patient will call the office if he needs anything else for [facility].

## 2018-07-19 ENCOUNTER — OFFICE VISIT (OUTPATIENT)
Dept: FAMILY MEDICINE CLINIC | Age: 58
End: 2018-07-19

## 2018-07-19 VITALS
BODY MASS INDEX: 33.67 KG/M2 | WEIGHT: 248.6 LBS | RESPIRATION RATE: 16 BRPM | HEIGHT: 72 IN | SYSTOLIC BLOOD PRESSURE: 138 MMHG | DIASTOLIC BLOOD PRESSURE: 84 MMHG | HEART RATE: 76 BPM

## 2018-07-19 DIAGNOSIS — E66.9 OBESITY (BMI 30.0-34.9): ICD-10-CM

## 2018-07-19 DIAGNOSIS — E63.9 NUTRITIONAL PROBLEMS: ICD-10-CM

## 2018-07-19 DIAGNOSIS — I25.10 ASCVD (ARTERIOSCLEROTIC CARDIOVASCULAR DISEASE): ICD-10-CM

## 2018-07-19 DIAGNOSIS — E78.00 PURE HYPERCHOLESTEROLEMIA: Primary | ICD-10-CM

## 2018-07-19 DIAGNOSIS — I10 ESSENTIAL HYPERTENSION: ICD-10-CM

## 2018-07-19 PROCEDURE — 99213 OFFICE O/P EST LOW 20 MIN: CPT | Performed by: FAMILY MEDICINE

## 2018-07-19 RX ORDER — ATORVASTATIN CALCIUM 80 MG/1
80 TABLET, FILM COATED ORAL DAILY
Qty: 30 TABLET | Refills: 5 | Status: SHIPPED | OUTPATIENT
Start: 2018-07-19 | End: 2018-10-02

## 2018-07-19 ASSESSMENT — PATIENT HEALTH QUESTIONNAIRE - PHQ9
SUM OF ALL RESPONSES TO PHQ QUESTIONS 1-9: 2
SUM OF ALL RESPONSES TO PHQ9 QUESTIONS 1 & 2: 2
1. LITTLE INTEREST OR PLEASURE IN DOING THINGS: 1
2. FEELING DOWN, DEPRESSED OR HOPELESS: 1

## 2018-07-19 ASSESSMENT — ENCOUNTER SYMPTOMS
SHORTNESS OF BREATH: 0
CHEST TIGHTNESS: 0

## 2018-07-19 NOTE — PROGRESS NOTES
No components found for: CHLPL  Lab Results   Component Value Date    TRIG 106 06/01/2017     Lab Results   Component Value Date    HDL 28 (L) 06/01/2017     Lab Results   Component Value Date    LDLCALC 141 (H) 08/04/2016     Lab Results   Component Value Date    LABVLDL 20 08/04/2016       Lab Results   Component Value Date    ALT 36 05/21/2018    AST 26 05/21/2018    ALKPHOS 76 05/21/2018    BILITOT 0.3 05/21/2018           Is patient currently taking any cholesterol medications? Yes   If yes, see med list as above    Is the patient reporting any side effects of cholesterol medications? No    Is the patient taking any over the counter medications? No   If yes, see med list as above    Is the patient taking a daily aspirin?     Yes

## 2018-07-19 NOTE — PROGRESS NOTES
Patient ID: Diane Briggs 1960      Hypertension   This is a chronic problem. The current episode started more than 1 year ago. The problem is controlled. Pertinent negatives include no chest pain, palpitations or shortness of breath. Risk factors for coronary artery disease include male gender. Past treatments include alpha 1 blockers, beta blockers and ACE inhibitors. Compliance problems include psychosocial issues and diet (does not cook and eats out over 10 times per week). Hyperlipidemia   This is a chronic problem. The current episode started more than 1 year ago. Exacerbating diseases include obesity. Pertinent negatives include no chest pain or shortness of breath. Compliance problems include adherence to diet. Risk factors for coronary artery disease include male sex and obesity. Coronary Artery Disease   Presents for follow-up (Recently lost his job because of being off work because of his heart.) visit. Pertinent negatives include no chest pain, chest tightness, leg swelling, palpitations or shortness of breath. Risk factors include hyperlipidemia and obesity. Review of Systems   Respiratory: Negative for chest tightness and shortness of breath. Cardiovascular: Negative for chest pain, palpitations and leg swelling. Patient Active Problem List   Diagnosis    Benign prostatic hyperplasia    Essential hypertension    Pure hypercholesterolemia    Obesity (BMI 30.0-34. 9)    Coronary artery disease of native artery of native heart with stable angina pectoris ([address]    Dyslipidemia    Depression    Coronary artery disease involving native coronary artery of native heart with unstable angina pectoris (HCC)    Osteoarthritis of lumbar spine    Chronic pain    S/P PTCA (percutaneous transluminal coronary angioplasty)    ASCVD (arteriosclerotic cardiovascular disease)       Past Medical History:   Diagnosis Date    Blood transfusion 2010    CAD (coronary artery disease)     06/19/2018, Discharged on 06/19/2018   Component Date Value    WBC 06/19/2018 7.5     RBC 06/19/2018 4.81     Hemoglobin 06/19/2018 14.6     Hematocrit 06/19/2018 44.6     MCV 06/19/2018 92.7     MCH 06/19/2018 30.4     MCHC 06/19/2018 32.7     RDW 06/19/2018 13.9     Platelets 50/98/7004 275     MPV 06/19/2018 9.6     Protime 06/19/2018 13.8*    INR 06/19/2018 1.19     aPTT 06/19/2018 28.7     Sodium 06/19/2018 141     Potassium 06/19/2018 4.1     Chloride 06/19/2018 106     CO2 06/19/2018 25     Anion Gap 06/19/2018 10     BUN 06/19/2018 17     CREATININE 06/19/2018 1.6*    Glucose 06/19/2018 96     Calcium 06/19/2018 8.9     GFR Non- 06/19/2018 45*    GFR  06/19/2018 54*    Activated Clotting Time,* 06/19/2018 >400    Admission on 05/21/2018, Discharged on 05/23/2018   No results displayed because visit has over 200 results. Assessment:       Diagnosis Orders   1. Pure hypercholesterolemia     2. Obesity (BMI 30.0-34.9)     3. ASCVD (arteriosclerotic cardiovascular disease)     4. Nutritional problems             Plan:    Per cardiology, patient is already optimized on medications. Review of the Lipitor prescription and indicates that patient will run out of his Lipitor prematurely. Will notify cardiology to review) Lipitor prescription. Emphasized to patient that his nutritional status is poor. He cannot continue to eat out 10 times a week. This is the reason he has high blood pressure, obesity, high cholesterol, and heart disease. Information given for the Mediterranean diet    Reminded him of getting flu shot in the fall    Must follow cardiology recommendations before going back to work. He is unable to get a colonoscopy at this time due to recent stents.

## 2018-07-19 NOTE — PATIENT INSTRUCTIONS
Patient Education        Learning About the Mediterranean Diet  What is the [address]? The Mediterranean diet is a style of eating rather than a diet plan. It features foods eaten in [place], [place], [place] and [place], and other countries along the . It emphasizes eating foods like fish, fruits, vegetables, beans, high-fiber breads and whole grains, nuts, and olive oil. This style of eating includes limited red meat, cheese, and sweets. Why choose the Mediterranean diet? A Mediterranean-style diet may improve heart health. It contains more fat than other heart-healthy diets. But the fats are mainly from nuts, unsaturated oils (such as fish oils and olive oil), and certain nut or seed oils (such as canola, soybean, or flaxseed oil). These fats may help protect the heart and blood vessels. How can you get started on the Mediterranean diet? Here are some things you can do to switch to a more Mediterranean way of eating. What to eat  · Eat a variety of fruits and vegetables each day, such as grapes, blueberries, tomatoes, broccoli, peppers, figs, olives, spinach, eggplant, beans, lentils, and chickpeas. · Eat a variety of whole-grain foods each day, such as oats, brown rice, and whole wheat bread, pasta, and couscous. · Eat fish at least 2 times a week. Try tuna, salmon, mackerel, lake trout, herring, or sardines. · Eat moderate amounts of low-fat dairy products, such as milk, cheese, or yogurt. · Eat moderate amounts of poultry and eggs. · Choose healthy (unsaturated) fats, such as nuts, olive oil, and certain nut or seed oils like canola, soybean, and flaxseed. · Limit unhealthy (saturated) fats, such as butter, palm oil, and coconut oil. And limit fats found in animal products, such as meat and dairy products made with whole milk. Try to eat red meat only a few times a month in very small amounts. · Limit sweets and desserts to only a few times a week.  This includes

## 2018-07-23 ENCOUNTER — PROCEDURE VISIT (OUTPATIENT)
Dept: CARDIOLOGY CLINIC | Age: 58
End: 2018-07-23

## 2018-07-23 DIAGNOSIS — Z98.61 S/P PTCA (PERCUTANEOUS TRANSLUMINAL CORONARY ANGIOPLASTY): ICD-10-CM

## 2018-07-23 PROCEDURE — 93015 CV STRESS TEST SUPVJ I&R: CPT | Performed by: INTERNAL MEDICINE

## 2018-07-25 ENCOUNTER — HOSPITAL ENCOUNTER (OUTPATIENT)
Dept: OTHER | Age: 58
Discharge: HOME | End: 2018-07-31
Attending: INTERNAL MEDICINE | Admitting: INTERNAL MEDICINE

## 2018-07-30 ENCOUNTER — TELEPHONE (OUTPATIENT)
Dept: CARDIOLOGY CLINIC | Age: 58
End: 2018-07-30

## 2018-07-30 NOTE — TELEPHONE ENCOUNTER
Patient called back with same questions about work. If the estimated return to work date is the actual date for him to return or if it has been extended? Please advise. Date was estimated 7/31/18.

## 2018-08-01 ENCOUNTER — HOSPITAL ENCOUNTER (OUTPATIENT)
Dept: OTHER | Age: 58
Discharge: HOME | End: 2018-08-31
Attending: INTERNAL MEDICINE | Admitting: INTERNAL MEDICINE

## 2018-08-06 ENCOUNTER — OFFICE VISIT (OUTPATIENT)
Dept: FAMILY MEDICINE CLINIC | Age: 58
End: 2018-08-06

## 2018-08-06 VITALS
WEIGHT: 248 LBS | TEMPERATURE: 97.7 F | DIASTOLIC BLOOD PRESSURE: 70 MMHG | HEART RATE: 70 BPM | BODY MASS INDEX: 33.59 KG/M2 | RESPIRATION RATE: 16 BRPM | HEIGHT: 72 IN | SYSTOLIC BLOOD PRESSURE: 110 MMHG

## 2018-08-06 DIAGNOSIS — J06.9 VIRAL URI: Primary | ICD-10-CM

## 2018-08-06 PROBLEM — R79.89 CREATININE ELEVATION: Status: ACTIVE | Noted: 2018-08-06

## 2018-08-06 PROCEDURE — 99213 OFFICE O/P EST LOW 20 MIN: CPT | Performed by: FAMILY MEDICINE

## 2018-08-06 RX ORDER — ATORVASTATIN CALCIUM 40 MG/1
1 TABLET, FILM COATED ORAL DAILY
Refills: 11 | COMMUNITY
Start: 2018-08-02 | End: 2019-08-01 | Stop reason: SDUPTHER

## 2018-08-06 NOTE — LETTER
[address]  Phone: [phone]  Fax: [phone]    Kirke Mortimer, MD        August 6, 2018    [phone]      Dear Pastora Hou:    Chun Diaz are cleared to return to cardiac rehab. If you have any questions or concerns, please don't hesitate to call.     Sincerely,        Kirke Mortimer, MD

## 2018-08-16 ENCOUNTER — TELEPHONE (OUTPATIENT)
Dept: CARDIOLOGY CLINIC | Age: 58
End: 2018-08-16

## 2018-08-16 NOTE — LETTER
[facility]  [place], [address] [facility] [address]  Phone: [phone]  Fax: [phone]     Hayden Brown MD           August 16, 2018      Patient: Erinn Whitney   YOB: 1960   Date of Visit: 8/16/2018         To Whom It May Concern:     It is my medical opinion that Mariela Ge may return to full duty immediately with no restrictions on 8/21/18.     If you have any questions or concerns, please don't hesitate to call.     Sincerely,  Rajinder Aguero MD

## 2018-09-01 ENCOUNTER — HOSPITAL ENCOUNTER (OUTPATIENT)
Dept: OTHER | Age: 58
Discharge: HOME | End: 2018-09-01
Attending: INTERNAL MEDICINE | Admitting: INTERNAL MEDICINE

## 2018-09-28 ENCOUNTER — HOSPITAL ENCOUNTER (EMERGENCY)
Age: 58
Discharge: LEFT BEFORE DISCHARGE | End: 2018-09-29
Attending: EMERGENCY MEDICINE
Payer: COMMERCIAL

## 2018-09-28 ENCOUNTER — APPOINTMENT (OUTPATIENT)
Dept: GENERAL RADIOLOGY | Age: 58
End: 2018-09-28
Payer: COMMERCIAL

## 2018-09-28 ENCOUNTER — TELEPHONE (OUTPATIENT)
Dept: CARDIOLOGY CLINIC | Age: 58
End: 2018-09-28

## 2018-09-28 DIAGNOSIS — R07.9 CHEST PAIN, UNSPECIFIED TYPE: Primary | ICD-10-CM

## 2018-09-28 LAB
ALBUMIN SERPL-MCNC: 4.1 GM/DL (ref 3.4–5)
ALP BLD-CCNC: 90 IU/L (ref 40–129)
ALT SERPL-CCNC: 27 U/L (ref 10–40)
ANION GAP SERPL CALCULATED.3IONS-SCNC: 11 MMOL/L (ref 4–16)
AST SERPL-CCNC: 25 IU/L (ref 15–37)
BASOPHILS ABSOLUTE: 0 K/CU MM
BASOPHILS RELATIVE PERCENT: 0.6 % (ref 0–1)
BILIRUB SERPL-MCNC: 0.4 MG/DL (ref 0–1)
BUN BLDV-MCNC: 14 MG/DL (ref 6–23)
CALCIUM SERPL-MCNC: 9 MG/DL (ref 8.3–10.6)
CHLORIDE BLD-SCNC: 103 MMOL/L (ref 99–110)
CO2: 24 MMOL/L (ref 21–32)
CREAT SERPL-MCNC: 1.5 MG/DL (ref 0.9–1.3)
DIFFERENTIAL TYPE: ABNORMAL
EOSINOPHILS ABSOLUTE: 0.3 K/CU MM
EOSINOPHILS RELATIVE PERCENT: 4.8 % (ref 0–3)
GFR AFRICAN AMERICAN: 58 ML/MIN/1.73M2
GFR NON-AFRICAN AMERICAN: 48 ML/MIN/1.73M2
GLUCOSE BLD-MCNC: 93 MG/DL (ref 70–99)
HCT VFR BLD CALC: 44.2 % (ref 42–52)
HEMOGLOBIN: 14.3 GM/DL (ref 13.5–18)
IMMATURE NEUTROPHIL %: 0.4 % (ref 0–0.43)
LYMPHOCYTES ABSOLUTE: 1.6 K/CU MM
LYMPHOCYTES RELATIVE PERCENT: 22.2 % (ref 24–44)
MCH RBC QN AUTO: 28.8 PG (ref 27–31)
MCHC RBC AUTO-ENTMCNC: 32.4 % (ref 32–36)
MCV RBC AUTO: 88.9 FL (ref 78–100)
MONOCYTES ABSOLUTE: 0.8 K/CU MM
MONOCYTES RELATIVE PERCENT: 11.2 % (ref 0–4)
NUCLEATED RBC %: 0 %
PDW BLD-RTO: 14.6 % (ref 11.7–14.9)
PLATELET # BLD: 305 K/CU MM (ref 140–440)
PMV BLD AUTO: 9.7 FL (ref 7.5–11.1)
POTASSIUM SERPL-SCNC: 4.1 MMOL/L (ref 3.5–5.1)
RBC # BLD: 4.97 M/CU MM (ref 4.6–6.2)
SEGMENTED NEUTROPHILS ABSOLUTE COUNT: 4.3 K/CU MM
SEGMENTED NEUTROPHILS RELATIVE PERCENT: 60.8 % (ref 36–66)
SODIUM BLD-SCNC: 138 MMOL/L (ref 135–145)
TOTAL IMMATURE NEUTOROPHIL: 0.03 K/CU MM
TOTAL NUCLEATED RBC: 0 K/CU MM
TOTAL PROTEIN: 7.6 GM/DL (ref 6.4–8.2)
TROPONIN T: <0.01 NG/ML
WBC # BLD: 7.1 K/CU MM (ref 4–10.5)

## 2018-09-28 PROCEDURE — 99285 EMERGENCY DEPT VISIT HI MDM: CPT

## 2018-09-28 PROCEDURE — 84484 ASSAY OF TROPONIN QUANT: CPT

## 2018-09-28 PROCEDURE — 71046 X-RAY EXAM CHEST 2 VIEWS: CPT

## 2018-09-28 PROCEDURE — 93005 ELECTROCARDIOGRAM TRACING: CPT | Performed by: EMERGENCY MEDICINE

## 2018-09-28 PROCEDURE — 36415 COLL VENOUS BLD VENIPUNCTURE: CPT

## 2018-09-28 PROCEDURE — 85025 COMPLETE CBC W/AUTO DIFF WBC: CPT

## 2018-09-28 PROCEDURE — 80053 COMPREHEN METABOLIC PANEL: CPT

## 2018-09-28 ASSESSMENT — PAIN DESCRIPTION - LOCATION: LOCATION: CHEST

## 2018-09-28 ASSESSMENT — PAIN DESCRIPTION - PAIN TYPE: TYPE: ACUTE PAIN

## 2018-09-28 ASSESSMENT — PAIN SCALES - GENERAL: PAINLEVEL_OUTOF10: 4

## 2018-09-28 NOTE — TELEPHONE ENCOUNTER
Patient state the pain is like what he had before he had his stents put in and is more intense. Advised to go to ED for Evaluation and treatment. Patient agreed.

## 2018-09-29 VITALS
HEART RATE: 49 BPM | WEIGHT: 244 LBS | SYSTOLIC BLOOD PRESSURE: 167 MMHG | HEIGHT: 72 IN | TEMPERATURE: 98.7 F | RESPIRATION RATE: 15 BRPM | DIASTOLIC BLOOD PRESSURE: 108 MMHG | OXYGEN SATURATION: 95 % | BODY MASS INDEX: 33.05 KG/M2

## 2018-09-29 LAB — TROPONIN T: <0.01 NG/ML

## 2018-09-29 PROCEDURE — 6370000000 HC RX 637 (ALT 250 FOR IP): Performed by: EMERGENCY MEDICINE

## 2018-09-29 PROCEDURE — 93010 ELECTROCARDIOGRAM REPORT: CPT | Performed by: INTERNAL MEDICINE

## 2018-09-29 PROCEDURE — 6370000000 HC RX 637 (ALT 250 FOR IP): Performed by: PHYSICIAN ASSISTANT

## 2018-09-29 PROCEDURE — 36415 COLL VENOUS BLD VENIPUNCTURE: CPT

## 2018-09-29 PROCEDURE — 84484 ASSAY OF TROPONIN QUANT: CPT

## 2018-09-29 RX ORDER — ACETAMINOPHEN 500 MG
1000 TABLET ORAL ONCE
Status: COMPLETED | OUTPATIENT
Start: 2018-09-29 | End: 2018-09-29

## 2018-09-29 RX ORDER — ASPIRIN 325 MG
325 TABLET ORAL ONCE
Status: COMPLETED | OUTPATIENT
Start: 2018-09-29 | End: 2018-09-29

## 2018-09-29 RX ADMIN — NITROGLYCERIN 1 INCH: 20 OINTMENT TOPICAL at 02:24

## 2018-09-29 RX ADMIN — ASPIRIN 325 MG ORAL TABLET 325 MG: 325 PILL ORAL at 02:53

## 2018-09-29 RX ADMIN — ACETAMINOPHEN 1000 MG: 500 TABLET, FILM COATED ORAL at 02:53

## 2018-09-29 ASSESSMENT — PAIN SCALES - GENERAL: PAINLEVEL_OUTOF10: 4

## 2018-09-29 NOTE — ED PROVIDER NOTES
 Hyperlipidemia     Hypertension     Osteoarthritis     Primarily affecting the low back    Prostatic hypertrophy, benign     S/P PTCA (percutaneous transluminal coronary angioplasty) 2018: Stent to LAD.  18: Stent to OM     Unspecified cerebral artery occlusion with cerebral infarction     pt states he had a \"mini stroke\"\"I did not know it was  a stroke, but after I had a concussion they told me the scan showed I had had a stroke\"     Past Surgical History:   Procedure Laterality Date    CARDIOVASCULAR STRESS TEST  2017    inferior ischemia    CORONARY ANGIOPLASTY WITH STENT PLACEMENT  2017    60% stenosis    OTHER SURGICAL HISTORY  2012    thyroid biopsy--benign    SHOULDER ARTHROSCOPY Right 1992    R shoulder scope partial rot cuff repair     Family History   Problem Relation Age of Onset    Coronary Art Dis Father     Hypertension Father     Stroke Father     High Blood Pressure Father     Heart Attack Father 80           [facility] Breast Cancer Mother     Alzheimer's Disease Mother          80    Alcohol Abuse Brother     Cancer Brother         type unknown    Hypertension Brother     Learning Disabilities Brother     Heart Disease Brother     Heart Disease Brother      Social History     Social History    Marital status:      Spouse name: N/A    Number of children: 3    Years of education: N/A     Occupational History    Not on file. Social History Main Topics    Smoking status: Former Smoker     Packs/day: 0.50     Years: 30.00     Types: Cigarettes     Quit date:     Smokeless tobacco: Never Used    Alcohol use Yes      Comment: rarely    Drug use: No    Sexual activity: Not Currently     Other Topics Concern    Not on file     Social History Narrative    No narrative on file     No current facility-administered medications for this encounter.       Current Outpatient Prescriptions   Medication Sig Dispense Refill   

## 2018-09-29 NOTE — ED NOTES
Dr. Dago Navarro and Cullen ROY at bedside and explained to patient risks of leaving AMA. AMA paperwork signed.       Nazario George RN  09/29/18 [id number]

## 2018-10-01 ENCOUNTER — TELEPHONE (OUTPATIENT)
Dept: CARDIOLOGY CLINIC | Age: 58
End: 2018-10-01

## 2018-10-01 LAB
EKG ATRIAL RATE: 57 BPM
EKG DIAGNOSIS: NORMAL
EKG P AXIS: 48 DEGREES
EKG P-R INTERVAL: 138 MS
EKG Q-T INTERVAL: 418 MS
EKG QRS DURATION: 88 MS
EKG QTC CALCULATION (BAZETT): 406 MS
EKG R AXIS: 9 DEGREES
EKG T AXIS: 52 DEGREES
EKG VENTRICULAR RATE: 57 BPM

## 2018-10-02 ENCOUNTER — OFFICE VISIT (OUTPATIENT)
Dept: CARDIOLOGY CLINIC | Age: 58
End: 2018-10-02
Payer: COMMERCIAL

## 2018-10-02 VITALS
WEIGHT: 250.8 LBS | SYSTOLIC BLOOD PRESSURE: 160 MMHG | HEIGHT: 72 IN | DIASTOLIC BLOOD PRESSURE: 100 MMHG | BODY MASS INDEX: 33.97 KG/M2 | HEART RATE: 52 BPM

## 2018-10-02 DIAGNOSIS — I10 ESSENTIAL HYPERTENSION: ICD-10-CM

## 2018-10-02 DIAGNOSIS — Z98.61 S/P PTCA (PERCUTANEOUS TRANSLUMINAL CORONARY ANGIOPLASTY): ICD-10-CM

## 2018-10-02 DIAGNOSIS — I25.110 CORONARY ARTERY DISEASE INVOLVING NATIVE CORONARY ARTERY OF NATIVE HEART WITH UNSTABLE ANGINA PECTORIS (HCC): ICD-10-CM

## 2018-10-02 DIAGNOSIS — E78.00 PURE HYPERCHOLESTEROLEMIA: ICD-10-CM

## 2018-10-02 DIAGNOSIS — I25.118 CORONARY ARTERY DISEASE OF NATIVE ARTERY OF NATIVE HEART WITH STABLE ANGINA PECTORIS (HCC): ICD-10-CM

## 2018-10-02 DIAGNOSIS — E78.5 DYSLIPIDEMIA: ICD-10-CM

## 2018-10-02 DIAGNOSIS — I25.10 ASCVD (ARTERIOSCLEROTIC CARDIOVASCULAR DISEASE): Primary | ICD-10-CM

## 2018-10-02 PROCEDURE — 99214 OFFICE O/P EST MOD 30 MIN: CPT | Performed by: INTERNAL MEDICINE

## 2018-10-02 RX ORDER — LISINOPRIL 20 MG/1
20 TABLET ORAL DAILY
Qty: 30 TABLET | Refills: 2 | Status: SHIPPED | OUTPATIENT
Start: 2018-10-02 | End: 2020-02-10 | Stop reason: SDUPTHER

## 2018-10-02 NOTE — PROGRESS NOTES
Years: 30.00     Types: Cigarettes     Quit date: 2017    Smokeless tobacco: Never Used    Alcohol use Yes      Comment: rarely        Review of Systems:   · Constitutional: No Fever or Weight Loss   · Eyes: No Decreased Vision  · ENT: No Headaches, Hearing Loss or Vertigo  · Cardiovascular: as per note above   · Respiratory: No cough or wheezing and as per note above. · Gastrointestinal: No abdominal pain, appetite loss, blood in stools, constipation, diarrhea or heartburn  · Genitourinary: No dysuria, trouble voiding, or hematuria  · Musculoskeletal:  None  · Integumentary: No rash or pruritis  · Neurological: No TIA or stroke symptoms  · Psychiatric: No anxiety or depression  · Endocrine: No malaise, fatigue or temperature intolerance  · Hematologic/Lymphatic: No bleeding problems, blood clots or swollen lymph nodes  · Allergic/Immunologic: No nasal congestion or hives    Objective:      Physical Exam:  BP (!) 160/100   Pulse 52   Ht 6' (1.829 m)   Wt 250 lb 12.8 oz (113.8 kg)   BMI 34.01 kg/m²   Wt Readings from Last 3 Encounters:   10/02/18 250 lb 12.8 oz (113.8 kg)   09/28/18 244 lb (110.7 kg)   08/06/18 248 lb (112.5 kg)     Body mass index is 34.01 kg/m². Vitals:    10/02/18 1146   BP: (!) 160/100   Pulse:         General Appearance:  No distress, conversant  Constitutional:  Well developed, Well nourished, No acute distress, Non-toxic appearance. HENT:  Normocephalic, Atraumatic, Bilateral external ears normal, Oropharynx moist, No oral exudates, Nose normal. Neck- Normal range of motion, No tenderness, Supple, No stridor,no apical-carotid delay  Eyes:  PERRL, EOMI, Conjunctiva normal, No discharge. Respiratory:  Normal breath sounds, No respiratory distress, No wheezing, No chest tenderness. ,no use of accessory muscles,   Cardiovascular: (PMI) apex non displaced,no lifts no thrills,S1 and S2 audible, No added heart sounds, No signs of ankle edema, or volume overload, No evidence of JVD  GI: 2018.  Continue DAPT for as long as possible. due to his symptoms of dizziness and feeling unwell    He was dizzy on ranexa? Will get stress test to rule out ischemia. Advised to avoid working long hours more than 10 hr shifts and avoid lifting heavy weight     Essential hypertension  Increase lisinopril to 20 mg daily  He is already feeling dizzy when he stands up. Dyslipidemia :  Za Brooke had lab work recently,  Lipid profile was reviewed with patient. continue lipitor 40 mg     Counseled extensively and medication compliance urged. We discussed that for the  prevention of ASCVD our  goal is aggressive risk modification. Patient is encouraged to exercise even a brisk walk for 30 minutes  at least 3 to 4 times a week   Various goals were discussed and questions answered. Continue current medications. Appropriate prescriptions are addressed and refills ordered. Questions answered and patient verbalizes understanding. Call for any problems, questions, or concerns. Continue all other medications of all above medical condition listed as is. Return in about 6 months (around 4/2/2019).

## 2018-10-04 ENCOUNTER — TELEPHONE (OUTPATIENT)
Dept: CARDIOLOGY CLINIC | Age: 58
End: 2018-10-04

## 2018-10-11 ENCOUNTER — PROCEDURE VISIT (OUTPATIENT)
Dept: CARDIOLOGY CLINIC | Age: 58
End: 2018-10-11
Payer: COMMERCIAL

## 2018-10-11 DIAGNOSIS — I25.10 ASCVD (ARTERIOSCLEROTIC CARDIOVASCULAR DISEASE): ICD-10-CM

## 2018-10-11 DIAGNOSIS — R07.9 CHEST PAIN, UNSPECIFIED TYPE: ICD-10-CM

## 2018-10-11 DIAGNOSIS — I10 ESSENTIAL HYPERTENSION: ICD-10-CM

## 2018-10-11 DIAGNOSIS — Z98.61 S/P PTCA (PERCUTANEOUS TRANSLUMINAL CORONARY ANGIOPLASTY): ICD-10-CM

## 2018-10-11 DIAGNOSIS — E78.5 DYSLIPIDEMIA: ICD-10-CM

## 2018-10-11 DIAGNOSIS — I25.110 CORONARY ARTERY DISEASE INVOLVING NATIVE CORONARY ARTERY OF NATIVE HEART WITH UNSTABLE ANGINA PECTORIS (HCC): ICD-10-CM

## 2018-10-11 DIAGNOSIS — E78.00 PURE HYPERCHOLESTEROLEMIA: ICD-10-CM

## 2018-10-11 DIAGNOSIS — R06.02 SHORTNESS OF BREATH: Primary | ICD-10-CM

## 2018-10-11 DIAGNOSIS — I25.118 CORONARY ARTERY DISEASE OF NATIVE ARTERY OF NATIVE HEART WITH STABLE ANGINA PECTORIS (HCC): ICD-10-CM

## 2018-10-11 LAB
LV EF: 48 %
LVEF MODALITY: NORMAL

## 2018-10-11 PROCEDURE — 93018 CV STRESS TEST I&R ONLY: CPT | Performed by: INTERNAL MEDICINE

## 2018-10-11 PROCEDURE — 78452 HT MUSCLE IMAGE SPECT MULT: CPT | Performed by: INTERNAL MEDICINE

## 2018-10-11 PROCEDURE — 93017 CV STRESS TEST TRACING ONLY: CPT | Performed by: INTERNAL MEDICINE

## 2018-10-11 PROCEDURE — A9500 TC99M SESTAMIBI: HCPCS | Performed by: INTERNAL MEDICINE

## 2018-10-11 PROCEDURE — 93016 CV STRESS TEST SUPVJ ONLY: CPT | Performed by: INTERNAL MEDICINE

## 2018-10-12 ENCOUNTER — TELEPHONE (OUTPATIENT)
Dept: CARDIOLOGY CLINIC | Age: 58
End: 2018-10-12

## 2018-11-05 ENCOUNTER — TELEPHONE (OUTPATIENT)
Dept: CARDIOLOGY CLINIC | Age: 58
End: 2018-11-05

## 2018-11-07 ENCOUNTER — OFFICE VISIT (OUTPATIENT)
Dept: CARDIOLOGY CLINIC | Age: 58
End: 2018-11-07
Payer: COMMERCIAL

## 2018-11-07 VITALS
HEART RATE: 80 BPM | BODY MASS INDEX: 33.72 KG/M2 | SYSTOLIC BLOOD PRESSURE: 138 MMHG | HEIGHT: 72 IN | DIASTOLIC BLOOD PRESSURE: 88 MMHG | WEIGHT: 249 LBS

## 2018-11-07 DIAGNOSIS — I25.10 ASCVD (ARTERIOSCLEROTIC CARDIOVASCULAR DISEASE): ICD-10-CM

## 2018-11-07 DIAGNOSIS — Z98.61 S/P PTCA (PERCUTANEOUS TRANSLUMINAL CORONARY ANGIOPLASTY): ICD-10-CM

## 2018-11-07 DIAGNOSIS — I10 ESSENTIAL HYPERTENSION: ICD-10-CM

## 2018-11-07 DIAGNOSIS — I25.118 CORONARY ARTERY DISEASE OF NATIVE ARTERY OF NATIVE HEART WITH STABLE ANGINA PECTORIS (HCC): ICD-10-CM

## 2018-11-07 DIAGNOSIS — I25.110 CORONARY ARTERY DISEASE INVOLVING NATIVE CORONARY ARTERY OF NATIVE HEART WITH UNSTABLE ANGINA PECTORIS (HCC): Primary | ICD-10-CM

## 2018-11-07 PROCEDURE — 99214 OFFICE O/P EST MOD 30 MIN: CPT | Performed by: INTERNAL MEDICINE

## 2018-11-07 RX ORDER — FINASTERIDE 5 MG/1
5 TABLET, FILM COATED ORAL DAILY
Qty: 30 TABLET | Refills: 3 | Status: SHIPPED | OUTPATIENT
Start: 2018-11-07 | End: 2020-02-10

## 2018-11-29 ENCOUNTER — TELEPHONE (OUTPATIENT)
Dept: CARDIOLOGY CLINIC | Age: 58
End: 2018-11-29

## 2018-12-04 DIAGNOSIS — M54.2 NECK PAIN: Primary | ICD-10-CM

## 2018-12-05 ENCOUNTER — TELEPHONE (OUTPATIENT)
Dept: CARDIOLOGY CLINIC | Age: 58
End: 2018-12-05

## 2018-12-31 ENCOUNTER — OFFICE VISIT (OUTPATIENT)
Dept: FAMILY MEDICINE CLINIC | Age: 58
End: 2018-12-31
Payer: COMMERCIAL

## 2018-12-31 VITALS
BODY MASS INDEX: 32.53 KG/M2 | TEMPERATURE: 97.4 F | SYSTOLIC BLOOD PRESSURE: 120 MMHG | WEIGHT: 240.2 LBS | HEIGHT: 72 IN | DIASTOLIC BLOOD PRESSURE: 80 MMHG | HEART RATE: 64 BPM

## 2018-12-31 DIAGNOSIS — R11.10 POST-TUSSIVE EMESIS: ICD-10-CM

## 2018-12-31 DIAGNOSIS — R10.31 RIGHT LOWER QUADRANT ABDOMINAL PAIN: Primary | ICD-10-CM

## 2018-12-31 LAB
BILIRUBIN, POC: NEGATIVE
BLOOD URINE, POC: NEGATIVE
CLARITY, POC: CLEAR
COLOR, POC: YELLOW
GLUCOSE URINE, POC: NEGATIVE
KETONES, POC: NEGATIVE
LEUKOCYTE EST, POC: NEGATIVE
NITRITE, POC: NEGATIVE
PH, POC: 5.5
PROTEIN, POC: NEGATIVE
SPECIFIC GRAVITY, POC: 1.02
UROBILINOGEN, POC: 0.2

## 2018-12-31 PROCEDURE — 81002 URINALYSIS NONAUTO W/O SCOPE: CPT | Performed by: FAMILY MEDICINE

## 2018-12-31 PROCEDURE — 99213 OFFICE O/P EST LOW 20 MIN: CPT | Performed by: FAMILY MEDICINE

## 2018-12-31 NOTE — PROGRESS NOTES
Patient ID: Herve Alaniz 1960    . Chief Complaint   Patient presents with    Abdominal Pain     entire right quadrant- hurts when he lays down, sharp-stabbing, no nausea, vomiting, urinary issues for 1 week but less than 2 weeks    Nausea     when coughs feels like going to vomit         Abdominal Pain   This is a new problem. The current episode started 1 to 4 weeks ago. The problem occurs daily. The problem has been unchanged. The pain is located in the RLQ. The pain is at a severity of 8/10. The quality of the pain is colicky and cramping. Associated symptoms include belching and flatus. Pertinent negatives include no constipation, diarrhea, hematochezia or hematuria. Associated symptoms comments: Admits to consuming much more broccoli lately. He has tried nothing for the symptoms. Coughing: Sometimes coughs so violently that he vomits. No problems with coughing currently. Review of Systems   Cardiovascular: Negative. Gastrointestinal: Positive for abdominal pain and flatus. Negative for blood in stool, constipation, diarrhea and hematochezia. Genitourinary: Negative for hematuria. Patient Active Problem List   Diagnosis    Benign prostatic hyperplasia    Essential hypertension    Pure hypercholesterolemia    Obesity (BMI 30.0-34. 9)    Coronary artery disease of native artery of native heart with stable angina pectoris ([address]    Dyslipidemia    Depression    Coronary artery disease involving native coronary artery of native heart with unstable angina pectoris (HCC)    Osteoarthritis of lumbar spine    Chronic pain    S/P PTCA (percutaneous transluminal coronary angioplasty)    ASCVD (arteriosclerotic cardiovascular disease)    Creatinine elevation       Past Medical History:   Diagnosis Date    Blood transfusion 2010    CAD (coronary artery disease)     Chest pain     GERD (gastroesophageal reflux disease)     H/O exercise stress test 05/31/2018    treadmill    H/O (PRINIVIL;ZESTRIL) 20 MG tablet Take 1 tablet by mouth daily 30 tablet 2    atorvastatin (LIPITOR) 40 MG tablet Take 1 tablet by mouth daily  11    amLODIPine (NORVASC) 10 MG tablet daily   2    metoprolol tartrate (LOPRESSOR) 50 MG tablet Take 1 tablet by mouth 2 times daily Hold if SBP < 100 and call cardiology 60 tablet 5    prasugrel (EFFIENT) 10 MG TABS Take 1 tablet by mouth daily 30 tablet 5    finasteride (PROSCAR) 5 MG tablet Take 1 tablet by mouth daily 30 tablet 5    Multiple Vitamins-Minerals (MENS MULTIPLE VITAMIN/LYCOPENE) TABS Take  by mouth.  aspirin 81 MG tablet Take 81 mg by mouth daily.  finasteride (PROSCAR) 5 MG tablet Take 1 tablet by mouth daily 30 tablet 3    nitroGLYCERIN (NITROSTAT) 0.4 MG SL tablet up to max of 3 total doses. If no relief after 1 dose, call 911. 25 tablet 3     No current facility-administered medications on file prior to visit. Objective:   Physical Exam   Constitutional: He appears well-developed and well-nourished. No distress. HENT:   Head: Normocephalic and atraumatic. Right Ear: Hearing, tympanic membrane and external ear normal.   Left Ear: Hearing, tympanic membrane and external ear normal.   Mouth/Throat: Oropharynx is clear and moist and mucous membranes are normal. No oropharyngeal exudate, posterior oropharyngeal edema, posterior oropharyngeal erythema or tonsillar abscesses. Neck: No tracheal deviation present. No thyromegaly present. Cardiovascular: Normal rate, regular rhythm, S1 normal, S2 normal and normal heart sounds. Exam reveals no gallop. No murmur heard. Pulmonary/Chest: Effort normal and breath sounds normal. No respiratory distress. He has no wheezes. He has no rales. Abdominal: Soft. Bowel sounds are normal. He exhibits no mass. There is no hepatosplenomegaly. There is no tenderness. There is no rigidity, no rebound, no guarding and no CVA tenderness. Musculoskeletal: He exhibits no edema. Right lower leg: He exhibits no edema. Left lower leg: He exhibits no edema. Lymphadenopathy:        Head (right side): No submental, no submandibular and no posterior auricular adenopathy present. Head (left side): No submental, no submandibular and no posterior auricular adenopathy present. Right cervical: No superficial cervical, no deep cervical and no posterior cervical adenopathy present. Left cervical: No superficial cervical, no deep cervical and no posterior cervical adenopathy present. Skin: Skin is warm, dry and intact. Psychiatric: He has a normal mood and affect. His speech is normal and behavior is normal. Cognition and memory are normal.   Nursing note and vitals reviewed. Vitals:    12/31/18 1540   BP: 120/80   Site: Right Upper Arm   Position: Sitting   Cuff Size: Large Adult   Pulse: 64   Temp: 97.4 °F (36.3 °C)   TempSrc: Oral   Weight: 240 lb 3.2 oz (109 kg)   Height: 6' (1.829 m)     Body mass index is 32.58 kg/m². Wt Readings from Last 3 Encounters:   12/31/18 240 lb 3.2 oz (109 kg)   11/07/18 249 lb (112.9 kg)   10/02/18 250 lb 12.8 oz (113.8 kg)     BP Readings from Last 3 Encounters:   12/31/18 120/80   11/07/18 138/88   10/02/18 (!) 160/100          No results found for this visit on 12/31/18. The 10-year ASCVD risk score (Faraz Burroughs, et al., 2013) is: 12.8%    Values used to calculate the score:      Age: 60 years      Sex: Male      Is Non- : Yes      Diabetic: No      Tobacco smoker: No      Systolic Blood Pressure: 455 mmHg      Is BP treated: Yes      HDL Cholesterol: 28 MG/DL      Total Cholesterol: 161 MG/DL  Lab Review   No visits with results within 2 Month(s) from this visit.    Latest known visit with results is:   Admission on 09/28/2018, Discharged on 09/29/2018   Component Date Value    Ventricular Rate 09/28/2018 57     Atrial Rate 09/28/2018 57     P-R Interval 09/28/2018 138     QRS Duration 09/28/2018 88 Value Ref Range    Color, UA yellow     Clarity, UA clear     Glucose, UA POC negative     Bilirubin, UA negative     Ketones, UA negative     Spec Grav, UA 1.020     Blood, UA POC negative     pH, UA 5.5     Protein, UA POC negative     Urobilinogen, UA 0.2     Leukocytes, UA negative     Nitrite, UA negative        Assessment:       Diagnosis Orders   1. Right lower quadrant abdominal pain  POCT Urinalysis no Micro   2. Post-tussive emesis             Plan:      Could be abdominal pain related to new high fiber diet. Patient admitting that the pain started shortly after changing his diet. Recommend cutting back on the broccoli.   RE-check in 1 week    Post tussive emesis not a concern since no longer coughing

## 2019-02-26 ENCOUNTER — TELEPHONE (OUTPATIENT)
Dept: CARDIOLOGY CLINIC | Age: 59
End: 2019-02-26

## 2019-08-01 ENCOUNTER — OFFICE VISIT (OUTPATIENT)
Dept: CARDIOLOGY CLINIC | Age: 59
End: 2019-08-01
Payer: COMMERCIAL

## 2019-08-01 ENCOUNTER — OFFICE VISIT (OUTPATIENT)
Dept: FAMILY MEDICINE CLINIC | Age: 59
End: 2019-08-01
Payer: COMMERCIAL

## 2019-08-01 VITALS
HEIGHT: 72 IN | SYSTOLIC BLOOD PRESSURE: 128 MMHG | BODY MASS INDEX: 32.64 KG/M2 | DIASTOLIC BLOOD PRESSURE: 80 MMHG | TEMPERATURE: 97.7 F | HEART RATE: 65 BPM | WEIGHT: 241 LBS

## 2019-08-01 VITALS
BODY MASS INDEX: 33 KG/M2 | DIASTOLIC BLOOD PRESSURE: 86 MMHG | HEIGHT: 72 IN | SYSTOLIC BLOOD PRESSURE: 132 MMHG | WEIGHT: 243.6 LBS | HEART RATE: 60 BPM

## 2019-08-01 DIAGNOSIS — Z01.818 PRE-OPERATIVE EXAMINATION: ICD-10-CM

## 2019-08-01 DIAGNOSIS — R30.0 DYSURIA: ICD-10-CM

## 2019-08-01 DIAGNOSIS — E78.00 PURE HYPERCHOLESTEROLEMIA: Primary | ICD-10-CM

## 2019-08-01 DIAGNOSIS — I25.110 CORONARY ARTERY DISEASE INVOLVING NATIVE CORONARY ARTERY OF NATIVE HEART WITH UNSTABLE ANGINA PECTORIS (HCC): ICD-10-CM

## 2019-08-01 DIAGNOSIS — I10 ESSENTIAL HYPERTENSION: Primary | ICD-10-CM

## 2019-08-01 DIAGNOSIS — Z12.11 SCREEN FOR COLON CANCER: ICD-10-CM

## 2019-08-01 DIAGNOSIS — E78.00 PURE HYPERCHOLESTEROLEMIA: ICD-10-CM

## 2019-08-01 DIAGNOSIS — M79.661 PAIN IN BOTH LOWER LEGS: ICD-10-CM

## 2019-08-01 DIAGNOSIS — E66.9 OBESITY (BMI 30.0-34.9): ICD-10-CM

## 2019-08-01 DIAGNOSIS — M79.662 PAIN IN BOTH LOWER LEGS: ICD-10-CM

## 2019-08-01 DIAGNOSIS — I10 ESSENTIAL HYPERTENSION: ICD-10-CM

## 2019-08-01 DIAGNOSIS — Z12.5 PROSTATE CANCER SCREENING: ICD-10-CM

## 2019-08-01 LAB
A/G RATIO: 1.2 (ref 1.1–2.2)
ALBUMIN SERPL-MCNC: 4.1 G/DL (ref 3.4–5)
ALP BLD-CCNC: 90 U/L (ref 40–129)
ALT SERPL-CCNC: 28 U/L (ref 10–40)
ANION GAP SERPL CALCULATED.3IONS-SCNC: 13 MMOL/L (ref 3–16)
AST SERPL-CCNC: 22 U/L (ref 15–37)
BILIRUB SERPL-MCNC: <0.2 MG/DL (ref 0–1)
BILIRUBIN, POC: NEGATIVE
BLOOD URINE, POC: NEGATIVE
BUN BLDV-MCNC: 17 MG/DL (ref 7–20)
CALCIUM SERPL-MCNC: 9.3 MG/DL (ref 8.3–10.6)
CHLORIDE BLD-SCNC: 105 MMOL/L (ref 99–110)
CHOLESTEROL, TOTAL: 157 MG/DL (ref 0–199)
CLARITY, POC: CLEAR
CO2: 24 MMOL/L (ref 21–32)
COLOR, POC: YELLOW
CREAT SERPL-MCNC: 1.4 MG/DL (ref 0.9–1.3)
GFR AFRICAN AMERICAN: >60
GFR NON-AFRICAN AMERICAN: 52
GLOBULIN: 3.3 G/DL
GLUCOSE BLD-MCNC: 117 MG/DL (ref 70–99)
GLUCOSE URINE, POC: NEGATIVE
HDLC SERPL-MCNC: 38 MG/DL (ref 40–60)
KETONES, POC: NEGATIVE
LDL CHOLESTEROL CALCULATED: 103 MG/DL
LEUKOCYTE EST, POC: NEGATIVE
NITRITE, POC: NEGATIVE
PH, POC: 6.5
POTASSIUM SERPL-SCNC: 4.6 MMOL/L (ref 3.5–5.1)
PROSTATE SPECIFIC ANTIGEN: 5.99 NG/ML (ref 0–4)
PROTEIN, POC: NORMAL
SODIUM BLD-SCNC: 142 MMOL/L (ref 136–145)
SPECIFIC GRAVITY, POC: 1.02
TOTAL PROTEIN: 7.4 G/DL (ref 6.4–8.2)
TRIGL SERPL-MCNC: 81 MG/DL (ref 0–150)
UROBILINOGEN, POC: 1
VLDLC SERPL CALC-MCNC: 16 MG/DL

## 2019-08-01 PROCEDURE — 93000 ELECTROCARDIOGRAM COMPLETE: CPT | Performed by: NURSE PRACTITIONER

## 2019-08-01 PROCEDURE — 99214 OFFICE O/P EST MOD 30 MIN: CPT | Performed by: FAMILY MEDICINE

## 2019-08-01 PROCEDURE — 81002 URINALYSIS NONAUTO W/O SCOPE: CPT | Performed by: FAMILY MEDICINE

## 2019-08-01 PROCEDURE — 99213 OFFICE O/P EST LOW 20 MIN: CPT | Performed by: NURSE PRACTITIONER

## 2019-08-01 RX ORDER — ATORVASTATIN CALCIUM 40 MG/1
40 TABLET, FILM COATED ORAL DAILY
Qty: 30 TABLET | Refills: 11 | Status: SHIPPED | OUTPATIENT
Start: 2019-08-01 | End: 2020-02-10 | Stop reason: SDUPTHER

## 2019-08-01 ASSESSMENT — ENCOUNTER SYMPTOMS
CHEST TIGHTNESS: 0
SHORTNESS OF BREATH: 0

## 2019-08-01 NOTE — PATIENT INSTRUCTIONS
Walking for Exercise: Care Instructions  Your Care Instructions    Walking is one of the easiest ways to get the exercise you need for good health. A brisk, 30-minute walk each day can help you feel better and have more energy. It can help you lower your risk of disease. Walking can help you keep your bones strong and your heart healthy. Check with your doctor before you start a walking plan if you have heart problems, other health issues, or you have not been active in a long time. Follow your doctor's instructions for safe levels of exercise. Follow-up care is a key part of your treatment and safety. Be sure to make and go to all appointments, and call your doctor if you are having problems. It's also a good idea to know your test results and keep a list of the medicines you take. How can you care for yourself at home? Getting started  · Start slowly and set a short-term goal. For example, walk for 5 or 10 minutes every day. · Bit by bit, increase the amount you walk every day. Try for at least 30 minutes on most days of the week. You also may want to swim, bike, or do other activities. · If finding enough time is a problem, it is fine to be active in blocks of 10 minutes or more throughout your day and week. · To get the heart-healthy benefits of walking, you need to walk briskly enough to increase your heart rate and breathing, but not so fast that you cannot talk comfortably. · Wear comfortable shoes that fit well and provide good support for your feet and ankles. Staying with your plan  · After you've made walking a habit, set a longer-term goal. You may want to set a goal of walking briskly for longer or walking farther. Experts say to do 2½ hours of moderate activity a week. A faster heartbeat is what defines moderate-level activity. · To stay motivated, walk with friends, coworkers, or pets. · Use a phone fadumo or pedometer to track your steps each day. Set a goal to increase your steps.  Once you get there, set a higher goal. Aim for 10,000 steps a day. · If the weather keeps you from walking outside, go for walks at the mall with a friend. Local schools and churches may have indoor gyms where you can walk. Fitting a walk into your workday  · Park several blocks away from work, or get off the bus a few stops early. · Use the stairs instead of the elevator, at least for a few floors. · Suggest holding meetings with colleagues during a walk inside or outside the building. · Use the restroom that is the farthest from your desk or workstation. · Use your morning and afternoon breaks to take quick 15-minute walks. Staying safe  · Know your surroundings. Walk in a well-lighted, safe place. If it is dark, walk with a partner. Wear light-colored clothing. If you can, buy a vest or jacket that reflects light. · Carry a cell phone for emergencies. · Drink plenty of water. Take a water bottle with you when you walk. This is very important if it is hot out. · Be careful not to slip on wet or icy ground. You can buy \"grippers\" for your shoes to help keep you from slipping. · Pay attention to your walking surface. Use sidewalks and paths. · If you have breathing problems like asthma or COPD, ask your doctor when it is safe for you to walk outdoors. Cold, dry air, smog, pollen, or other things in the air could cause breathing problems. Where can you learn more? Go to https://[employer]. org and sign in to your [employer] account. Enter R159 in the [employer] box to learn more about \"Walking for Exercise: Care Instructions. \"     If you do not have an account, please click on the \"Sign Up Now\" link. Current as of: August 19, 2018  Content Version: 12.0  © 9368-8348 Healthwise, Incorporated. Care instructions adapted under license by [facility] ([facility]).  If you have questions about a medical condition or this instruction, always ask your healthcare professional. Micaela Soto disclaims any warranty or liability for your use of this information. The diagnoses and medications listed in this after visit summary may not be accurate at the time of check out. Please check MY CHART in 28-48 hours for possible corrections.

## 2019-08-01 NOTE — PROGRESS NOTES
behavior is normal. Cognition and memory are normal.   Nursing note and vitals reviewed. Vitals:    08/01/19 0857   BP: 128/80   Site: Left Upper Arm   Position: Sitting   Cuff Size: Large Adult   Pulse: 65   Temp: 97.7 °F (36.5 °C)   TempSrc: Oral   Weight: 241 lb (109.3 kg)   Height: 6' (1.829 m)     Body mass index is 32.69 kg/m². Wt Readings from Last 3 Encounters:   08/01/19 241 lb (109.3 kg)   12/31/18 240 lb 3.2 oz (109 kg)   11/07/18 249 lb (112.9 kg)     BP Readings from Last 3 Encounters:   08/01/19 128/80   12/31/18 120/80   11/07/18 138/88          Results for orders placed or performed in visit on 08/01/19   POCT Urinalysis no Micro   Result Value Ref Range    Color, UA yellow     Clarity, UA clear     Glucose, UA POC negative     Bilirubin, UA negative     Ketones, UA negative     Spec Grav, UA 1.020     Blood, UA POC negative     pH, UA 6.5     Protein, UA POC trace     Urobilinogen, UA 1.0     Leukocytes, UA negative     Nitrite, UA negative      The 10-year ASCVD risk score (Renetta Colbert, et al., 2013) is: 14.3%    Values used to calculate the score:      Age: 60 years      Sex: Male      Is Non- : Yes      Diabetic: No      Tobacco smoker: No      Systolic Blood Pressure: 146 mmHg      Is BP treated: Yes      HDL Cholesterol: 28 MG/DL      Total Cholesterol: 161 MG/DL  Lab Review   No visits with results within 2 Month(s) from this visit.    Latest known visit with results is:   Office Visit on 12/31/2018   Component Date Value    Color, UA 12/31/2018 yellow     Clarity, UA 12/31/2018 clear     Glucose, UA POC 12/31/2018 negative     Bilirubin, UA 12/31/2018 negative     Ketones, UA 12/31/2018 negative     Spec Grav, UA 12/31/2018 1.020     Blood, UA POC 12/31/2018 negative     pH, UA 12/31/2018 5.5     Protein, UA POC 12/31/2018 negative     Urobilinogen, UA 12/31/2018 0.2     Leukocytes, UA 12/31/2018 negative     Nitrite, UA 12/31/2018 negative

## 2019-08-01 NOTE — PROGRESS NOTES
BMI 33.04 kg/m²   Wt Readings from Last 3 Encounters:   08/01/19 243 lb 9.6 oz (110.5 kg)   08/01/19 241 lb (109.3 kg)   12/31/18 240 lb 3.2 oz (109 kg)     Body mass index is 33.04 kg/m². GENERAL - Alert, oriented, pleasant, in no apparent distress. Head unremarkable  Eyes - pupils equal and reactive to light - bilateral conjunctiva are pink: sclera are white   ENT - external ears intact, nose is intact:  tongue is midline pink and moist  Neck - Supple. No jugular venous distention noted. No carotid bruits appreciated. Cardiovascular - Normal S1 and S2:  murmur appreciated No, No gallop. Regular rate- Yes,  rhythm regular-Yes. Extremities - No cyanosis, clubbing, +1 edema to lower legs. Pulmonary - No respiratory distress. No wheezes or rales. Chest is clear  Pulses: Bilateral radial and pedal pulses normal  Abdomen -  Soft no tenderness, non distended   Musculoskeletal - Normal movement of all extremities   Neurologic - alert and oriented: There are no gross focal neurologic abnormalities. Skin-  No rash: No ecchymosis, Dark skin discoloration to lateral and anterior L ankle.     Affect- normal mood and pleasant       DATA:  Lab Results   Component Value Date    TROPONINI <0.006 04/03/2012     BNP:    Lab Results   Component Value Date    BNP 19 04/03/2012     PT/INR:  No results found for: PTINR  Lab Results   Component Value Date    LABA1C 5.6 01/08/2018     Lab Results   Component Value Date    CHOL 161 06/01/2017    TRIG 106 06/01/2017    HDL 28 (L) 06/01/2017    LDLCALC 141 (H) 08/04/2016    LDLDIRECT 117 (H) 06/01/2017     Lab Results   Component Value Date    ALT 27 09/28/2018    AST 25 09/28/2018     TSH:    Lab Results   Component Value Date    TSH 1.08 10/22/2014     Revised Cardiac Risk Index:   High risk type of surgery no   H/O ischemic heart disease  (h/o MI, or a positive stress test, current complaint of chest pain considered to be secondary to myocardial ischemia,  Use of nitrate

## 2019-08-02 ENCOUNTER — TELEPHONE (OUTPATIENT)
Dept: CARDIOLOGY CLINIC | Age: 59
End: 2019-08-02

## 2019-08-02 LAB
ESTIMATED AVERAGE GLUCOSE: 116.9 MG/DL
HBA1C MFR BLD: 5.7 %
URINE CULTURE, ROUTINE: NORMAL

## 2019-08-05 ENCOUNTER — TELEPHONE (OUTPATIENT)
Dept: CARDIOLOGY CLINIC | Age: 59
End: 2019-08-05

## 2019-08-07 ENCOUNTER — TELEPHONE (OUTPATIENT)
Dept: CARDIOLOGY CLINIC | Age: 59
End: 2019-08-07

## 2019-08-07 DIAGNOSIS — R97.20 PSA ELEVATION: Primary | ICD-10-CM

## 2019-08-08 ENCOUNTER — TELEPHONE (OUTPATIENT)
Dept: CARDIOLOGY CLINIC | Age: 59
End: 2019-08-08

## 2019-08-14 ENCOUNTER — TELEPHONE (OUTPATIENT)
Dept: CARDIOLOGY CLINIC | Age: 59
End: 2019-08-14

## 2019-08-20 ENCOUNTER — PROCEDURE VISIT (OUTPATIENT)
Dept: CARDIOLOGY CLINIC | Age: 59
End: 2019-08-20
Payer: COMMERCIAL

## 2019-08-20 DIAGNOSIS — M79.662 PAIN IN BOTH LOWER LEGS: Primary | ICD-10-CM

## 2019-08-20 DIAGNOSIS — M79.661 PAIN IN BOTH LOWER LEGS: Primary | ICD-10-CM

## 2019-08-20 PROCEDURE — 93970 EXTREMITY STUDY: CPT | Performed by: INTERNAL MEDICINE

## 2019-08-21 ENCOUNTER — TELEPHONE (OUTPATIENT)
Dept: CARDIOLOGY CLINIC | Age: 59
End: 2019-08-21

## 2020-02-10 ENCOUNTER — OFFICE VISIT (OUTPATIENT)
Dept: CARDIOLOGY CLINIC | Age: 60
End: 2020-02-10
Payer: COMMERCIAL

## 2020-02-10 VITALS
DIASTOLIC BLOOD PRESSURE: 110 MMHG | BODY MASS INDEX: 33.92 KG/M2 | HEIGHT: 72 IN | HEART RATE: 57 BPM | SYSTOLIC BLOOD PRESSURE: 160 MMHG | WEIGHT: 250.4 LBS

## 2020-02-10 PROBLEM — I73.9 CLAUDICATION (HCC): Status: ACTIVE | Noted: 2020-02-10

## 2020-02-10 PROCEDURE — 93000 ELECTROCARDIOGRAM COMPLETE: CPT | Performed by: INTERNAL MEDICINE

## 2020-02-10 PROCEDURE — 99214 OFFICE O/P EST MOD 30 MIN: CPT | Performed by: INTERNAL MEDICINE

## 2020-02-10 RX ORDER — PRASUGREL 10 MG/1
10 TABLET, FILM COATED ORAL DAILY
Qty: 30 TABLET | Refills: 5 | Status: SHIPPED | OUTPATIENT
Start: 2020-02-10 | End: 2020-02-10 | Stop reason: ALTCHOICE

## 2020-02-10 RX ORDER — LISINOPRIL 20 MG/1
20 TABLET ORAL DAILY
Qty: 30 TABLET | Refills: 2 | Status: ON HOLD | OUTPATIENT
Start: 2020-02-10 | End: 2021-08-26 | Stop reason: HOSPADM

## 2020-02-10 RX ORDER — CLOPIDOGREL BISULFATE 75 MG/1
75 TABLET ORAL DAILY
Qty: 90 TABLET | Refills: 3 | Status: ON HOLD | OUTPATIENT
Start: 2020-02-10 | End: 2021-08-26 | Stop reason: SDUPTHER

## 2020-02-10 RX ORDER — METOPROLOL TARTRATE 50 MG/1
50 TABLET, FILM COATED ORAL 2 TIMES DAILY
Qty: 60 TABLET | Refills: 5 | Status: ON HOLD | OUTPATIENT
Start: 2020-02-10 | End: 2021-08-26 | Stop reason: SDUPTHER

## 2020-02-10 RX ORDER — ATORVASTATIN CALCIUM 40 MG/1
40 TABLET, FILM COATED ORAL DAILY
Qty: 30 TABLET | Refills: 11 | Status: ON HOLD | OUTPATIENT
Start: 2020-02-10 | End: 2021-08-26 | Stop reason: SDUPTHER

## 2020-02-10 RX ORDER — AMLODIPINE BESYLATE 10 MG/1
10 TABLET ORAL DAILY
Qty: 30 TABLET | Refills: 2 | Status: ON HOLD | OUTPATIENT
Start: 2020-02-10 | End: 2021-08-26 | Stop reason: SDUPTHER

## 2020-02-10 NOTE — LETTER
CLINICAL STAFF DOCUMENTATION    Delia Kelly  1960  [id number]    Have you had any Chest Pain - Yes, nothing makes it better. Exertion makes it worse.  Hurts to take a deep breath in   If Yes DO EKG - How does it feel - Dull Ache   How long does the pain last - minutes   How long have you been having the pain - Months       Have you had any Shortness of Breath - occassionally   If Yes - When on exertion    Have you had any dizziness - No      Have you had any palpitations - Yes  If Yes DO EKG - Do you feel your heart pounding and racing   How long does it last - minutes         Do you have any edema - swelling in legs    If Yes - CHECK TO SEE IF THE EDEMA IS PITTING  How long have they been having edema - Years        Do you have a surgery or procedure scheduled in the near future - No

## 2020-02-10 NOTE — PROGRESS NOTES
Father 80           [facility] Breast Cancer Mother     Alzheimer's Disease Mother          80    Alcohol Abuse Brother     Cancer Brother         type unknown    Hypertension Brother     Learning Disabilities Brother     Heart Disease Brother     Heart Disease Brother      Social History     Tobacco Use    Smoking status: Former Smoker     Packs/day: 0.50     Years: 30.00     Pack years: 15.00     Types: Cigarettes     Last attempt to quit: 2017     Years since quitting: 3.1    Smokeless tobacco: Never Used   Substance Use Topics    Alcohol use: Yes     Comment: rarely        Review of Systems:   · Constitutional: No Fever or Weight Loss   · Eyes: No Decreased Vision  · ENT: No Headaches, Hearing Loss or Vertigo  · Cardiovascular: as per note above   · Respiratory: No cough or wheezing and as per note above. · Gastrointestinal: No abdominal pain, appetite loss, blood in stools, constipation, diarrhea or heartburn  · Genitourinary: No dysuria, trouble voiding, or hematuria  · Musculoskeletal:  None  · Integumentary: No rash or pruritis  · Neurological: No TIA or stroke symptoms  · Psychiatric: No anxiety or depression  · Endocrine: No malaise, fatigue or temperature intolerance  · Hematologic/Lymphatic: No bleeding problems, blood clots or swollen lymph nodes  · Allergic/Immunologic: No nasal congestion or hives    Objective:      Physical Exam:  BP (!) 160/110   Pulse 57   Ht 6' (1.829 m)   Wt 250 lb 6.4 oz (113.6 kg)   BMI 33.96 kg/m²   Wt Readings from Last 3 Encounters:   02/10/20 250 lb 6.4 oz (113.6 kg)   19 243 lb 9.6 oz (110.5 kg)   19 241 lb (109.3 kg)     Body mass index is 33.96 kg/m². Vitals:    02/10/20 1444   BP: (!) 160/110   Pulse:         General Appearance:  No distress, conversant  Constitutional:  Well developed, Well nourished, No acute distress, Non-toxic appearance.    HENT:  Normocephalic, Atraumatic, Bilateral external ears normal, Oropharynx moist, No oral subsection.     Lesion on Mid RCA: 90% stenosis.   Interventional procedure  Cardiac lesions  LAD:     Lesion on Mid LAD: 70% stenosis.  RCA:     Lesion on Mid RCA: 90% stenosis.      - 3.5 x 23 mm Xience Alpine Drug Eluting Stent. Diameter: 3.5 mm. Length:    23 mm. 3 inflation(s) to a max pressure of: 15 sharon.     Lesion on R PDA: 99% stenosis.    - 2.5 x 20 mm Trek RX Balloon. Diameter: 2.5 mm. Length: 20 mm. 5    inflation(s) to a max pressure of: 20 sharon.     Cath 6/9 18    Procedure Summary   Radial access, LVEDP was 12 mmHG   1. Successful PCI using 3.25 X 32 Xience stent for 80% mid LAD   lesion reduced to 0% stenosis. Ostial third diagonal was   pinched, but had DB-3 flow. Therefore, no further intervention   was done   2. RCA stents are patent   3. OM has proximal 50% stenosis   4. RV marginal has 70-80% stenosis     Angiographic Findings    Diagnostic Findings    Cardiac Arteries and Lesion Findings   LMCA: Normal (no stenosis %) and No Angiographicalyl Significant Disease. LAD: Abnormal.mid segment has 80 % lesion, 3 diagonal branches , 2nd diagonal  has 50 % stenosis. hIgh grade 80 to 90 % stenosis at 3rd diagonal take off     Lesion on Mid LAD: 80% stenosis. LCx: Abnormal.ostial 40 % stenosis and , 1st OM has 40 to 50 % stenosis  RCA: Abnormal.Mid RCA has a stent which is patent, RV marginal has 80 % lesion  , PAD is patent and distal PLB branch is occludedThere is a previous stent on  Mid RCA. All labs, medications and tests reviewed by myself including data and history from outside source , patient and available family . Assessment & Plan:      1. Chest pain, unspecified type    2. Coronary artery disease involving native coronary artery of native heart with unstable angina pectoris ([address]    3. Coronary artery disease of native artery of native heart with stable angina pectoris ([address]    4. Depression, unspecified depression type    5. Dyslipidemia    6. Essential hypertension    7.  S/P PTCA (percutaneous transluminal coronary angioplasty)    8. ASCVD (arteriosclerotic cardiovascular disease)    9. Pain in both lower legs    10. Claudication [facility])         Coronary artery disease of native artery of native heart with stable angina pectoris [facility])  H/O PCI to RCA initially in June 2017, he had repeat PCI for in stent restenosis  in May 2018 but he continued to have symptoms at work so  HE had LAD intervention in June 2018 stop effient , start plavix due to cost issues   We tried medical therapy extensively including Ranexa, metoprolol, Norvasc. After failing medical therapy. He presented again to the emergency department and had intervention done in May 2018. For now Continue DAPT for as long as possible  He was dizzy on ranexa? Imdur gave him headaches . He is on norvasc and metoprolol ( he doesnot want any furtehr med titration. Higher dose of metoprolol makes him tired. Claudication  check arterial doppler    Essential hypertension  Restart amlodipine and lisinopril , metoprolol      Dyslipidemia :  Anita Sosa had lab work recently,  Lipid profile was reviewed with patient. continue lipitor 40 mg     Counseled extensively and medication compliance urged. We discussed that for the  prevention of ASCVD our  goal is aggressive risk modification. Patient is encouraged to exercise even a brisk walk for 30 minutes  at least 3 to 4 times a week   Various goals were discussed and questions answered. Continue current medications. Appropriate prescriptions are addressed and refills ordered. Questions answered and patient verbalizes understanding. Call for any problems, questions, or concerns. Continue all other medications of all above medical condition listed as is. Return in about 6 months (around 8/10/2020).

## 2020-02-13 ENCOUNTER — PROCEDURE VISIT (OUTPATIENT)
Dept: CARDIOLOGY CLINIC | Age: 60
End: 2020-02-13
Payer: COMMERCIAL

## 2020-02-13 PROCEDURE — 93925 LOWER EXTREMITY STUDY: CPT | Performed by: INTERNAL MEDICINE

## 2020-02-17 ENCOUNTER — TELEPHONE (OUTPATIENT)
Dept: CARDIOLOGY CLINIC | Age: 60
End: 2020-02-17

## 2021-08-24 ENCOUNTER — APPOINTMENT (OUTPATIENT)
Dept: GENERAL RADIOLOGY | Age: 61
DRG: 683 | End: 2021-08-24

## 2021-08-24 PROCEDURE — 93005 ELECTROCARDIOGRAM TRACING: CPT | Performed by: EMERGENCY MEDICINE

## 2021-08-24 PROCEDURE — 71046 X-RAY EXAM CHEST 2 VIEWS: CPT

## 2021-08-24 PROCEDURE — 99285 EMERGENCY DEPT VISIT HI MDM: CPT

## 2021-08-24 RX ORDER — ACETAMINOPHEN 500 MG
1000 TABLET ORAL ONCE
Status: COMPLETED | OUTPATIENT
Start: 2021-08-25 | End: 2021-08-25

## 2021-08-24 RX ORDER — NITROGLYCERIN 0.4 MG/1
0.4 TABLET SUBLINGUAL ONCE
Status: COMPLETED | OUTPATIENT
Start: 2021-08-25 | End: 2021-08-25

## 2021-08-24 ASSESSMENT — PAIN DESCRIPTION - ORIENTATION: ORIENTATION: LEFT

## 2021-08-24 ASSESSMENT — PAIN DESCRIPTION - LOCATION: LOCATION: CHEST;ARM

## 2021-08-24 ASSESSMENT — PAIN SCALES - GENERAL: PAINLEVEL_OUTOF10: 6

## 2021-08-24 ASSESSMENT — PAIN DESCRIPTION - FREQUENCY: FREQUENCY: INTERMITTENT

## 2021-08-24 ASSESSMENT — PAIN DESCRIPTION - DESCRIPTORS: DESCRIPTORS: CRUSHING;HEAVINESS

## 2021-08-24 ASSESSMENT — PAIN DESCRIPTION - ONSET: ONSET: PROGRESSIVE

## 2021-08-25 ENCOUNTER — APPOINTMENT (OUTPATIENT)
Dept: CT IMAGING | Age: 61
DRG: 683 | End: 2021-08-25

## 2021-08-25 ENCOUNTER — HOSPITAL ENCOUNTER (INPATIENT)
Age: 61
LOS: 1 days | Discharge: HOME | DRG: 683 | End: 2021-08-26
Attending: EMERGENCY MEDICINE | Admitting: STUDENT IN AN ORGANIZED HEALTH CARE EDUCATION/TRAINING PROGRAM

## 2021-08-25 ENCOUNTER — APPOINTMENT (OUTPATIENT)
Dept: NUCLEAR MEDICINE | Age: 61
DRG: 683 | End: 2021-08-25

## 2021-08-25 DIAGNOSIS — I10 UNCONTROLLED HYPERTENSION: ICD-10-CM

## 2021-08-25 DIAGNOSIS — R35.0 URINARY FREQUENCY: ICD-10-CM

## 2021-08-25 DIAGNOSIS — I10 HYPERTENSION: ICD-10-CM

## 2021-08-25 DIAGNOSIS — I20.0 UNSTABLE ANGINA (HCC): Primary | ICD-10-CM

## 2021-08-25 PROBLEM — R07.9 CHEST PAIN: Status: ACTIVE | Noted: 2021-08-25

## 2021-08-25 LAB
ALBUMIN SERPL-MCNC: 4.6 GM/DL (ref 3.4–5)
ALP BLD-CCNC: 83 IU/L (ref 40–129)
ALT SERPL-CCNC: 19 U/L (ref 10–40)
ANION GAP SERPL CALCULATED.3IONS-SCNC: 10 MMOL/L (ref 4–16)
APTT: 27.8 SECONDS (ref 25.1–37.1)
AST SERPL-CCNC: 19 IU/L (ref 15–37)
BACTERIA: NEGATIVE /HPF
BACTERIA: NEGATIVE /HPF
BASOPHILS ABSOLUTE: 0.1 K/CU MM
BASOPHILS RELATIVE PERCENT: 1 % (ref 0–1)
BILIRUB SERPL-MCNC: 0.3 MG/DL (ref 0–1)
BILIRUBIN URINE: NEGATIVE MG/DL
BILIRUBIN URINE: NEGATIVE MG/DL
BLOOD, URINE: NEGATIVE
BLOOD, URINE: NEGATIVE
BUN BLDV-MCNC: 17 MG/DL (ref 6–23)
CALCIUM SERPL-MCNC: 9.2 MG/DL (ref 8.3–10.6)
CHLORIDE BLD-SCNC: 102 MMOL/L (ref 99–110)
CHLORIDE URINE RANDOM: 125 MMOL/L (ref 43–210)
CHOLESTEROL: 206 MG/DL
CLARITY: CLEAR
CLARITY: CLEAR
CO2: 24 MMOL/L (ref 21–32)
COLOR: YELLOW
COLOR: YELLOW
CREAT SERPL-MCNC: 1.9 MG/DL (ref 0.9–1.3)
CREATININE URINE: 64 MG/DL (ref 39–259)
CREATININE URINE: 80.1 MG/DL
D DIMER: 447 NG/ML(DDU)
DIFFERENTIAL TYPE: ABNORMAL
EKG ATRIAL RATE: 66 BPM
EKG DIAGNOSIS: NORMAL
EKG P AXIS: 65 DEGREES
EKG P-R INTERVAL: 138 MS
EKG Q-T INTERVAL: 398 MS
EKG QRS DURATION: 94 MS
EKG QTC CALCULATION (BAZETT): 417 MS
EKG R AXIS: 9 DEGREES
EKG T AXIS: 64 DEGREES
EKG VENTRICULAR RATE: 66 BPM
EOSINOPHILS ABSOLUTE: 0.2 K/CU MM
EOSINOPHILS RELATIVE PERCENT: 4.1 % (ref 0–3)
ESTIMATED AVERAGE GLUCOSE: 100 MG/DL
GFR AFRICAN AMERICAN: 44 ML/MIN/1.73M2
GFR NON-AFRICAN AMERICAN: 36 ML/MIN/1.73M2
GLUCOSE BLD-MCNC: 85 MG/DL (ref 70–99)
GLUCOSE, URINE: NEGATIVE MG/DL
GLUCOSE, URINE: NEGATIVE MG/DL
HBA1C MFR BLD: 5.1 % (ref 4.2–6.3)
HCT VFR BLD CALC: 51.7 % (ref 42–52)
HDLC SERPL-MCNC: 33 MG/DL
HEMOGLOBIN: 16.9 GM/DL (ref 13.5–18)
IMMATURE NEUTROPHIL %: 0.2 % (ref 0–0.43)
INR BLD: 0.97 INDEX
KETONES, URINE: NEGATIVE MG/DL
KETONES, URINE: NEGATIVE MG/DL
LDL CHOLESTEROL DIRECT: 150 MG/DL
LEUKOCYTE ESTERASE, URINE: ABNORMAL
LEUKOCYTE ESTERASE, URINE: ABNORMAL
LIPASE: 41 IU/L (ref 13–60)
LV EF: 53 %
LV EF: 53 %
LVEF MODALITY: NORMAL
LVEF MODALITY: NORMAL
LYMPHOCYTES ABSOLUTE: 1.3 K/CU MM
LYMPHOCYTES RELATIVE PERCENT: 25.5 % (ref 24–44)
MCH RBC QN AUTO: 30 PG (ref 27–31)
MCHC RBC AUTO-ENTMCNC: 32.7 % (ref 32–36)
MCV RBC AUTO: 91.8 FL (ref 78–100)
MONOCYTES ABSOLUTE: 0.7 K/CU MM
MONOCYTES RELATIVE PERCENT: 13.2 % (ref 0–4)
MUCUS: ABNORMAL HPF
MUCUS: ABNORMAL HPF
NITRITE URINE, QUANTITATIVE: NEGATIVE
NITRITE URINE, QUANTITATIVE: POSITIVE
NUCLEATED RBC %: 0 %
PDW BLD-RTO: 14.6 % (ref 11.7–14.9)
PH, URINE: 7 (ref 5–8)
PH, URINE: 7 (ref 5–8)
PLATELET # BLD: 241 K/CU MM (ref 140–440)
PMV BLD AUTO: 9.7 FL (ref 7.5–11.1)
POTASSIUM SERPL-SCNC: 4.3 MMOL/L (ref 3.5–5.1)
POTASSIUM, UR: 32.2 MMOL/L (ref 22–119)
PROT/CREAT RATIO, UR: 0.2
PROTEIN UA: NEGATIVE MG/DL
PROTEIN UA: NEGATIVE MG/DL
PROTHROMBIN TIME: 12.5 SECONDS (ref 11.7–14.5)
RBC # BLD: 5.63 M/CU MM (ref 4.6–6.2)
RBC URINE: 1 /HPF (ref 0–3)
RBC URINE: 1 /HPF (ref 0–3)
SEGMENTED NEUTROPHILS ABSOLUTE COUNT: 2.9 K/CU MM
SEGMENTED NEUTROPHILS RELATIVE PERCENT: 56 % (ref 36–66)
SODIUM BLD-SCNC: 136 MMOL/L (ref 135–145)
SODIUM URINE: 117 MMOL/L (ref 35–167)
SODIUM URINE: 126 MMOL/L (ref 35–167)
SPECIFIC GRAVITY UA: 1.01 (ref 1–1.03)
SPECIFIC GRAVITY UA: 1.02 (ref 1–1.03)
SQUAMOUS EPITHELIAL: <1 /HPF
TOTAL IMMATURE NEUTOROPHIL: 0.01 K/CU MM
TOTAL NUCLEATED RBC: 0 K/CU MM
TOTAL PROTEIN: 7.6 GM/DL (ref 6.4–8.2)
TRICHOMONAS: ABNORMAL /HPF
TRICHOMONAS: ABNORMAL /HPF
TRIGL SERPL-MCNC: 118 MG/DL
TROPONIN T: <0.01 NG/ML
URINE TOTAL PROTEIN: 11.4 MG/DL
UROBILINOGEN, URINE: NEGATIVE MG/DL (ref 0.2–1)
UROBILINOGEN, URINE: NEGATIVE MG/DL (ref 0.2–1)
WBC # BLD: 5.1 K/CU MM (ref 4–10.5)
WBC UA: 11 /HPF (ref 0–2)
WBC UA: 9 /HPF (ref 0–2)

## 2021-08-25 PROCEDURE — 6370000000 HC RX 637 (ALT 250 FOR IP): Performed by: EMERGENCY MEDICINE

## 2021-08-25 PROCEDURE — 6360000002 HC RX W HCPCS: Performed by: INTERNAL MEDICINE

## 2021-08-25 PROCEDURE — 93017 CV STRESS TEST TRACING ONLY: CPT

## 2021-08-25 PROCEDURE — 6360000004 HC RX CONTRAST MEDICATION: Performed by: EMERGENCY MEDICINE

## 2021-08-25 PROCEDURE — 36415 COLL VENOUS BLD VENIPUNCTURE: CPT

## 2021-08-25 PROCEDURE — 6360000002 HC RX W HCPCS: Performed by: STUDENT IN AN ORGANIZED HEALTH CARE EDUCATION/TRAINING PROGRAM

## 2021-08-25 PROCEDURE — 2500000003 HC RX 250 WO HCPCS: Performed by: INTERNAL MEDICINE

## 2021-08-25 PROCEDURE — 2140000000 HC CCU INTERMEDIATE R&B

## 2021-08-25 PROCEDURE — 78452 HT MUSCLE IMAGE SPECT MULT: CPT

## 2021-08-25 PROCEDURE — 83036 HEMOGLOBIN GLYCOSYLATED A1C: CPT

## 2021-08-25 PROCEDURE — 83690 ASSAY OF LIPASE: CPT

## 2021-08-25 PROCEDURE — 85025 COMPLETE CBC W/AUTO DIFF WBC: CPT

## 2021-08-25 PROCEDURE — 82436 ASSAY OF URINE CHLORIDE: CPT

## 2021-08-25 PROCEDURE — 93005 ELECTROCARDIOGRAM TRACING: CPT | Performed by: INTERNAL MEDICINE

## 2021-08-25 PROCEDURE — 3430000000 HC RX DIAGNOSTIC RADIOPHARMACEUTICAL: Performed by: INTERNAL MEDICINE

## 2021-08-25 PROCEDURE — 85730 THROMBOPLASTIN TIME PARTIAL: CPT

## 2021-08-25 PROCEDURE — 85610 PROTHROMBIN TIME: CPT

## 2021-08-25 PROCEDURE — 93010 ELECTROCARDIOGRAM REPORT: CPT | Performed by: INTERNAL MEDICINE

## 2021-08-25 PROCEDURE — 80053 COMPREHEN METABOLIC PANEL: CPT

## 2021-08-25 PROCEDURE — 2580000003 HC RX 258: Performed by: STUDENT IN AN ORGANIZED HEALTH CARE EDUCATION/TRAINING PROGRAM

## 2021-08-25 PROCEDURE — 84300 ASSAY OF URINE SODIUM: CPT

## 2021-08-25 PROCEDURE — 93306 TTE W/DOPPLER COMPLETE: CPT

## 2021-08-25 PROCEDURE — 80061 LIPID PANEL: CPT

## 2021-08-25 PROCEDURE — 2580000003 HC RX 258: Performed by: EMERGENCY MEDICINE

## 2021-08-25 PROCEDURE — 85379 FIBRIN DEGRADATION QUANT: CPT

## 2021-08-25 PROCEDURE — 83721 ASSAY OF BLOOD LIPOPROTEIN: CPT

## 2021-08-25 PROCEDURE — 99253 IP/OBS CNSLTJ NEW/EST LOW 45: CPT | Performed by: INTERNAL MEDICINE

## 2021-08-25 PROCEDURE — 2580000003 HC RX 258: Performed by: INTERNAL MEDICINE

## 2021-08-25 PROCEDURE — 82570 ASSAY OF URINE CREATININE: CPT

## 2021-08-25 PROCEDURE — 6370000000 HC RX 637 (ALT 250 FOR IP): Performed by: INTERNAL MEDICINE

## 2021-08-25 PROCEDURE — 6370000000 HC RX 637 (ALT 250 FOR IP): Performed by: STUDENT IN AN ORGANIZED HEALTH CARE EDUCATION/TRAINING PROGRAM

## 2021-08-25 PROCEDURE — 84133 ASSAY OF URINE POTASSIUM: CPT

## 2021-08-25 PROCEDURE — A9500 TC99M SESTAMIBI: HCPCS | Performed by: INTERNAL MEDICINE

## 2021-08-25 PROCEDURE — 84484 ASSAY OF TROPONIN QUANT: CPT

## 2021-08-25 PROCEDURE — 6360000002 HC RX W HCPCS: Performed by: CLINICAL NURSE SPECIALIST

## 2021-08-25 PROCEDURE — 84156 ASSAY OF PROTEIN URINE: CPT

## 2021-08-25 PROCEDURE — 71275 CT ANGIOGRAPHY CHEST: CPT

## 2021-08-25 PROCEDURE — 81001 URINALYSIS AUTO W/SCOPE: CPT

## 2021-08-25 RX ORDER — HYDRALAZINE HYDROCHLORIDE 20 MG/ML
10 INJECTION INTRAMUSCULAR; INTRAVENOUS ONCE
Status: COMPLETED | OUTPATIENT
Start: 2021-08-25 | End: 2021-08-25

## 2021-08-25 RX ORDER — HEPARIN SODIUM 5000 [USP'U]/ML
5000 INJECTION, SOLUTION INTRAVENOUS; SUBCUTANEOUS EVERY 8 HOURS SCHEDULED
Status: DISCONTINUED | OUTPATIENT
Start: 2021-08-25 | End: 2021-08-26 | Stop reason: HOSPADM

## 2021-08-25 RX ORDER — HYDRALAZINE HYDROCHLORIDE 20 MG/ML
10 INJECTION INTRAMUSCULAR; INTRAVENOUS EVERY 6 HOURS PRN
Status: DISCONTINUED | OUTPATIENT
Start: 2021-08-25 | End: 2021-08-26 | Stop reason: HOSPADM

## 2021-08-25 RX ORDER — ONDANSETRON 4 MG/1
4 TABLET, ORALLY DISINTEGRATING ORAL EVERY 8 HOURS PRN
Status: DISCONTINUED | OUTPATIENT
Start: 2021-08-25 | End: 2021-08-26 | Stop reason: HOSPADM

## 2021-08-25 RX ORDER — AMLODIPINE BESYLATE 5 MG/1
10 TABLET ORAL DAILY
Status: DISCONTINUED | OUTPATIENT
Start: 2021-08-25 | End: 2021-08-26 | Stop reason: HOSPADM

## 2021-08-25 RX ORDER — NITROGLYCERIN 0.4 MG/1
0.4 TABLET SUBLINGUAL EVERY 5 MIN PRN
Status: DISCONTINUED | OUTPATIENT
Start: 2021-08-25 | End: 2021-08-25 | Stop reason: SDUPTHER

## 2021-08-25 RX ORDER — NITROGLYCERIN 0.4 MG/1
0.4 TABLET SUBLINGUAL EVERY 5 MIN PRN
Status: DISCONTINUED | OUTPATIENT
Start: 2021-08-25 | End: 2021-08-26 | Stop reason: HOSPADM

## 2021-08-25 RX ORDER — ACETAMINOPHEN 325 MG/1
650 TABLET ORAL EVERY 6 HOURS PRN
Status: DISCONTINUED | OUTPATIENT
Start: 2021-08-25 | End: 2021-08-26 | Stop reason: HOSPADM

## 2021-08-25 RX ORDER — ACETAMINOPHEN 650 MG/1
650 SUPPOSITORY RECTAL EVERY 6 HOURS PRN
Status: DISCONTINUED | OUTPATIENT
Start: 2021-08-25 | End: 2021-08-26 | Stop reason: HOSPADM

## 2021-08-25 RX ORDER — CLONIDINE HYDROCHLORIDE 0.1 MG/1
0.1 TABLET ORAL 2 TIMES DAILY
Status: DISCONTINUED | OUTPATIENT
Start: 2021-08-25 | End: 2021-08-26 | Stop reason: HOSPADM

## 2021-08-25 RX ORDER — MORPHINE SULFATE 4 MG/ML
2 INJECTION, SOLUTION INTRAMUSCULAR; INTRAVENOUS ONCE
Status: COMPLETED | OUTPATIENT
Start: 2021-08-25 | End: 2021-08-25

## 2021-08-25 RX ORDER — METOPROLOL TARTRATE 50 MG/1
50 TABLET, FILM COATED ORAL 2 TIMES DAILY
Status: DISCONTINUED | OUTPATIENT
Start: 2021-08-25 | End: 2021-08-26 | Stop reason: HOSPADM

## 2021-08-25 RX ORDER — ONDANSETRON 2 MG/ML
4 INJECTION INTRAMUSCULAR; INTRAVENOUS EVERY 6 HOURS PRN
Status: DISCONTINUED | OUTPATIENT
Start: 2021-08-25 | End: 2021-08-26 | Stop reason: HOSPADM

## 2021-08-25 RX ORDER — SODIUM CHLORIDE 0.9 % (FLUSH) 0.9 %
5-40 SYRINGE (ML) INJECTION 2 TIMES DAILY
Status: DISCONTINUED | OUTPATIENT
Start: 2021-08-25 | End: 2021-08-26 | Stop reason: HOSPADM

## 2021-08-25 RX ORDER — LISINOPRIL 20 MG/1
20 TABLET ORAL ONCE
Status: COMPLETED | OUTPATIENT
Start: 2021-08-25 | End: 2021-08-25

## 2021-08-25 RX ORDER — 0.9 % SODIUM CHLORIDE 0.9 %
500 INTRAVENOUS SOLUTION INTRAVENOUS ONCE
Status: COMPLETED | OUTPATIENT
Start: 2021-08-25 | End: 2021-08-25

## 2021-08-25 RX ORDER — SODIUM CHLORIDE 9 MG/ML
INJECTION, SOLUTION INTRAVENOUS CONTINUOUS
Status: DISCONTINUED | OUTPATIENT
Start: 2021-08-25 | End: 2021-08-26 | Stop reason: HOSPADM

## 2021-08-25 RX ORDER — ATORVASTATIN CALCIUM 20 MG/1
40 TABLET, FILM COATED ORAL DAILY
Status: DISCONTINUED | OUTPATIENT
Start: 2021-08-25 | End: 2021-08-26 | Stop reason: HOSPADM

## 2021-08-25 RX ORDER — SODIUM CHLORIDE 0.9 % (FLUSH) 0.9 %
5-40 SYRINGE (ML) INJECTION PRN
Status: DISCONTINUED | OUTPATIENT
Start: 2021-08-25 | End: 2021-08-26 | Stop reason: HOSPADM

## 2021-08-25 RX ORDER — AMLODIPINE BESYLATE 5 MG/1
10 TABLET ORAL ONCE
Status: COMPLETED | OUTPATIENT
Start: 2021-08-25 | End: 2021-08-25

## 2021-08-25 RX ORDER — FINASTERIDE 5 MG/1
5 TABLET, FILM COATED ORAL DAILY
Status: DISCONTINUED | OUTPATIENT
Start: 2021-08-25 | End: 2021-08-26 | Stop reason: HOSPADM

## 2021-08-25 RX ORDER — SODIUM CHLORIDE 9 MG/ML
25 INJECTION, SOLUTION INTRAVENOUS PRN
Status: DISCONTINUED | OUTPATIENT
Start: 2021-08-25 | End: 2021-08-26 | Stop reason: HOSPADM

## 2021-08-25 RX ORDER — POLYETHYLENE GLYCOL 3350 17 G/17G
17 POWDER, FOR SOLUTION ORAL DAILY PRN
Status: DISCONTINUED | OUTPATIENT
Start: 2021-08-25 | End: 2021-08-26 | Stop reason: HOSPADM

## 2021-08-25 RX ORDER — SODIUM CHLORIDE 0.9 % (FLUSH) 0.9 %
5-40 SYRINGE (ML) INJECTION EVERY 12 HOURS SCHEDULED
Status: DISCONTINUED | OUTPATIENT
Start: 2021-08-25 | End: 2021-08-26 | Stop reason: HOSPADM

## 2021-08-25 RX ORDER — CLOPIDOGREL BISULFATE 75 MG/1
75 TABLET ORAL DAILY
Status: DISCONTINUED | OUTPATIENT
Start: 2021-08-25 | End: 2021-08-26 | Stop reason: HOSPADM

## 2021-08-25 RX ORDER — ASPIRIN 81 MG/1
81 TABLET, CHEWABLE ORAL DAILY
Status: DISCONTINUED | OUTPATIENT
Start: 2021-08-25 | End: 2021-08-26 | Stop reason: HOSPADM

## 2021-08-25 RX ADMIN — HEPARIN SODIUM 5000 UNITS: 5000 INJECTION INTRAVENOUS; SUBCUTANEOUS at 21:41

## 2021-08-25 RX ADMIN — IOPAMIDOL 100 ML: 755 INJECTION, SOLUTION INTRAVENOUS at 02:37

## 2021-08-25 RX ADMIN — MORPHINE SULFATE 2 MG: 4 INJECTION INTRAVENOUS at 10:30

## 2021-08-25 RX ADMIN — DEXTROSE MONOHYDRATE 5 MG/HR: 50 INJECTION, SOLUTION INTRAVENOUS at 10:30

## 2021-08-25 RX ADMIN — AMLODIPINE BESYLATE 10 MG: 5 TABLET ORAL at 10:17

## 2021-08-25 RX ADMIN — REGADENOSON 0.4 MG: 0.08 INJECTION, SOLUTION INTRAVENOUS at 08:36

## 2021-08-25 RX ADMIN — ACETAMINOPHEN 1000 MG: 500 TABLET, FILM COATED ORAL at 02:20

## 2021-08-25 RX ADMIN — FINASTERIDE 5 MG: 5 TABLET, FILM COATED ORAL at 13:00

## 2021-08-25 RX ADMIN — CLONIDINE HYDROCHLORIDE 0.1 MG: 0.1 TABLET ORAL at 21:40

## 2021-08-25 RX ADMIN — CLOPIDOGREL BISULFATE 75 MG: 75 TABLET ORAL at 10:17

## 2021-08-25 RX ADMIN — ASPIRIN 81 MG: 81 TABLET, CHEWABLE ORAL at 10:16

## 2021-08-25 RX ADMIN — ATORVASTATIN CALCIUM 40 MG: 20 TABLET, FILM COATED ORAL at 10:25

## 2021-08-25 RX ADMIN — METOPROLOL TARTRATE 50 MG: 50 TABLET, FILM COATED ORAL at 10:17

## 2021-08-25 RX ADMIN — HEPARIN SODIUM 5000 UNITS: 5000 INJECTION INTRAVENOUS; SUBCUTANEOUS at 10:26

## 2021-08-25 RX ADMIN — SODIUM CHLORIDE, PRESERVATIVE FREE 10 ML: 5 INJECTION INTRAVENOUS at 02:23

## 2021-08-25 RX ADMIN — SODIUM CHLORIDE 500 ML: 9 INJECTION, SOLUTION INTRAVENOUS at 02:21

## 2021-08-25 RX ADMIN — HYDRALAZINE HYDROCHLORIDE 10 MG: 20 INJECTION INTRAMUSCULAR; INTRAVENOUS at 06:42

## 2021-08-25 RX ADMIN — LISINOPRIL 20 MG: 20 TABLET ORAL at 04:00

## 2021-08-25 RX ADMIN — NITROGLYCERIN 0.4 MG: 0.4 TABLET SUBLINGUAL at 02:20

## 2021-08-25 RX ADMIN — SODIUM CHLORIDE, PRESERVATIVE FREE 10 ML: 5 INJECTION INTRAVENOUS at 21:41

## 2021-08-25 RX ADMIN — SODIUM CHLORIDE, PRESERVATIVE FREE 10 ML: 5 INJECTION INTRAVENOUS at 12:00

## 2021-08-25 RX ADMIN — KIT FOR THE PREPARATION OF TECHNETIUM TC99M SESTAMIBI 30 MILLICURIE: 1 INJECTION, POWDER, LYOPHILIZED, FOR SOLUTION PARENTERAL at 10:09

## 2021-08-25 RX ADMIN — KIT FOR THE PREPARATION OF TECHNETIUM TC99M SESTAMIBI 10 MILLICURIE: 1 INJECTION, POWDER, LYOPHILIZED, FOR SOLUTION PARENTERAL at 10:09

## 2021-08-25 RX ADMIN — METOPROLOL TARTRATE 50 MG: 50 TABLET, FILM COATED ORAL at 21:41

## 2021-08-25 RX ADMIN — AMLODIPINE BESYLATE 10 MG: 5 TABLET ORAL at 04:00

## 2021-08-25 RX ADMIN — SODIUM CHLORIDE, PRESERVATIVE FREE 10 ML: 5 INJECTION INTRAVENOUS at 10:30

## 2021-08-25 RX ADMIN — NITROGLYCERIN 0.4 MG: 0.4 TABLET SUBLINGUAL at 05:03

## 2021-08-25 ASSESSMENT — PAIN SCALES - GENERAL
PAINLEVEL_OUTOF10: 5
PAINLEVEL_OUTOF10: 1
PAINLEVEL_OUTOF10: 6
PAINLEVEL_OUTOF10: 3
PAINLEVEL_OUTOF10: 2
PAINLEVEL_OUTOF10: 3
PAINLEVEL_OUTOF10: 1
PAINLEVEL_OUTOF10: 6
PAINLEVEL_OUTOF10: 6

## 2021-08-25 ASSESSMENT — PAIN DESCRIPTION - LOCATION
LOCATION: CHEST
LOCATION: CHEST;ARM
LOCATION: CHEST

## 2021-08-25 ASSESSMENT — ENCOUNTER SYMPTOMS
CHEST TIGHTNESS: 1
WHEEZING: 0
COLOR CHANGE: 0
VOMITING: 0
CHEST TIGHTNESS: 0
SORE THROAT: 0
SHORTNESS OF BREATH: 1
NAUSEA: 0
DIARRHEA: 0
RHINORRHEA: 0
SHORTNESS OF BREATH: 0
ABDOMINAL PAIN: 0

## 2021-08-25 ASSESSMENT — PAIN DESCRIPTION - ORIENTATION
ORIENTATION: LEFT

## 2021-08-25 ASSESSMENT — PAIN DESCRIPTION - PAIN TYPE
TYPE: ACUTE PAIN

## 2021-08-25 ASSESSMENT — PAIN SCALES - WONG BAKER
WONGBAKER_NUMERICALRESPONSE: 0

## 2021-08-25 ASSESSMENT — PAIN DESCRIPTION - PROGRESSION
CLINICAL_PROGRESSION: NOT CHANGED

## 2021-08-25 ASSESSMENT — PAIN DESCRIPTION - FREQUENCY: FREQUENCY: INTERMITTENT

## 2021-08-25 ASSESSMENT — PAIN DESCRIPTION - ONSET: ONSET: PROGRESSIVE

## 2021-08-25 NOTE — ED NOTES
[facility] [address] [place] [address]  08/25/21 2016

## 2021-08-25 NOTE — ED NOTES
The patient is taken for his stress test to the [address] [place] [address].       Mario Beck RN  08/25/21 9969

## 2021-08-25 NOTE — ED NOTES
Received report from [employer]. The patient is resting at this time. NSR noted on the monitor.       Ravindra Medellin RN  08/25/21 8736

## 2021-08-25 NOTE — CONSULTS
Nephrology Service Consultation    Patient:  Marycruz Felix  MRN: [id number]  Consulting physician:  Natasha Thornton MD  Reason for Consult: Acute renal failure on CKD    History Obtained From:  patient, electronic medical record  PCP: Denisa Garcia MD    HISTORY OF PRESENT ILLNESS:   The patient is a 57 y.o. male who presents with weakness shortness of breath or chest pain. Patient has family history of coronary disease. Patient states he presents with chest pain the day prior to admission going up his chest into his neck. Increase shortness breath dyspnea on exertion with that. Patient denies any prior history of kidney disease patient was worked up for possible PE DVT with CAT scan of the chest with contrast which showed no evidence of PE. Echocardiogram shows fairly stable EF. In the above setting with contrast and diet and chest pain noted to have worsening chronic acute and chronic kidney failure again with baseline kidney function unsure. With the patient being monitored admitted overnight in the above setting with shortness of breath and renal asked to evaluate monitor renal function. Patient apparently has not been taking any medications last [place] Past Medical History:        Diagnosis Date    Blood transfusion 2010    CAD (coronary artery disease)     Chest pain     GERD (gastroesophageal reflux disease)     H/O Doppler lower arterial ultrasound 02/13/2020     No evidence of significant occlusive arterial disease.  H/O Doppler lower venous ultrasound 08/20/2019     No evidence of significant venous insufficiency noted in the bilateral lower, no DVT,SVT.    H/O exercise stress test 05/31/2018    treadmill    H/O exercise stress test 07/23/2018 7/2018: treadmill, No Ischemia noted pt can start rehab.     History of exercise stress test 06/19/2017    treadmill    History of exercise stress test 06/27/2017    treadmill    History of GI bleed 2010    History of nuclear stress test 08/09/2017    EF42%,mild to moderate inferior ischemia    History of nuclear stress test 10/11/2018    Normal EF 48%, ABN Medium size mild inferior ischemia, no change from 8/2017    Hx of echocardiogram 08/09/2017    normal,EF55-60%    HX OTHER MEDICAL     Foster care until age 14    Hyperlipidemia     Hypertension 2008    Osteoarthritis     Primarily affecting the low back    Prostatic hypertrophy, benign     S/P PTCA (percutaneous transluminal coronary angioplasty) 06/19/2018 6/19/18: Stent to LAD.  5/22/18: Stent to OM     Unspecified cerebral artery occlusion with cerebral infarction 1997    pt states he had a \"mini stroke\"\"I did not know it was  a stroke, but after I had a concussion they told me the scan showed I had had a stroke\"       Past Surgical History:        Procedure Laterality Date    CARDIOVASCULAR STRESS TEST  05/2017    inferior ischemia    CORONARY ANGIOPLASTY WITH STENT PLACEMENT  06/01/2017    60% stenosis    OTHER SURGICAL HISTORY  11/2012    thyroid biopsy--benign    SHOULDER ARTHROSCOPY Right 1992    R shoulder scope partial rot cuff repair       Medications:   Scheduled Meds:   sodium chloride flush  5-40 mL IntraVENous BID    heparin (porcine)  5,000 Units Subcutaneous 3 times per day    aspirin  81 mg Oral Daily    finasteride  5 mg Oral Daily    atorvastatin  40 mg Oral Daily    amLODIPine  10 mg Oral Daily    metoprolol tartrate  50 mg Oral BID    clopidogrel  75 mg Oral Daily    sodium chloride flush  5-40 mL IntraVENous 2 times per day     Continuous Infusions:   sodium chloride      niCARdipine 2.5 mg/hr (08/25/21 1125)     PRN Meds:.sodium chloride flush, sodium chloride, ondansetron **OR** ondansetron, acetaminophen **OR** acetaminophen, polyethylene glycol, nitroGLYCERIN, hydrALAZINE    Allergies:  Patient has no known allergies. Social History:   TOBACCO:   reports that he has been smoking cigars.  He has a 7.50 pack-year smoking history. He has never used smokeless tobacco.  ETOH:   reports previous alcohol use. OCCUPATION:      Family History:       Problem Relation Age of Onset    Coronary Art Dis Father     Hypertension Father     Stroke Father     High Blood Pressure Father     Heart Attack Father 80           [employer] Breast Cancer Mother     Alzheimer's Disease Mother          80    Alcohol Abuse Brother     Cancer Brother         type unknown    Hypertension Brother     Learning Disabilities Brother     Heart Disease Brother     Heart Disease Brother        REVIEW OF SYSTEMS:  Negative except for weak shortness of breath anxious pain. Physical Exam:    I/O: No intake/output data recorded. Vitals: BP (!) 150/112   Pulse 81   Temp 97.6 °F (36.4 °C) (Oral)   Resp 21   Ht 6' (1.829 m)   Wt 250 lb (113.4 kg)   SpO2 96%   BMI 33.91 kg/m²   General appearance: awake weak  HEENT: Head: Normal, normocephalic, atraumatic. Neck: supple, symmetrical, trachea midline  Lungs: diminished breath sounds bilaterally  Heart: S1, S2 normal  Abdomen: abnormal findings:  soft NT  Extremities: edema trace  Neurologic: Mental status: alertness: alert      CBC:   Recent Labs     21  0000   WBC 5.1   HGB 16.9        BMP:    Recent Labs     21  0000      K 4.3      CO2 24   BUN 17   CREATININE 1.9*   GLUCOSE 85     Hepatic:   Recent Labs     21  0000   AST 19   ALT 19   BILITOT 0.3   ALKPHOS 83     Troponin: No results for input(s): TROPONINI in the last 72 hours. BNP: No results for input(s): BNP in the last 72 hours.   Lipids:   Recent Labs     21  0000   CHOL 206*   HDL 33*     ABGs: No results found for: PHART, PO2ART, YOL6RAV  INR:   Recent Labs     21  0000   INR 0.97     Renal Labs  Albumin:    Lab Results   Component Value Date    LABALBU 4.6 2021     Calcium:    Lab Results   Component Value Date    CALCIUM 9.2 2021     Phosphorus:    Lab Results   Component Value Date    PHOS 3.3 05/23/2018     U/A:    Lab Results   Component Value Date    NITRU POSITIVE 08/25/2021    NITRU Negative 09/22/2017    COLORU YELLOW 08/25/2021    PHUR 6.5 08/01/2019    PHUR 6.0 09/22/2017    WBCUA 9 08/25/2021    RBCUA 1 08/25/2021    MUCUS RARE 08/25/2021    TRICHOMONAS NONE SEEN 08/25/2021    BACTERIA NEGATIVE 08/25/2021    CLARITYU CLEAR 08/25/2021    SPECGRAV 1.016 08/25/2021    UROBILINOGEN NEGATIVE 08/25/2021    BILIRUBINUR NEGATIVE 08/25/2021    BILIRUBINUR negative 08/01/2019    BLOODU NEGATIVE 08/25/2021    GLUCOSEU negative 08/01/2019    GLUCOSEU Negative 09/22/2017    KETUA NEGATIVE 08/25/2021     ABG:  No results found for: PHART, SEA9XUQ, PO2ART, GRY6ONW, BEART, THGBART, CJL0SWC, H3MYAHMF  HgBA1c:    Lab Results   Component Value Date    LABA1C 5.1 08/25/2021     Microalbumen/Creatinine ratio:  No components found for: RUCREAT  TSH:    Lab Results   Component Value Date    TSH 1.08 10/22/2014     IRON:  No results found for: IRON  Iron Saturation:  No components found for: PERCENTFE  TIBC:  No results found for: TIBC  FERRITIN:  No results found for: FERRITIN  RPR:  No results found for: RPR  ZACH:  No results found for: ANATITER, ZACH  24 Hour Urine for Creatinine Clearance:  No components found for: CREAT4, UHRS10, UTV10  -----------------------------------------------------------------      Assessment and Recommendations     Patient Active Problem List   Diagnosis Code    Benign prostatic hyperplasia N40.0    Essential hypertension I10    Pure hypercholesterolemia E78.00    Obesity (BMI 30.0-34. 9) E66.9    Coronary artery disease of native artery of native heart with stable angina pectoris (HCC) I25.118    Dyslipidemia E78.5    Depression F32.9    Coronary artery disease involving native coronary artery of native heart with unstable angina pectoris (HCC) I25.110    Osteoarthritis of lumbar spine M47.816    Chronic pain G89.29    S/P PTCA (percutaneous transluminal coronary angioplasty) Z98.61    ASCVD (arteriosclerotic cardiovascular disease) I25.10    Creatinine elevation R79.89    Pain in both lower legs M79.661, M79.662    Claudication (HCC) I73.9    Chest pain R07.9     Impression plan  1. Chest pain with family history of coronary disease  2. Hypertension controlled  3. Shortness of breath  4. CKD 3 versus acute renal failure   #5 anxiety    Plan  1 follow-up cardiology and pulmonary work-up apparently so far negative evaluation will monitor closely with cardiac work-up  2. Blood pressure still increased will adjust medications  3. Oxygenation appears better today will monitor closely  4. Renal function increases a combination of noncompliance of medications and contrast will maintain with gentle hydration adjust medications  5.   Monitor stress and anxiety next #6 renal follow closely  Electronically signed by Pallavi Artis MD on 8/25/2021 at 2:52 PM

## 2021-08-25 NOTE — ED NOTES
The patient has just come back to the ED from his stress test and echocardiogram. He is not taken into trauma room 3, but is taken immediately over to room 3 EC per order of the charge nurse. This nurse takes the patient and gives report to [facility]. The patient is complaining of chest pain since the stress test he sts. He is offered tylenol for the pain per this nurse, which is what is ordered per the cardiologist. He refuses the tylenol at this time.       Jacque Foster RN  08/25/21 [id number]

## 2021-08-25 NOTE — ED PROVIDER NOTES
Chest pain     GERD (gastroesophageal reflux disease)     H/O Doppler lower arterial ultrasound 2020     No evidence of significant occlusive arterial disease.  H/O Doppler lower venous ultrasound 2019     No evidence of significant venous insufficiency noted in the bilateral lower, no DVT,SVT.    H/O exercise stress test 2018    treadmill    H/O exercise stress test 2018: treadmill, No Ischemia noted pt can start rehab.     History of exercise stress test 2017    treadmill    History of exercise stress test 2017    treadmill    History of GI bleed     History of nuclear stress test 2017    EF42%,mild to moderate inferior ischemia    History of nuclear stress test 10/11/2018    Normal EF 48%, ABN Medium size mild inferior ischemia, no change from 2017    Hx of echocardiogram 2017    normal,EF55-60%    HX OTHER MEDICAL     Foster care until age 14    Hyperlipidemia     Hypertension     Osteoarthritis     Primarily affecting the low back    Prostatic hypertrophy, benign     S/P PTCA (percutaneous transluminal coronary angioplasty) 2018: Stent to LAD.  18: Stent to OM     Unspecified cerebral artery occlusion with cerebral infarction     pt states he had a \"mini stroke\"\"I did not know it was  a stroke, but after I had a concussion they told me the scan showed I had had a stroke\"     FAMILY HISTORY  Family History   Problem Relation Age of Onset    Coronary Art Dis Father     Hypertension Father     Stroke Father     High Blood Pressure Father     Heart Attack Father 80           Gloriajean Seeds Breast Cancer Mother     Alzheimer's Disease Mother          80    Alcohol Abuse Brother     Cancer Brother         type unknown    Hypertension Brother     Learning Disabilities Brother     Heart Disease Brother     Heart Disease Brother      SOCIAL HISTORY  Social History     Socioeconomic History    Marital status:      Spouse name: None    Number of children: 3    Years of education: None    Highest education level: None   Occupational History    None   Tobacco Use    Smoking status: Light Tobacco Smoker     Packs/day: 0.25     Years: 30.00     Pack years: 7.50     Types: Cigars     Last attempt to quit: 2017     Years since quittin.6    Smokeless tobacco: Never Used   Vaping Use    Vaping Use: Never used   Substance and Sexual Activity    Alcohol use: Not Currently     Comment: rarely    Drug use: No    Sexual activity: Not Currently   Other Topics Concern    None   Social History Narrative    None     Social Determinants of Health     Financial Resource Strain:     Difficulty of Paying Living Expenses:    Food Insecurity:     Worried About Running Out of Food in the Last Year:     Ran Out of Food in the Last Year:    Transportation Needs:     Lack of Transportation (Medical):      Lack of Transportation (Non-Medical):    Physical Activity:     Days of Exercise per Week:     Minutes of Exercise per Session:    Stress:     Feeling of Stress :    Social Connections:     Frequency of Communication with Friends and Family:     Frequency of Social Gatherings with Friends and Family:     Attends Sikh Services:     Active Member of Clubs or Organizations:     Attends Club or Organization Meetings:     Marital Status:    Intimate Partner Violence:     Fear of Current or Ex-Partner:     Emotionally Abused:     Physically Abused:     Sexually Abused:        SURGICAL HISTORY  Past Surgical History:   Procedure Laterality Date    CARDIOVASCULAR STRESS TEST  2017    inferior ischemia    CORONARY ANGIOPLASTY WITH STENT PLACEMENT  2017    60% stenosis    OTHER SURGICAL HISTORY  2012    thyroid biopsy--benign    SHOULDER ARTHROSCOPY Right     R shoulder scope partial rot cuff repair     CURRENT MEDICATIONS  Previous Medications    AMLODIPINE (NORVASC) 10 MG TABLET Take 1 tablet by mouth daily    ASPIRIN 81 MG TABLET    Take 81 mg by mouth daily. ATORVASTATIN (LIPITOR) 40 MG TABLET    Take 1 tablet by mouth daily    CLOPIDOGREL (PLAVIX) 75 MG TABLET    Take 1 tablet by mouth daily    FINASTERIDE (PROSCAR) 5 MG TABLET    Take 1 tablet by mouth daily    LISINOPRIL (PRINIVIL;ZESTRIL) 20 MG TABLET    Take 1 tablet by mouth daily    METOPROLOL TARTRATE (LOPRESSOR) 50 MG TABLET    Take 1 tablet by mouth 2 times daily Hold if SBP < 100 and call cardiology    MULTIPLE VITAMINS-MINERALS (MENS MULTIPLE VITAMIN/LYCOPENE) TABS    Take  by mouth. NITROGLYCERIN (NITROSTAT) 0.4 MG SL TABLET    up to max of 3 total doses. If no relief after 1 dose, call 911. ALLERGIES  No Known Allergies    Nursing notes reviewed by myself for past medical history, family history, social history, surgical history, current medications, and allergies. PHYSICAL EXAM  VITAL SIGNS: Triage VS:    ED Triage Vitals [08/24/21 2326]   Enc Vitals Group      BP (!) 221/135      Pulse 78      Resp 16      Temp 98 °F (36.7 °C)      Temp Source Oral      SpO2 97 %      Weight       Height       Head Circumference       Peak Flow       Pain Score       Pain Loc       Pain Edu? Excl. in [address]? Constitutional: Well developed, Well nourished, nontoxic appearing  HENT: Normocephalic, Atraumatic, Bilateral external ears normal, Oropharynx moist, No oral exudates, Nose normal.   Eyes: PERRL, EOMI, Conjunctiva normal, No discharge. No scleral icterus. Neck: Normal range of motion, No tenderness, Supple. Lymphatic: No lymphadenopathy noted. Cardiovascular: Normal heart rate, Normal rhythm, No murmurs, gallops or rubs. Thorax & Lungs: Normal breath sounds, No respiratory distress, No wheezing. Reproducible tenderness palpation of left anterior chest  Abdomen: Soft, No tenderness, No masses, No pulsatile masses, No distention, Normal bowel sounds  Skin: Warm, Dry, Pink, No mottling, No erythema, No rash. Back: No tenderness, No CVA tenderness. Extremities: No edema, No tenderness, No cyanosis, Normal perfusion, No clubbing. Musculoskeletal: Good range of motion in all major joints as observed. No major deformities noted. Neurologic: Alert & oriented x 3, Normal motor function, Normal sensory function, CN II-XII grossly intact as tested, No focal deficits noted. Psychiatric: Affect normal, Judgment normal, Mood normal.   EKG  Per my interpretation demonstrates normal sinus rhythm at a rate of 66 bpm.  Normal axis. Normal intervals. No acute ST segment changes. RADIOLOGY  Labs Reviewed   CBC WITH AUTO DIFFERENTIAL - Abnormal; Notable for the following components:       Result Value    Monocytes % 13.2 (*)     Eosinophils % 4.1 (*)     All other components within normal limits   COMPREHENSIVE METABOLIC PANEL - Abnormal; Notable for the following components:    CREATININE 1.9 (*)     GFR Non- 36 (*)     GFR  44 (*)     All other components within normal limits   D-DIMER, QUANTITATIVE - Abnormal; Notable for the following components:    D-Dimer, Quant 447 (*)     All other components within normal limits   TROPONIN   APTT   PROTIME-INR   LIPASE   TROPONIN   URINALYSIS WITH MICROSCOPIC   SODIUM, URINE, RANDOM   CREATININE, RANDOM URINE     I personally reviewed the images. The radiologist's interpretation reveals:  Last Imaging results   CTA PULMONARY W CONTRAST   Final Result   No evidence of pulmonary embolism or acute pulmonary abnormality. XR CHEST (2 VW)   Final Result   No acute process.            Procedures  NA  MEDS GIVEN IN ED:  Medications   sodium chloride flush 0.9 % injection 5-40 mL (10 mLs IntraVENous Given 8/25/21 0223)   nitroGLYCERIN (NITROSTAT) SL tablet 0.4 mg (0.4 mg Sublingual Given 8/25/21 0503)   heparin (porcine) injection 5,000 Units (has no administration in time range)   nitroGLYCERIN (NITROSTAT) SL tablet 0.4 mg (0.4 mg Sublingual Given 8/25/21 0220)   acetaminophen (TYLENOL) tablet 1,000 mg (1,000 mg Oral Given 8/25/21 0220)   0.9 % sodium chloride bolus (0 mLs IntraVENous Stopped 8/25/21 0357)   iopamidol (ISOVUE-370) 76 % injection 100 mL (100 mLs IntraVENous Given 8/25/21 0237)   lisinopril (PRINIVIL;ZESTRIL) tablet 20 mg (20 mg Oral Given 8/25/21 0400)   amLODIPine (NORVASC) tablet 10 mg (10 mg Oral Given 8/25/21 0400)     COURSE & MEDICAL DECISION MAKING  59-year-old male presents emergency department complaints of chest pain throughout the day today. Does have history of coronary artery disease. Initial vital signs remarkable for significant elevated blood pressure of 221/135. He is nontoxic-appearing on exam.  Lungs are clear to auscultation bilaterally. At this time we'll obtain CBC, BMP, EKG, troponin, hepatic panel, lipase, chest x-ray, and D-dimer level. Nitroglycerin and Tylenol ordered for the patient. EKG is without acute ischemic changes. Troponin is nonelevated. BMP demonstrates creatinine 1.9 which is baseline for the patient. D-dimer was found to be elevated at 447 and therefore CT of the chest was obtained and is without evidence of pulmonary embolism. Following nitroglycerin the patient had improvement of pain to a 3/10. However, given his risk factors with continued pain and improvement with nitroglycerin I do feel he will benefit from hospitalization for unstable angina type symptoms. Patient was in agreement with this plan. I did provide the patient with a dose of his amlodipine and lisinopril which she has not been compliant with over the last several months. Discussed the case with the Dr. Mike Padilla of the hospitalist team.  Patient will be hospitalized for further management. Appropriate PPE utilized as indicated for entire patient encounter? Time of Disposition: See timeline  ? New Prescriptions    No medications on file     FINAL IMPRESSION  1. Unstable angina ([address]    2.  Uncontrolled hypertension Electronically signed by:  Ramona Sherman DO, 8/25/2021        Ramona Sherman DO  08/25/21 8753

## 2021-08-25 NOTE — CONSULTS
CARDIOLOGY CONSULT NOTE   Reason for consultation:  Chest Pain     Referring physician:  Danielle Brothers DO     Primary care physician: Jose Seo MD      Dear  Dr. Danielle Brothers DO   Thanks for the consult. Chief Complaints :  Chief Complaint   Patient presents with    Chest Pain     radiating to left arm        History of present illness:Valentino is a 57 y. o.year old who presents with complains of chest pressure and pain in the middle of the chest which is persistent 5 out of 10 radiating to left arm ongoing since yesterday. He has not been taking any of his cardiac medication for last several months he had moved to [facility] after coming back he was out of meds. Blood pressures been running up to 200s  His creatinine is also elevated to 1.9 on presentation  Known to me from outpatient cardiology service  History of multiple stents and noncompliance PCI for in-stent restenosis of the right coronary artery and probe of the PDA in May 2018 he has had RCA intervention in 2017 LAD intervention in 2018 and reintervention of RCA for in-stent stenosis in 2018. Past medical history:    has a past medical history of Blood transfusion, CAD (coronary artery disease), Chest pain, GERD (gastroesophageal reflux disease), H/O Doppler lower arterial ultrasound, H/O Doppler lower venous ultrasound, H/O exercise stress test, H/O exercise stress test, History of exercise stress test, History of exercise stress test, History of GI bleed, History of nuclear stress test, History of nuclear stress test, Hx of echocardiogram, HX OTHER MEDICAL, Hyperlipidemia, Hypertension, Osteoarthritis, Prostatic hypertrophy, benign, S/P PTCA (percutaneous transluminal coronary angioplasty), and Unspecified cerebral artery occlusion with cerebral infarction. Past surgical history:   has a past surgical history that includes Shoulder arthroscopy (Right, 1992); other surgical history (11/2012);  Coronary angioplasty with stent (06/01/2017); and cardiovascular stress test (05/2017). Social History:   reports that he has been smoking cigars. He has a 7.50 pack-year smoking history. He has never used smokeless tobacco. He reports previous alcohol use. He reports that he does not use drugs.   Family history:   no family history of CAD, STROKE of DM at early age    No Known Allergies    sodium chloride flush 0.9 % injection 5-40 mL, BID  heparin (porcine) injection 5,000 Units, 3 times per day  aspirin chewable tablet 81 mg, Daily  finasteride (PROSCAR) tablet 5 mg, Daily  atorvastatin (LIPITOR) tablet 40 mg, Daily  amLODIPine (NORVASC) tablet 10 mg, Daily  metoprolol tartrate (LOPRESSOR) tablet 50 mg, BID  clopidogrel (PLAVIX) tablet 75 mg, Daily  sodium chloride flush 0.9 % injection 5-40 mL, 2 times per day  sodium chloride flush 0.9 % injection 5-40 mL, PRN  0.9 % sodium chloride infusion, PRN  ondansetron (ZOFRAN-ODT) disintegrating tablet 4 mg, Q8H PRN   Or  ondansetron (ZOFRAN) injection 4 mg, Q6H PRN  acetaminophen (TYLENOL) tablet 650 mg, Q6H PRN   Or  acetaminophen (TYLENOL) suppository 650 mg, Q6H PRN  polyethylene glycol (GLYCOLAX) packet 17 g, Daily PRN  nitroGLYCERIN (NITROSTAT) SL tablet 0.4 mg, Q5 Min PRN  niCARdipine (CARDENE) 25 mg in dextrose 5 % 250 mL infusion, Continuous      Current Facility-Administered Medications   Medication Dose Route Frequency Provider Last Rate Last Admin    sodium chloride flush 0.9 % injection 5-40 mL  5-40 mL IntraVENous BID [facility], DO   10 mL at 08/25/21 0223    heparin (porcine) injection 5,000 Units  5,000 Units Subcutaneous 3 times per day [facility], DO        aspirin chewable tablet 81 mg  81 mg Oral Daily [facility], DO        finasteride (PROSCAR) tablet 5 mg  5 mg Oral Daily [facility], DO        atorvastatin (LIPITOR) tablet 40 mg  40 mg Oral Daily [facility], DO        amLODIPine (NORVASC) tablet 10 mg  10 mg Oral Daily [facility], DO        metoprolol tartrate (LOPRESSOR) tablet 50 mg  50 mg Oral BID Rella Bars, DO        clopidogrel (PLAVIX) tablet 75 mg  75 mg Oral Daily Rella Bars, DO        sodium chloride flush 0.9 % injection 5-40 mL  5-40 mL IntraVENous 2 times per day Rella Bars, DO        sodium chloride flush 0.9 % injection 5-40 mL  5-40 mL IntraVENous PRN Rella Bars, DO        0.9 % sodium chloride infusion  25 mL IntraVENous PRN Rella Bars, DO        ondansetron (ZOFRAN-ODT) disintegrating tablet 4 mg  4 mg Oral Q8H PRN Rella Bars, DO        Or    ondansetron (ZOFRAN) injection 4 mg  4 mg IntraVENous Q6H PRN Rella Bars, DO        acetaminophen (TYLENOL) tablet 650 mg  650 mg Oral Q6H PRN Rella Bars, DO        Or    acetaminophen (TYLENOL) suppository 650 mg  650 mg Rectal Q6H PRN Rella Bars, DO        polyethylene glycol (GLYCOLAX) packet 17 g  17 g Oral Daily PRN Rella Bars, DO        nitroGLYCERIN (NITROSTAT) SL tablet 0.4 mg  0.4 mg Sublingual Q5 Min PRN Rella Bars, DO        niCARdipine (CARDENE) 25 mg in dextrose 5 % 250 mL infusion  3-15 mg/hr IntraVENous Continuous Shan Pedraza MD         Current Outpatient Medications   Medication Sig Dispense Refill    atorvastatin (LIPITOR) 40 MG tablet Take 1 tablet by mouth daily 30 tablet 11    lisinopril (PRINIVIL;ZESTRIL) 20 MG tablet Take 1 tablet by mouth daily 30 tablet 2    amLODIPine (NORVASC) 10 MG tablet Take 1 tablet by mouth daily 30 tablet 2    metoprolol tartrate (LOPRESSOR) 50 MG tablet Take 1 tablet by mouth 2 times daily Hold if SBP < 100 and call cardiology 60 tablet 5    clopidogrel (PLAVIX) 75 MG tablet Take 1 tablet by mouth daily 90 tablet 3    nitroGLYCERIN (NITROSTAT) 0.4 MG SL tablet up to max of 3 total doses.  If no relief after 1 dose, call 911. 25 tablet 3    finasteride (PROSCAR) 5 MG tablet Take 1 tablet by mouth daily 30 tablet 5    Multiple Vitamins-Minerals (MENS MULTIPLE VITAMIN/LYCOPENE) TABS Take  by mouth.  aspirin 81 MG tablet Take 81 mg by mouth daily. Review of Systems:   · Constitutional: No Fever or Weight Loss   · Eyes: No Decreased Vision  · ENT: No Headaches, Hearing Loss or Vertigo  · Cardiovascular: As per HPI  · Respiratory: As per HPI  · Gastrointestinal: No abdominal pain, appetite loss, blood in stools, constipation, diarrhea or heartburn  · Genitourinary: No dysuria, trouble voiding, or hematuria  · Musculoskeletal:  No gait disturbance, weakness or joint complaints  · Integumentary: No rash or pruritis  · Neurological: No TIA or stroke symptoms  · Psychiatric: No anxiety or depression  · Endocrine: No malaise, fatigue or temperature intolerance  · Hematologic/Lymphatic: No bleeding problems, blood clots or swollen lymph nodes  · Allergic/Immunologic: No nasal congestion or hives  All systems negative except as marked. Physical Examination:    Vitals:    08/25/21 0500 08/25/21 0530 08/25/21 0600 08/25/21 0642   BP: (!) 204/129 (!) 183/118 (!) 172/121 (!) 191/117   Pulse:   54    Resp:       Temp:       TempSrc:       SpO2: 99%  97%        General Appearance:  No distress, conversant    Constitutional:  Well developed, Well nourished, No acute distress, Non-toxic appearance. HENT:  Normocephalic, Atraumatic, Bilateral external ears normal, Oropharynx moist, No oral exudates, Nose normal. Neck- Normal range of motion, No tenderness, Supple, No stridor,no apical-carotid delay  Lymphatics : no palpable lymph nodes  Eyes:  PERRL, EOMI, Conjunctiva normal, No discharge. Respiratory:  Normal breath sounds, No respiratory distress, No wheezing, No chest tenderness. ,no use of accessory muscles, crackles Present  Cardiovascular: (PMI) apex non displaced,no lifts no thrills, ankle swelling Present , 1+, s1 and s2 audible,Murmur. Present, JVD not noted    Abdomen /GI:  Bowel sounds normal, Soft, No tenderness, No masses, No gross visceromegaly   :  No costovertebral angle ORDERING SYSTEM PROVIDED HISTORY: Evaluate for pulmonary embolism TECHNOLOGIST PROVIDED HISTORY: Reason for exam:->Evaluate for pulmonary embolism Decision Support Exception - unselect if not a suspected or confirmed emergency medical condition->Emergency Medical Condition (MA) Reason for Exam: dora d-dimer/sob/cp FINDINGS: Pulmonary Arteries: Pulmonary arteries are adequately opacified for evaluation. No evidence of intraluminal filling defect to suggest pulmonary embolism. Main pulmonary artery is normal in caliber. Mediastinum: No evidence of mediastinal lymphadenopathy. The heart and pericardium demonstrate no acute abnormality. There is no acute abnormality of the thoracic aorta. Lungs/pleura: The lungs are without acute process. No focal consolidation or pulmonary edema. No evidence of pleural effusion or pneumothorax. Upper Abdomen: Limited images of the upper abdomen are unremarkable. Soft Tissues/Bones: The thyroid gland is enlarged and heterogeneous. No acute bone or soft tissue abnormality. No evidence of pulmonary embolism or acute pulmonary abnormality. All labs, medications and tests reviewed by myself including data  from outside source , patient and available family . Continue all other medications of all above medical condition listed as is. Impression:  Principal Problem:    Chest pain  Active Problems:    Pure hypercholesterolemia    Coronary artery disease involving native coronary artery of native heart with unstable angina pectoris (HCC)    ASCVD (arteriosclerotic cardiovascular disease)    S/P PTCA (percutaneous transluminal coronary angioplasty)    Claudication (HCC)  Resolved Problems:    * No resolved hospital problems. *      Assessment: 57 y. o.year old with PMH of  has a past medical history of Blood transfusion, CAD (coronary artery disease), Chest pain, GERD (gastroesophageal reflux disease), H/O Doppler lower arterial ultrasound, H/O Doppler lower venous ultrasound, H/O exercise stress test, H/O exercise stress test, History of exercise stress test, History of exercise stress test, History of GI bleed, History of nuclear stress test, History of nuclear stress test, Hx of echocardiogram, HX OTHER MEDICAL, Hyperlipidemia, Hypertension, Osteoarthritis, Prostatic hypertrophy, benign, S/P PTCA (percutaneous transluminal coronary angioplasty), and Unspecified cerebral artery occlusion with cerebral infarction. Plan and Recommendations:    Severe chest pain in setting of uncontrolled hypertension blood pressure 230s need to control blood pressure for started a Cardene drip give IV hydralazine as needed. We will get stress test once blood pressure is better controlled  Chest Pain: serial cardiac enzymes, EKG reviewed, further plan as per enzymes and clinical course  Restart home medication including blood pressure medication has been noncompliant  Renal failure as per primary team nephrology  DVT prophylaxis if no contraindication  6. Dyslipidemia: continue statins           Thank you  much for consult and giving us the opportunity in contributing in the care of this patient. Please feel free to call me for any questions.        Deep Meier MD, 8/25/2021 6:53 AM

## 2021-08-25 NOTE — PROGRESS NOTES
Hospitalist Progress Note      Name:  Daisy Man /Age/Sex: 1960  (89 y.o. male)   MRN & CSN:  [id number] & [id number] Admission Date/Time: 2021  2:00 AM   Location:  [facility] PCP: Ricci Hanson MD         Hospital Day: 1    Assessment and Plan:   Daisy Man is a 57 y.o.  male  who presents with Chest pain      Assessment and plan:  Chest pain with history of hypertension, CAD s/p PTCA, multiple stents and  intervention for in-stent restenosis, 2017, 2018 . Troponin negative x3  EKG normal sinus rhythm, HR 66, QTc 417  Echo 2021: EF 50-55%  Stress test 2021: Negative for ischemia, EF 53%     Hypertensive urgency  Initial blood pressure 204/129, and Cardene drip started in the ED    RENATO on CKD stage III:  · CR 1.9 the ED with baseline CR 1.5  ·   Elevated D-dimer: D-dimer 447, CTA showed no PE  Nicotine abuse  Non Compliance with medication    Chronic conditions  · HLD: , HDL 33,  on 2021, continue statin  · BPH continue Proscar  · Anxiety  · Obesity: BMI 33.91 kg/m2, lifestyle and dietary modifications dietary modifications    Plan:  · Inpatient nephrology consulted: Appreciate recommendation  · Gentle hydration of 0.9 normal sinus 70 cc/h  ·  started on amlodipine, Lopressor 50 mg twice daily and Catapres  · Hydralazine as needed for SBP more than 160  · Appreciate Cardiology recommendation  · Continue NTG for chest pain  · CBC and BMP daily  ·   Diet: ADULT DIET; Regular; 3 carb choices (45 gm/meal)  DVT prophylaxis: Heparin  GI prophylaxis  CODE STATUS:Full Code    Treatment and discharge plan discussed with patient/family. Patient/family voiced understanding. This patient was seen examined and treatment plan discussed with/supervising physician. History of Present Illness:     Chief Complaint: Chest pain    Subjective: Patient Seen and examined. C/o midsternal chest pain,  BP elevated currently on a Cardene drip.   He was not taking his blood pressure medication for couple of months. Review of Systems   Constitutional: Negative for chills and fever. Respiratory: Negative for chest tightness and shortness of breath. Cardiovascular: Positive for chest pain. Negative for palpitations. Gastrointestinal: Negative for abdominal pain, nausea and vomiting. Genitourinary: Negative for dysuria and hematuria. Neurological: Negative for dizziness and weakness. Psychiatric/Behavioral: The patient is nervous/anxious. ROS reviewed negative, unless as noted above    Objective:   No intake or output data in the 24 hours ending 08/25/21 1813   Vitals:   Vitals:    08/25/21 1647   BP: (!) 165/107   Pulse: 82   Resp: 20   Temp: 98   SpO2: 93%     Physical Exam:   Physical Exam  Constitutional:       Appearance: Normal appearance. HENT:      Head: Normocephalic and atraumatic. Eyes:      Extraocular Movements: Extraocular movements intact. Conjunctiva/sclera: Conjunctivae normal.   Cardiovascular:      Rate and Rhythm: Normal rate and regular rhythm. Pulses: Normal pulses. Heart sounds: Normal heart sounds. No murmur heard. No friction rub. No gallop. Pulmonary:      Effort: Pulmonary effort is normal.      Breath sounds: Normal breath sounds. No wheezing, rhonchi or rales. Abdominal:      General: Abdomen is flat. Bowel sounds are normal.      Palpations: Abdomen is soft. Musculoskeletal:         General: Normal range of motion. Right lower leg: No edema. Left lower leg: No edema. Skin:     General: Skin is warm and dry. Neurological:      General: No focal deficit present. Mental Status: He is alert and oriented to person, place, and time.    Psychiatric:         Mood and Affect: Mood normal.       Medications:   Medications:    sodium chloride flush  5-40 mL IntraVENous BID    heparin (porcine)  5,000 Units Subcutaneous 3 times per day    aspirin  81 mg Oral Daily    finasteride  5 mg Oral Daily    atorvastatin 75  Septum Systolic: 6.90 cm CP/23 A3ZH ESV/LV ESV   LA/Aorta: 0.97  CO: 5.74 l/min           Index: 36 ml/15 m2  CI: 2.45 l/m*m2          EF Calculated (A4C): 52 LA volume/Index: 29 ml                           %                       /52I4  LV Area Diastolic: 81.9  EF Calculated (2D):  cm2                      71.2 %  LV Area Systolic: 14.8  cm2                      LV Length: 7.48 cm                            LVOT: 2.1 cm  Doppler Measurements & Calculations   MV Peak E-Wave: 58.2    AV Peak Velocity: 150 cm/s   LVOT Peak Velocity: 102  cm/s                    AV Peak Gradient: 9 mmHg     cm/s  MV Peak A-Wave: 80 cm/s AV Mean Velocity: 116 cm/s   LVOT Mean Velocity:  MV E/A Ratio: 0.73      AV Mean Gradient: 6 mmHg     69.4 cm/s  MV Peak Gradient: 1.35  AV VTI: 23.5 cm              LVOT Peak Gradient: 4  mmHg                    AV Area (Continuity):2.52    mmHgLVOT Mean Gradient:                          cm2                          2 mmHg  MV P1/2t: 42 msec  MVA by PHT:5.24 cm2     LVOT VTI: 17.1 cm   MV E' Septal Velocity:  6.03 cm/s  MV E' Lateral Velocity:  7.24 cm/s  MV E/E' septal: 9.65  MV E/E' lateral: 8.04      XR CHEST (2 VW)    Result Date: 8/25/2021  EXAMINATION: TWO XRAY VIEWS OF THE CHEST 8/24/2021 11:41 pm COMPARISON: None. HISTORY: ORDERING SYSTEM PROVIDED HISTORY: chest pain TECHNOLOGIST PROVIDED HISTORY: Reason for exam:->chest pain Reason for Exam: chest pain Acuity: Acute Type of Exam: Initial FINDINGS: The lungs are adequately inflated. There is no focal consolidation, pleural effusion or pneumothorax. The heart and mediastinal contours are within normal limits. No acute bony findings are identified. No acute process. CTA PULMONARY W CONTRAST    Result Date: 8/25/2021  EXAMINATION: CTA OF THE CHEST 8/25/2021 2:37 am TECHNIQUE: CTA of the chest was performed after the administration of intravenous contrast.  Multiplanar reformatted images are provided for review.   MIP images are provided for review. Dose modulation, iterative reconstruction, and/or weight based adjustment of the mA/kV was utilized to reduce the radiation dose to as low as reasonably achievable. COMPARISON: None. HISTORY: ORDERING SYSTEM PROVIDED HISTORY: Evaluate for pulmonary embolism TECHNOLOGIST PROVIDED HISTORY: Reason for exam:->Evaluate for pulmonary embolism Decision Support Exception - unselect if not a suspected or confirmed emergency medical condition->Emergency Medical Condition (MA) Reason for Exam: dora d-dimer/sob/cp FINDINGS: Pulmonary Arteries: Pulmonary arteries are adequately opacified for evaluation. No evidence of intraluminal filling defect to suggest pulmonary embolism. Main pulmonary artery is normal in caliber. Mediastinum: No evidence of mediastinal lymphadenopathy. The heart and pericardium demonstrate no acute abnormality. There is no acute abnormality of the thoracic aorta. Lungs/pleura: The lungs are without acute process. No focal consolidation or pulmonary edema. No evidence of pleural effusion or pneumothorax. Upper Abdomen: Limited images of the upper abdomen are unremarkable. Soft Tissues/Bones: The thyroid gland is enlarged and heterogeneous. No acute bone or soft tissue abnormality. No evidence of pulmonary embolism or acute pulmonary abnormality.      NM MYOCARDIAL SPECT REST EXERCISE OR RX    Result Date: 8/25/2021  Cardiac Perfusion Imaging   Demographics   Patient Name      Melissa Hooper     Date of study        08/25/2021   Date of Birth     1960         Gender               Male   Age               57 year(s)         Race                 Black   Patient Number    [id number]         Room Number          [id number]   Visit Number      [id number]          Height               72 inches   Corporate ID      [id number]           Weight               250 pounds   Accession Number  [id number]                                        NM Technologist      [address] [facility]   Ordering Allie Matthews  Physician         MD                 Cardiologist         MD   Conclusions   Summary  ECG portion of stress test is negative for ischemia by diagnostic criteria. Normal EF 53 % with normal ventricular contractility. Decreased uptake  inferiorly due to diaphragmatic artifact. Normal stress myocardial perfusion. This is a normal study. Signatures   ------------------------------------------------------------------  Electronically signed by Karol Matthew MD (Interpreting  cardiologist) on 08/25/2021 at 14:46  ------------------------------------------------------------------  Procedure Procedure Type:   Nuclear Stress Test:Pharmacological, Myocardial Perfusion Imaging with  Pharm, NM MYOCARDIAL SPECT REST EXERCISE OR RX  Indications: CAD and Chest pain. Risk Factors   The patient risk factors include:prior PCI;cerebrovascular disease, Current  - Frequency unknown tobacco use, hypercholesterolemia, hypertension and  family history of premature CAD. Stress Protocols   Resting ECG  Normal sinus rhythm. Resting HR:82 bpm  Resting BP:203/113 mmHg  Stress Protocol:Pharmacologic - Lexiscan  Peak HR:98 bpm                               HR/BP product:19894  Peak BP:203/113 mmHg  Predicted HR: 158 bpm  % of predicted HR: 62   Exercise duration: 01:00 min  Reason for termination:Completed   ECG Findings  Normal sinus rhythm. Arrhythmias  No rhythm abnormality. Symptoms  Mild dyspnea . Symptoms resolved with rest.   Stress Interpretation  ECG portion of stress test is negative for ischemia by diagnostic criteria. Procedure Medications   - Lexiscan I.V. bolus (over 15sec.) 0.4 mg admininstered @ 08/25/2020 08:25. Imaging Protocols   Rest                             Stress   Isotope:Sestamibi 99mTc          Isotope: Sestamibi 99mTc  Isotope dose:10.1 mCi            Isotope dose:33 mCi  Administration route: I.V.        Administration route: I.V. Injection Date:08/25/2020 07:15  Injection Date:08/25/2020 08:25  Scan Date:08/25/2020 08:15       Scan Date:08/25/2020 09:25   Technique:        SPECT          Technique:        Gated                                                     SPECT   Procedure Description   Upon patient arrival, the patient is identified using two identifiers and  the physician order is verified. An IV is established and 8-11mCi of 99mTc  Sestamibi is intravenously injected and followed with 10mL 0.9% Normal  Saline flush. A circulation period of 45 minutes occurs prior to resting  SPECT imaging. After imaging is complete the patient is escorted to the  stress lab. The patient is connected to the ECG and blood pressure is  measured. The RN starts the stress portion of the exam and rapidly  intravenously injects Lexiscan (regadenosine) 0.4mg over a period of 10 to15  seconds and follows with 5mL 0.9% Normal Saline flush. Immediately following  the Nuclear Technologist intravenously injects 22-33mCi of 99mTc Sestamibi  and 5mL 0.9% Normal Saline flush. After completion, recovery, and removal of  the IV, the patient rests during the second circulation period of 45  minutes. Final stress SPECT gated imaging is performed. The patient may  return home or to their room after stress imaging. The images are processed  and final charting is completed and sent to the appropriate cardiologist for  interpretation and reporting. Perfusion Interpretation   Normal EF 53 % with normal ventricular contractility. Decreased uptake  inferiorly due to diaphragmatic artifact. Imaging Results    Summed scores     - Summed stress score: 5     - Summed rest score: 0     - Summed difference score:    5   Rest ejection  Ejection fraction:53 %  EDV :131 ml  ESV :61 ml  Stroke volume :70 ml  Medical History   Accession#:  [id number]  Admission Data Admission date: 08/25/2021 Admission Time: 02:00 Hospital Status: Inpatient.       Electronically signed by Claudia Meadows Arturo Santillan on 8/25/2021 at 6:13 PM

## 2021-08-25 NOTE — ED NOTES
Per Dr. Maximiliano Marcial, verbal orders for one time 10mg hydralazine IV push and to start a cardene gtt and to titrated to SBP under Tiffqujayy Otero, MATILDA  08/25/21 1203

## 2021-08-26 VITALS
DIASTOLIC BLOOD PRESSURE: 81 MMHG | TEMPERATURE: 97.6 F | RESPIRATION RATE: 16 BRPM | HEART RATE: 57 BPM | WEIGHT: 250 LBS | BODY MASS INDEX: 33.86 KG/M2 | OXYGEN SATURATION: 98 % | SYSTOLIC BLOOD PRESSURE: 126 MMHG | HEIGHT: 72 IN

## 2021-08-26 LAB
ANION GAP SERPL CALCULATED.3IONS-SCNC: 11 MMOL/L (ref 4–16)
BUN BLDV-MCNC: 14 MG/DL (ref 6–23)
CALCIUM SERPL-MCNC: 9.3 MG/DL (ref 8.3–10.6)
CHLORIDE BLD-SCNC: 101 MMOL/L (ref 99–110)
CO2: 24 MMOL/L (ref 21–32)
CREAT SERPL-MCNC: 1.7 MG/DL (ref 0.9–1.3)
EKG ATRIAL RATE: 89 BPM
EKG DIAGNOSIS: NORMAL
EKG P AXIS: 64 DEGREES
EKG P-R INTERVAL: 138 MS
EKG Q-T INTERVAL: 372 MS
EKG QRS DURATION: 92 MS
EKG QTC CALCULATION (BAZETT): 452 MS
EKG R AXIS: -1 DEGREES
EKG T AXIS: 32 DEGREES
EKG VENTRICULAR RATE: 89 BPM
GFR AFRICAN AMERICAN: 50 ML/MIN/1.73M2
GFR NON-AFRICAN AMERICAN: 41 ML/MIN/1.73M2
GLUCOSE BLD-MCNC: 90 MG/DL (ref 70–99)
HCT VFR BLD CALC: 51 % (ref 42–52)
HEMOGLOBIN: 17.2 GM/DL (ref 13.5–18)
MCH RBC QN AUTO: 30.2 PG (ref 27–31)
MCHC RBC AUTO-ENTMCNC: 33.7 % (ref 32–36)
MCV RBC AUTO: 89.5 FL (ref 78–100)
PDW BLD-RTO: 14.5 % (ref 11.7–14.9)
PLATELET # BLD: 231 K/CU MM (ref 140–440)
PMV BLD AUTO: 9.8 FL (ref 7.5–11.1)
POTASSIUM SERPL-SCNC: 4.1 MMOL/L (ref 3.5–5.1)
RBC # BLD: 5.7 M/CU MM (ref 4.6–6.2)
SODIUM BLD-SCNC: 136 MMOL/L (ref 135–145)
WBC # BLD: 7.3 K/CU MM (ref 4–10.5)

## 2021-08-26 PROCEDURE — 93010 ELECTROCARDIOGRAM REPORT: CPT | Performed by: INTERNAL MEDICINE

## 2021-08-26 PROCEDURE — 36415 COLL VENOUS BLD VENIPUNCTURE: CPT

## 2021-08-26 PROCEDURE — 94761 N-INVAS EAR/PLS OXIMETRY MLT: CPT

## 2021-08-26 PROCEDURE — 85027 COMPLETE CBC AUTOMATED: CPT

## 2021-08-26 PROCEDURE — 99233 SBSQ HOSP IP/OBS HIGH 50: CPT | Performed by: INTERNAL MEDICINE

## 2021-08-26 PROCEDURE — 6370000000 HC RX 637 (ALT 250 FOR IP): Performed by: STUDENT IN AN ORGANIZED HEALTH CARE EDUCATION/TRAINING PROGRAM

## 2021-08-26 PROCEDURE — APPSS60 APP SPLIT SHARED TIME 46-60 MINUTES: Performed by: NURSE PRACTITIONER

## 2021-08-26 PROCEDURE — 80048 BASIC METABOLIC PNL TOTAL CA: CPT

## 2021-08-26 PROCEDURE — 6370000000 HC RX 637 (ALT 250 FOR IP): Performed by: INTERNAL MEDICINE

## 2021-08-26 PROCEDURE — 6360000002 HC RX W HCPCS: Performed by: STUDENT IN AN ORGANIZED HEALTH CARE EDUCATION/TRAINING PROGRAM

## 2021-08-26 RX ORDER — NITROGLYCERIN 0.4 MG/1
TABLET SUBLINGUAL
Qty: 25 TABLET | Refills: 0 | Status: SHIPPED | OUTPATIENT
Start: 2021-08-26 | End: 2022-09-26 | Stop reason: SDUPTHER

## 2021-08-26 RX ORDER — CLOPIDOGREL BISULFATE 75 MG/1
75 TABLET ORAL DAILY
Qty: 30 TABLET | Refills: 0 | Status: SHIPPED | OUTPATIENT
Start: 2021-08-26 | End: 2021-08-26 | Stop reason: SDUPTHER

## 2021-08-26 RX ORDER — FINASTERIDE 5 MG/1
5 TABLET, FILM COATED ORAL DAILY
Qty: 30 TABLET | Refills: 0 | Status: SHIPPED | OUTPATIENT
Start: 2021-08-26 | End: 2021-10-06 | Stop reason: SDUPTHER

## 2021-08-26 RX ORDER — AMLODIPINE BESYLATE 10 MG/1
10 TABLET ORAL DAILY
Qty: 30 TABLET | Refills: 0 | Status: SHIPPED | OUTPATIENT
Start: 2021-08-26 | End: 2021-08-26 | Stop reason: SDUPTHER

## 2021-08-26 RX ORDER — CLONIDINE HYDROCHLORIDE 0.1 MG/1
0.1 TABLET ORAL 2 TIMES DAILY
Qty: 60 TABLET | Refills: 0 | Status: ON HOLD | OUTPATIENT
Start: 2021-08-26 | End: 2022-07-20 | Stop reason: HOSPADM

## 2021-08-26 RX ORDER — ATORVASTATIN CALCIUM 40 MG/1
40 TABLET, FILM COATED ORAL DAILY
Qty: 30 TABLET | Refills: 0 | Status: SHIPPED | OUTPATIENT
Start: 2021-08-26 | End: 2021-08-26 | Stop reason: SDUPTHER

## 2021-08-26 RX ORDER — METOPROLOL TARTRATE 50 MG/1
50 TABLET, FILM COATED ORAL 2 TIMES DAILY
Qty: 60 TABLET | Refills: 0 | Status: SHIPPED | OUTPATIENT
Start: 2021-08-26 | End: 2021-10-06 | Stop reason: SDUPTHER

## 2021-08-26 RX ORDER — FINASTERIDE 5 MG/1
5 TABLET, FILM COATED ORAL DAILY
Qty: 30 TABLET | Refills: 0 | Status: CANCELLED | OUTPATIENT
Start: 2021-08-26

## 2021-08-26 RX ORDER — METOPROLOL TARTRATE 50 MG/1
50 TABLET, FILM COATED ORAL 2 TIMES DAILY
Qty: 60 TABLET | Refills: 0 | Status: SHIPPED | OUTPATIENT
Start: 2021-08-26 | End: 2021-08-26 | Stop reason: SDUPTHER

## 2021-08-26 RX ORDER — ATORVASTATIN CALCIUM 40 MG/1
40 TABLET, FILM COATED ORAL DAILY
Qty: 30 TABLET | Refills: 0 | Status: SHIPPED | OUTPATIENT
Start: 2021-08-26 | End: 2021-10-06 | Stop reason: SDUPTHER

## 2021-08-26 RX ORDER — AMLODIPINE BESYLATE 10 MG/1
10 TABLET ORAL DAILY
Qty: 30 TABLET | Refills: 0 | Status: SHIPPED | OUTPATIENT
Start: 2021-08-26 | End: 2021-10-06 | Stop reason: SDUPTHER

## 2021-08-26 RX ORDER — NITROGLYCERIN 0.4 MG/1
TABLET SUBLINGUAL
Qty: 25 TABLET | Refills: 0 | Status: SHIPPED | OUTPATIENT
Start: 2021-08-26 | End: 2021-08-26 | Stop reason: SDUPTHER

## 2021-08-26 RX ORDER — CLOPIDOGREL BISULFATE 75 MG/1
75 TABLET ORAL DAILY
Qty: 30 TABLET | Refills: 0 | Status: SHIPPED | OUTPATIENT
Start: 2021-08-26 | End: 2021-10-06 | Stop reason: SDUPTHER

## 2021-08-26 RX ORDER — CLONIDINE HYDROCHLORIDE 0.1 MG/1
0.1 TABLET ORAL 2 TIMES DAILY
Qty: 60 TABLET | Refills: 3 | Status: CANCELLED | OUTPATIENT
Start: 2021-08-26

## 2021-08-26 RX ADMIN — HEPARIN SODIUM 5000 UNITS: 5000 INJECTION INTRAVENOUS; SUBCUTANEOUS at 05:55

## 2021-08-26 RX ADMIN — ASPIRIN 81 MG: 81 TABLET, CHEWABLE ORAL at 08:10

## 2021-08-26 RX ADMIN — METOPROLOL TARTRATE 50 MG: 50 TABLET, FILM COATED ORAL at 08:10

## 2021-08-26 RX ADMIN — AMLODIPINE BESYLATE 10 MG: 5 TABLET ORAL at 08:10

## 2021-08-26 RX ADMIN — FINASTERIDE 5 MG: 5 TABLET, FILM COATED ORAL at 08:10

## 2021-08-26 RX ADMIN — CLONIDINE HYDROCHLORIDE 0.1 MG: 0.1 TABLET ORAL at 08:10

## 2021-08-26 RX ADMIN — CLOPIDOGREL BISULFATE 75 MG: 75 TABLET ORAL at 08:10

## 2021-08-26 RX ADMIN — ATORVASTATIN CALCIUM 40 MG: 20 TABLET, FILM COATED ORAL at 08:10

## 2021-08-26 ASSESSMENT — PAIN SCALES - WONG BAKER
WONGBAKER_NUMERICALRESPONSE: 0

## 2021-08-26 ASSESSMENT — PAIN DESCRIPTION - PROGRESSION
CLINICAL_PROGRESSION: NOT CHANGED

## 2021-08-26 ASSESSMENT — ENCOUNTER SYMPTOMS
CHEST TIGHTNESS: 0
NAUSEA: 0
SHORTNESS OF BREATH: 0
VOMITING: 0
ABDOMINAL PAIN: 0

## 2021-08-26 ASSESSMENT — PAIN SCALES - GENERAL
PAINLEVEL_OUTOF10: 0
PAINLEVEL_OUTOF10: 2

## 2021-08-26 NOTE — PROGRESS NOTES
Hospitalist Progress Note      Name:  Phoenix Wilkinson /Age/Sex: 1960  (21 y.o. male)   MRN & CSN:  [id number] & [id number] Admission Date/Time: 2021  2:00 AM   Location:  [address] [place] [address] PCP: Divina Holland MD         Hospital Day: 2    Assessment and Plan:   Phoenix Wilkinson is a 57 y.o.  male  who presents with Chest pain/ uncontrolled hypertension      Assessment and plan:  Chest pain with history of hypertension, CAD s/p PTCA, multiple stents and  intervention for in-stent restenosis, 2017, 2018 . Troponin negative x3  EKG normal sinus rhythm, HR 66, QTc 417  Echo 2021: EF 50-55%  Stress test 2021: Negative for ischemia, EF 53%   Telemetry: SR/SB, Lowest HR 54/mt overnight    Hypertensive urgency  Initial blood pressure 204/129, initially was on Cardene drip stopped as blood pressure improved  Overnight blood pressure  /108    RENATO on CKD stage III:  · CR trending down 1.9 -1.7,with baseline CR 1.5  · UOP for last 24  mL  ·   Elevated D-dimer: D-dimer 447, CTA showed no PE    Nicotine abuse: Counseling smoking cessation    Medication noncompliance    Chronic conditions  · HLD: , HDL 33,  on 2021, continue statin  · BPH continue Proscar  · Anxiety  · Obesity: BMI 33.91 kg/m2, lifestyle and dietary modifications dietary modifications    Plan:  · Inpatient nephrology consulted: Appreciate recommendation  · Gentle hydration  · Started on amlodipine, Lopressor 50 mg twice daily and Catapres  · Hydralazine as needed for SBP more than 160  · Appreciate Cardiology recommendation  · Continue NTG for chest pain  · CBC and BMP daily  ·   Diet: ADULT DIET; Regular; 3 carb choices (45 gm/meal)  DVT prophylaxis: Heparin  GI prophylaxis  CODE STATUS:Full Code    Treatment and discharge plan discussed with patient/family. Patient/family voiced understanding. This patient was seen examined and treatment plan discussed with/supervising physician.     History of Present Illness: Chief Complaint: Chest pain    Subjective: Patient Seen and examined. Pain improved 1/10. Overnight blood pressure improved    Review of Systems   Constitutional: Negative for chills and fever. Respiratory: Negative for chest tightness and shortness of breath. Cardiovascular: Positive for chest pain. Negative for palpitations. Gastrointestinal: Negative for abdominal pain, nausea and vomiting. Genitourinary: Negative for dysuria and hematuria. Neurological: Negative for dizziness and weakness. Psychiatric/Behavioral: The patient is not nervous/anxious. ROS reviewed negative, unless as noted above    Objective: Intake/Output Summary (Last 24 hours) at 8/26/2021 0951  Last data filed at 8/26/2021 0556  Gross per 24 hour   Intake --   Output 450 ml   Net -450 ml      Vitals:   Vitals:    08/25/21 1647   BP:  146/111   Pulse:  61   Resp: 21   Temp: 98.1   SpO2: 98%     Physical Exam:   Physical Exam  Constitutional:       Appearance: Normal appearance. HENT:      Head: Normocephalic and atraumatic. Eyes:      Extraocular Movements: Extraocular movements intact. Conjunctiva/sclera: Conjunctivae normal.   Cardiovascular:      Rate and Rhythm: Normal rate and regular rhythm. Pulses: Normal pulses. Heart sounds: Normal heart sounds. No murmur heard. No friction rub. No gallop. Pulmonary:      Effort: Pulmonary effort is normal.      Breath sounds: Normal breath sounds. No wheezing, rhonchi or rales. Abdominal:      General: Abdomen is flat. Bowel sounds are normal.      Palpations: Abdomen is soft. Musculoskeletal:         General: Normal range of motion. Right lower leg: No edema. Left lower leg: No edema. Skin:     General: Skin is warm and dry. Neurological:      General: No focal deficit present. Mental Status: He is alert and oriented to person, place, and time.    Psychiatric:         Mood and Affect: Mood normal.       Medications: Medications:    sodium chloride flush  5-40 mL IntraVENous BID    heparin (porcine)  5,000 Units Subcutaneous 3 times per day    aspirin  81 mg Oral Daily    finasteride  5 mg Oral Daily    atorvastatin  40 mg Oral Daily    amLODIPine  10 mg Oral Daily    metoprolol tartrate  50 mg Oral BID    clopidogrel  75 mg Oral Daily    sodium chloride flush  5-40 mL IntraVENous 2 times per day    cloNIDine  0.1 mg Oral BID      Infusions:    sodium chloride      niCARdipine Stopped (08/25/21 2306)    sodium chloride       PRN Meds: sodium chloride flush, 5-40 mL, PRN  sodium chloride, 25 mL, PRN  ondansetron, 4 mg, Q8H PRN   Or  ondansetron, 4 mg, Q6H PRN  acetaminophen, 650 mg, Q6H PRN   Or  acetaminophen, 650 mg, Q6H PRN  polyethylene glycol, 17 g, Daily PRN  nitroGLYCERIN, 0.4 mg, Q5 Min PRN  hydrALAZINE, 10 mg, Q6H PRN        Recent Labs     08/25/21  0000 08/26/21  0757   WBC 5.1 7.3   HGB 16.9 17.2   HCT 51.7 51.0    231      Recent Labs     08/25/21  0000 08/26/21  0757    136   K 4.3 4.1    101   CO2 24 24   BUN 17 14   CREATININE 1.9* 1.7*     Recent Labs     08/25/21  0000   AST 19   ALT 19   BILITOT 0.3   ALKPHOS 83     Recent Labs     08/25/21  0000   INR 0.97     Recent Labs     08/25/21  0000 08/25/21  0354 08/25/21  1000   TROPONINT <0.010 <0.010 <0.010        Imaging reviewed  Echocardiogram complete 2D with doppler with color    Result Date: 8/25/2021  Transthoracic Echocardiography Report (TTE)  Demographics   Patient Name       Randa Smith    Date of Study       08/25/2021   Date of Birth      1960        Gender              Male   Age                57 year(s)        Race                Black   Patient Number     [id number]        Room Number         FI62   Visit Number       [id number]   Corporate ID       [id number]   Accession Number   [id number]        Sonographer         Tg Ahn                                                           RDCS, RDMS, RVT Ordering Physician Suleiman Villarreal MD Interpreting        Karol Salinas MD  Procedure Type of Study   TTE procedure:ECHOCARDIOGRAM COMPLETE 2D W DOPPLER W COLOR. Procedure Date Date: 08/25/2021 Start: 08:59 AM Study Location: Portable Technical Quality: Fair visualization Indications:Angina. Patient Status: Routine Height: 72 inches Weight: 250 pounds BSA: 2.34 m2 BMI: 33.91 kg/m2 HR: 97 bpm BP: 192/120 mmHg  Conclusions   Summary  Left ventricular systolic function is normal.  Ejection fraction is visually estimated at 50-55%. Mild left ventricular hypertrophy. No significant valvular disease noted. No evidence of any pericardial effusion. Signature   ------------------------------------------------------------------  Electronically signed by Karol Matthew MD (Interpreting  physician) on 08/25/2021 at 02:28 PM  ------------------------------------------------------------------   Findings   Left Ventricle  Left ventricular systolic function is normal.  Ejection fraction is visually estimated at 50-55%. Mild left ventricular hypertrophy. No regional wall motion abnormalites. Left ventricle size is normal.  Indeterminate diastolic function; E/A flow reversal is noted. Left Atrium  Essentially normal left atrium. Right Atrium  Essentially normal right atrium. Right Ventricle  Essentially normal right ventricle. Aortic Valve  Structurally normal aortic valve. Mitral Valve  Structurally normal mitral valve. Tricuspid Valve  Trace tricuspid regurgitation; normal RVSP. Pulmonic Valve  The pulmonic valve was not well visualized. Pericardial Effusion  No evidence of any pericardial effusion. Pleural Effusion  No evidence of pleural effusion. Miscellaneous  IVC and abdominal aorta are within normal limits.   M-Mode/2D Measurements & Calculations   LV Diastolic Dimension:  LV Systolic Dimension:  LA Dimension: 3.2 cmAO Root  4.93 cm 2.92 cm                 Dimension: 3.3 cmLA Area:  LV FS:40.8 %             LV Volume Diastolic: 75 69.5 cm2  LV PW Diastolic: 1.30 cm ml  LV PW Systolic: 8.76 cm  LV Volume Systolic: 36  Septum Diastolic: 9.20   ml  cm                       LV EDV/LV EDV Index: 75  Septum Systolic: 5.88 cm QR/34 O5CB ESV/LV ESV   LA/Aorta: 0.97  CO: 5.74 l/min           Index: 36 ml/15 m2  CI: 2.45 l/m*m2          EF Calculated (A4C): 52 LA volume/Index: 29 ml                           %                       /94Y4  LV Area Diastolic: 12.9  EF Calculated (2D):  cm2                      71.2 %  LV Area Systolic: 04.5  cm2                      LV Length: 7.48 cm                            LVOT: 2.1 cm  Doppler Measurements & Calculations   MV Peak E-Wave: 58.2    AV Peak Velocity: 150 cm/s   LVOT Peak Velocity: 102  cm/s                    AV Peak Gradient: 9 mmHg     cm/s  MV Peak A-Wave: 80 cm/s AV Mean Velocity: 116 cm/s   LVOT Mean Velocity:  MV E/A Ratio: 0.73      AV Mean Gradient: 6 mmHg     69.4 cm/s  MV Peak Gradient: 1.35  AV VTI: 23.5 cm              LVOT Peak Gradient: 4  mmHg                    AV Area (Continuity):2.52    mmHgLVOT Mean Gradient:                          cm2                          2 mmHg  MV P1/2t: 42 msec  MVA by PHT:5.24 cm2     LVOT VTI: 17.1 cm   MV E' Septal Velocity:  6.03 cm/s  MV E' Lateral Velocity:  7.24 cm/s  MV E/E' septal: 9.65  MV E/E' lateral: 8.04      XR CHEST (2 VW)    Result Date: 8/25/2021  EXAMINATION: TWO XRAY VIEWS OF THE CHEST 8/24/2021 11:41 pm COMPARISON: None. HISTORY: ORDERING SYSTEM PROVIDED HISTORY: chest pain TECHNOLOGIST PROVIDED HISTORY: Reason for exam:->chest pain Reason for Exam: chest pain Acuity: Acute Type of Exam: Initial FINDINGS: The lungs are adequately inflated. There is no focal consolidation, pleural effusion or pneumothorax. The heart and mediastinal contours are within normal limits. No acute bony findings are identified.      No acute process. CTA PULMONARY W CONTRAST    Result Date: 8/25/2021  EXAMINATION: CTA OF THE CHEST 8/25/2021 2:37 am TECHNIQUE: CTA of the chest was performed after the administration of intravenous contrast.  Multiplanar reformatted images are provided for review. MIP images are provided for review. Dose modulation, iterative reconstruction, and/or weight based adjustment of the mA/kV was utilized to reduce the radiation dose to as low as reasonably achievable. COMPARISON: None. HISTORY: ORDERING SYSTEM PROVIDED HISTORY: Evaluate for pulmonary embolism TECHNOLOGIST PROVIDED HISTORY: Reason for exam:->Evaluate for pulmonary embolism Decision Support Exception - unselect if not a suspected or confirmed emergency medical condition->Emergency Medical Condition (MA) Reason for Exam: dora d-dimer/sob/cp FINDINGS: Pulmonary Arteries: Pulmonary arteries are adequately opacified for evaluation. No evidence of intraluminal filling defect to suggest pulmonary embolism. Main pulmonary artery is normal in caliber. Mediastinum: No evidence of mediastinal lymphadenopathy. The heart and pericardium demonstrate no acute abnormality. There is no acute abnormality of the thoracic aorta. Lungs/pleura: The lungs are without acute process. No focal consolidation or pulmonary edema. No evidence of pleural effusion or pneumothorax. Upper Abdomen: Limited images of the upper abdomen are unremarkable. Soft Tissues/Bones: The thyroid gland is enlarged and heterogeneous. No acute bone or soft tissue abnormality. No evidence of pulmonary embolism or acute pulmonary abnormality.      NM MYOCARDIAL SPECT REST EXERCISE OR RX    Result Date: 8/25/2021  Cardiac Perfusion Imaging   Demographics   Patient Name      Melissa Hooper     Date of study        08/25/2021   Date of Birth     1960         Gender               Male   Age               57 year(s)         Race                 Black   Patient Number    [id number]         Room Number ED03   Visit Number      [id number]          Height               72 inches   Corporate ID      [id number]           Weight               250 pounds   Accession Number  [id number]                                        NM Technologist      Buster Smith  Physician         MD                 Cardiologist         MD   Conclusions   Summary  ECG portion of stress test is negative for ischemia by diagnostic criteria. Normal EF 53 % with normal ventricular contractility. Decreased uptake  inferiorly due to diaphragmatic artifact. Normal stress myocardial perfusion. This is a normal study. Signatures   ------------------------------------------------------------------  Electronically signed by Latha Banks MD (Interpreting  cardiologist) on 08/25/2021 at 14:46  ------------------------------------------------------------------  Procedure Procedure Type:   Nuclear Stress Test:Pharmacological, Myocardial Perfusion Imaging with  Pharm, NM MYOCARDIAL SPECT REST EXERCISE OR RX  Indications: CAD and Chest pain. Risk Factors   The patient risk factors include:prior PCI;cerebrovascular disease, Current  - Frequency unknown tobacco use, hypercholesterolemia, hypertension and  family history of premature CAD. Stress Protocols   Resting ECG  Normal sinus rhythm. Resting HR:82 bpm  Resting BP:203/113 mmHg  Stress Protocol:Pharmacologic - Lexiscan  Peak HR:98 bpm                               HR/BP product:19894  Peak BP:203/113 mmHg  Predicted HR: 158 bpm  % of predicted HR: 62   Exercise duration: 01:00 min  Reason for termination:Completed   ECG Findings  Normal sinus rhythm. Arrhythmias  No rhythm abnormality. Symptoms  Mild dyspnea . Symptoms resolved with rest.   Stress Interpretation  ECG portion of stress test is negative for ischemia by diagnostic criteria.   Procedure Medications   - Lexiscan I.V. bolus (over 15sec.) 0.4 mg admininstered @ 08/25/2020 08:25. Imaging Protocols   Rest                             Stress   Isotope:Sestamibi 99mTc          Isotope: Sestamibi 99mTc  Isotope dose:10.1 mCi            Isotope dose:33 mCi  Administration route: I.V. Administration route: I.V. Injection Date:08/25/2020 07:15  Injection Date:08/25/2020 08:25  Scan Date:08/25/2020 08:15       Scan Date:08/25/2020 09:25   Technique:        SPECT          Technique:        Gated                                                     SPECT   Procedure Description   Upon patient arrival, the patient is identified using two identifiers and  the physician order is verified. An IV is established and 8-11mCi of 99mTc  Sestamibi is intravenously injected and followed with 10mL 0.9% Normal  Saline flush. A circulation period of 45 minutes occurs prior to resting  SPECT imaging. After imaging is complete the patient is escorted to the  stress lab. The patient is connected to the ECG and blood pressure is  measured. The RN starts the stress portion of the exam and rapidly  intravenously injects Lexiscan (regadenosine) 0.4mg over a period of 10 to15  seconds and follows with 5mL 0.9% Normal Saline flush. Immediately following  the Nuclear Technologist intravenously injects 22-33mCi of 99mTc Sestamibi  and 5mL 0.9% Normal Saline flush. After completion, recovery, and removal of  the IV, the patient rests during the second circulation period of 45  minutes. Final stress SPECT gated imaging is performed. The patient may  return home or to their room after stress imaging. The images are processed  and final charting is completed and sent to the appropriate cardiologist for  interpretation and reporting. Perfusion Interpretation   Normal EF 53 % with normal ventricular contractility. Decreased uptake  inferiorly due to diaphragmatic artifact.   Imaging Results    Summed scores     - Summed stress score: 5     - Summed rest score: 0     - Summed difference score:    5   Rest ejection  Ejection fraction:53 %  EDV :131 ml  ESV :61 ml  Stroke volume :70 ml  Medical History   Accession#:  [id number]  Admission Data Admission date: 08/25/2021 Admission Time: 02:00 Hospital Status: Inpatient.       Electronically signed by JASPAL Turner on 8/26/2021 at 9:51 AM

## 2021-08-26 NOTE — DISCHARGE SUMMARY
Discharge Summary    Name:  Daisy Man /Age/Sex: 1960  (89 y.o. male)   MRN & CSN:  [id number] & [id number] Admission Date/Time: 2021  2:00 AM   Attending:  Felisha Ellis DO Discharging Physician: JASPAL Rm     Hospital Course:   Daisy Man is a 57 y.o.  male  who presents with Chest pain        Admit date: 2021    Discharge date and time: No discharge date for patient encounter. Admitting Physician: Felisha Ellis DO     Discharge Physician: JASPAL Rm    Admission Diagnoses:   Unstable angina ([address] [I20.0]  Chest pain [R07.9]  Uncontrolled hypertension [I10]    Discharge Diagnoses:  Severe chest pain in the setting of uncontrolled hypertension  Hypertension, uncontrolled  RENATO on CKD  Pure hypercholesterolemia  CAD s/p PTCA, multiple stents    Hospital course:  May Mass a 57 y.o.  male with history of CAD, s/p multiple stents, noncompliance with medication, PCI for in-stent restenosis, hypertension, hyperlipidemia, GERD who presents with Chest pain associated with shortness of breath and radiating neck pain. BP elevated on admission, initially started on Cardene drip, which later weaned off. Apparently patient was not taking his hypertensive medications for 3 months. Cardiology consulted. Patient had cardiac work-up including echo and stress test.  His D-dimer  Was 447 and CT chest ruled out PE and DVT. He was started on oral antihypertensives and blood pressure improved. Troponins were negative. EKG was normal sinus rhythm, EF 50-50 percentage on echo 2021. Stress test negative for ischemia. Since patient's creatinine elevated with questionable acute on chronic kidney failure and patient received IV contrast, nephrology consulted. His creatinine initially elevated as follows 1.9, 1.7 with a baseline CR of 1.5, Gentle hydration with IV fluids  improved the creatinine overnight. Chest pain free on day of discharge.  Discussed with patient extensively about  Medications compliance. Patient was able to ambulate without difficulty. VS stable. Chronic conditions were stable. The patient expressed appropriate understanding of and agreement with the discharge recommendations, medications, and plan. Discussed with cardiology and nephrology, okay with discharging patients with current hypertensive regimen. This patient was seen ,evaluated and discharge plan discussed with supervising physician      Consults this admission:  IP CONSULT TO HOSPITALIST  IP CONSULT TO NEPHROLOGY  IP CONSULT TO [address] [place], [place] [address]    Discharge Instruction:   Follow up appointments:   Primary care physician:  within 1 weeks  Follow-up with cardiology in 1 week  Follow-up with nephrology in 1 week      Diet:  regular diet   Activity: activity as tolerated  Disposition: Discharged to:   [x]Home, [][facility], []SNF, []Acute Rehab, []Hospice   Condition on discharge: Stable    Discharge Medications: Leeann Marti   Home Medication Instructions KCD:[id number]    Printed on:08/26/21 8041   Medication Information                      amLODIPine (NORVASC) 10 MG tablet  Take 1 tablet by mouth daily Hold if Systolic BP <119             aspirin 81 MG tablet  Take 1 tablet by mouth daily             atorvastatin (LIPITOR) 40 MG tablet  Take 1 tablet by mouth daily             cloNIDine (CATAPRES) 0.1 MG tablet  Take 1 tablet by mouth 2 times daily             clopidogrel (PLAVIX) 75 MG tablet  Take 1 tablet by mouth daily             finasteride (PROSCAR) 5 MG tablet  Take 1 tablet by mouth daily             metoprolol tartrate (LOPRESSOR) 50 MG tablet  Take 1 tablet by mouth 2 times daily Hold if SBP < 110 and HR <65 call cardiology             Multiple Vitamins-Minerals (MENS MULTIPLE VITAMIN/LYCOPENE) TABS  Take  by mouth. nitroGLYCERIN (NITROSTAT) 0.4 MG SL tablet  up to max of 3 total doses. If no relief after 1 dose, call 911.                  Objective Findings at Discharge:   /81   Pulse 57   Temp 97.6 °F (36.4 °C) (Oral)   Resp 16   Ht 6' (1.829 m)   Wt 250 lb (113.4 kg)   SpO2 98%   BMI 33.91 kg/m²            Physical Exam  Constitutional:       Appearance: Normal appearance. He is obese. HENT:      Head: Normocephalic and atraumatic. Cardiovascular:      Pulses: Normal pulses. Heart sounds: Normal heart sounds. No murmur heard. No friction rub. No gallop. Pulmonary:      Effort: Pulmonary effort is normal.      Breath sounds: Normal breath sounds. Abdominal:      General: Abdomen is flat. Bowel sounds are normal.      Palpations: Abdomen is soft. Musculoskeletal:         General: Normal range of motion. Right lower leg: No edema. Left lower leg: No edema. Skin:     General: Skin is warm. Neurological:      General: No focal deficit present. Mental Status: He is alert and oriented to person, place, and time. BMP/CBC  Recent Labs     08/25/21  0000 08/26/21  0757    136   K 4.3 4.1    101   CO2 24 24   BUN 17 14   CREATININE 1.9* 1.7*   WBC 5.1 7.3   HCT 51.7 51.0    231       IMAGING:  Echocardiogram complete 2D with doppler with color    Result Date: 8/25/2021  Transthoracic Echocardiography Report (TTE)  Demographics   Patient Name       Mari Panchal    Date of Study       08/25/2021   Date of Birth      1960        Gender              Male   Age                57 year(s)        Race                Black   Patient Number     [id number]        Room Number         TH35   Visit Number       [id number]   Corporate ID       [id number]   Accession Number   [id number]        Guille Abdullahi RDMS, RVT   Ordering Physician Lian Escobar MD Interpreting        Audrey Salinas MD  Procedure Type of Study   TTE procedure:ECHOCARDIOGRAM COMPLETE 2D W DOPPLER W COLOR.   Procedure Date Date: 08/25/2021 Start: 08:59 AM Study Location: Portable Technical Quality: Fair visualization Indications:Angina. Patient Status: Routine Height: 72 inches Weight: 250 pounds BSA: 2.34 m2 BMI: 33.91 kg/m2 HR: 97 bpm BP: 192/120 mmHg  Conclusions   Summary  Left ventricular systolic function is normal.  Ejection fraction is visually estimated at 50-55%. Mild left ventricular hypertrophy. No significant valvular disease noted. No evidence of any pericardial effusion. Signature   ------------------------------------------------------------------  Electronically signed by Adriane Paez MD (Interpreting  physician) on 08/25/2021 at 02:28 PM  ------------------------------------------------------------------   Findings   Left Ventricle  Left ventricular systolic function is normal.  Ejection fraction is visually estimated at 50-55%. Mild left ventricular hypertrophy. No regional wall motion abnormalites. Left ventricle size is normal.  Indeterminate diastolic function; E/A flow reversal is noted. Left Atrium  Essentially normal left atrium. Right Atrium  Essentially normal right atrium. Right Ventricle  Essentially normal right ventricle. Aortic Valve  Structurally normal aortic valve. Mitral Valve  Structurally normal mitral valve. Tricuspid Valve  Trace tricuspid regurgitation; normal RVSP. Pulmonic Valve  The pulmonic valve was not well visualized. Pericardial Effusion  No evidence of any pericardial effusion. Pleural Effusion  No evidence of pleural effusion. Miscellaneous  IVC and abdominal aorta are within normal limits.   M-Mode/2D Measurements & Calculations   LV Diastolic Dimension:  LV Systolic Dimension:  LA Dimension: 3.2 cmAO Root  4.93 cm                  2.92 cm                 Dimension: 3.3 cmLA Area:  LV FS:40.8 %             LV Volume Diastolic: 75 52.6 cm2  LV PW Diastolic: 1.08 cm ml  LV PW Systolic: 9.89 cm  LV Volume Systolic: 36  Septum Diastolic: 1.36   ml  cm LV EDV/LV EDV Index: 75  Septum Systolic: 5.14 cm JN/14 Y6BS ESV/LV ESV   LA/Aorta: 0.97  CO: 5.74 l/min           Index: 36 ml/15 m2  CI: 2.45 l/m*m2          EF Calculated (A4C): 52 LA volume/Index: 29 ml                           %                       /98E4  LV Area Diastolic: 60.8  EF Calculated (2D):  cm2                      71.2 %  LV Area Systolic: 77.0  cm2                      LV Length: 7.48 cm                            LVOT: 2.1 cm  Doppler Measurements & Calculations   MV Peak E-Wave: 58.2    AV Peak Velocity: 150 cm/s   LVOT Peak Velocity: 102  cm/s                    AV Peak Gradient: 9 mmHg     cm/s  MV Peak A-Wave: 80 cm/s AV Mean Velocity: 116 cm/s   LVOT Mean Velocity:  MV E/A Ratio: 0.73      AV Mean Gradient: 6 mmHg     69.4 cm/s  MV Peak Gradient: 1.35  AV VTI: 23.5 cm              LVOT Peak Gradient: 4  mmHg                    AV Area (Continuity):2.52    mmHgLVOT Mean Gradient:                          cm2                          2 mmHg  MV P1/2t: 42 msec  MVA by PHT:5.24 cm2     LVOT VTI: 17.1 cm   MV E' Septal Velocity:  6.03 cm/s  MV E' Lateral Velocity:  7.24 cm/s  MV E/E' septal: 9.65  MV E/E' lateral: 8.04      XR CHEST (2 VW)    Result Date: 8/25/2021  EXAMINATION: TWO XRAY VIEWS OF THE CHEST 8/24/2021 11:41 pm COMPARISON: None. HISTORY: ORDERING SYSTEM PROVIDED HISTORY: chest pain TECHNOLOGIST PROVIDED HISTORY: Reason for exam:->chest pain Reason for Exam: chest pain Acuity: Acute Type of Exam: Initial FINDINGS: The lungs are adequately inflated. There is no focal consolidation, pleural effusion or pneumothorax. The heart and mediastinal contours are within normal limits. No acute bony findings are identified. No acute process.      CTA PULMONARY W CONTRAST    Result Date: 8/25/2021  EXAMINATION: CTA OF THE CHEST 8/25/2021 2:37 am TECHNIQUE: CTA of the chest was performed after the administration of intravenous contrast.  Multiplanar reformatted images are Manisha Vega  Physician         MD                 Cardiologist         MD   Conclusions   Summary  ECG portion of stress test is negative for ischemia by diagnostic criteria. Normal EF 53 % with normal ventricular contractility. Decreased uptake  inferiorly due to diaphragmatic artifact. Normal stress myocardial perfusion. This is a normal study. Signatures   ------------------------------------------------------------------  Electronically signed by Adi Hernandez MD (Interpreting  cardiologist) on 08/25/2021 at 14:46  ------------------------------------------------------------------  Procedure Procedure Type:   Nuclear Stress Test:Pharmacological, Myocardial Perfusion Imaging with  Pharm, NM MYOCARDIAL SPECT REST EXERCISE OR RX  Indications: CAD and Chest pain. Risk Factors   The patient risk factors include:prior PCI;cerebrovascular disease, Current  - Frequency unknown tobacco use, hypercholesterolemia, hypertension and  family history of premature CAD. Stress Protocols   Resting ECG  Normal sinus rhythm. Resting HR:82 bpm  Resting BP:203/113 mmHg  Stress Protocol:Pharmacologic - Lexiscan  Peak HR:98 bpm                               HR/BP product:19894  Peak BP:203/113 mmHg  Predicted HR: 158 bpm  % of predicted HR: 62   Exercise duration: 01:00 min  Reason for termination:Completed   ECG Findings  Normal sinus rhythm. Arrhythmias  No rhythm abnormality. Symptoms  Mild dyspnea . Symptoms resolved with rest.   Stress Interpretation  ECG portion of stress test is negative for ischemia by diagnostic criteria. Procedure Medications   - Lexiscan I.V. bolus (over 15sec.) 0.4 mg admininstered @ 08/25/2020 08:25.   Imaging Protocols   Rest                             Stress   Isotope:Sestamibi 99mTc          Isotope: Sestamibi 99mTc  Isotope dose:10.1 mCi            Isotope dose:33 mCi  Administration route: I.V. Administration route: I.V. Injection Date:08/25/2020 07:15  Injection Date:08/25/2020 08:25  Scan Date:08/25/2020 08:15       Scan Date:08/25/2020 09:25   Technique:        SPECT          Technique:        Gated                                                     SPECT   Procedure Description   Upon patient arrival, the patient is identified using two identifiers and  the physician order is verified. An IV is established and 8-11mCi of 99mTc  Sestamibi is intravenously injected and followed with 10mL 0.9% Normal  Saline flush. A circulation period of 45 minutes occurs prior to resting  SPECT imaging. After imaging is complete the patient is escorted to the  stress lab. The patient is connected to the ECG and blood pressure is  measured. The RN starts the stress portion of the exam and rapidly  intravenously injects Lexiscan (regadenosine) 0.4mg over a period of 10 to15  seconds and follows with 5mL 0.9% Normal Saline flush. Immediately following  the Nuclear Technologist intravenously injects 22-33mCi of 99mTc Sestamibi  and 5mL 0.9% Normal Saline flush. After completion, recovery, and removal of  the IV, the patient rests during the second circulation period of 45  minutes. Final stress SPECT gated imaging is performed. The patient may  return home or to their room after stress imaging. The images are processed  and final charting is completed and sent to the appropriate cardiologist for  interpretation and reporting. Perfusion Interpretation   Normal EF 53 % with normal ventricular contractility. Decreased uptake  inferiorly due to diaphragmatic artifact. Imaging Results    Summed scores     - Summed stress score: 5     - Summed rest score: 0     - Summed difference score:    5   Rest ejection  Ejection fraction:53 %  EDV :131 ml  ESV :61 ml  Stroke volume :70 ml  Medical History   Accession#:  [id number]  Admission Data Admission date: 08/25/2021 Admission Time: 02:00 Hospital Status: Inpatient.     Discharge Time of 35minutes    Electronically signed by JASPAL Fernandez on 8/26/2021 at 4:26 PM

## 2021-08-26 NOTE — CARE COORDINATION
CM has reviewed pt's chart for needs. CM screening shows that pt has PCP and is independent PTA. No insurance listed for pt. Referral made to [facility] [employer]. She informed CM that she screened him in the ED yesterday and pt is over income and is theron only. D/c plan is home. Notify CM if any new d/c needs arise.    TE

## 2021-08-26 NOTE — PROGRESS NOTES
[facility] [place]  [address] [facility],[address]  Phone: [phone]    Fax [phone]                  Bevin Councilman, MD, Shell Higuera MD, [address] Tori Valenzuela MD, MD Felecia Benson MD Truett Clark, MD Maris Ran, MD Linette Gardner, APRMARK Sosa, APRN  Hammad Anisa, APRN    Ray Haynes, APRN    Cardiology Progress Note     Today's Plan: continue medications    Admit Date:  8/25/2021    Consult reason/ Seen today for: HTN, Chest pain    Subjective and  Overnight Events:  Patient states that he is feeling well. Denies any chest pain this morning. BP improved     Assessment / Plan / Recommendation:     1. Severe chest pain in setting of uncontrolled hypertension blood pressure 230s. Blood pressures significantly improved. Stress test show sno ischemia  Discussed at length dietary intake and blood pressure effect. He states that he had not taken dietary changes into account for his HTN. He states understanding that he must stay on his medications at all times. 2. Chest Pain: serial cardiac enzymes, EKG reviewed  3. Renal failure: improving. as per primary team nephrology  4. DVT prophylaxis if no contraindication  5. Dyslipidemia: continue statins       History of Presenting Illness:    Chief complain on admission : 57 y. o.year old who is admitted for  Chief Complaint   Patient presents with    Chest Pain     radiating to left arm        Past medical history:    has a past medical history of Blood transfusion, CAD (coronary artery disease), Chest pain, GERD (gastroesophageal reflux disease), H/O Doppler lower arterial ultrasound, H/O Doppler lower venous ultrasound, H/O exercise stress test, H/O exercise stress test, History of exercise stress test, History of exercise stress test, History of GI bleed, History of nuclear stress test, History of nuclear stress test, Hx of echocardiogram, HX OTHER MEDICAL, Hyperlipidemia, Hypertension, Osteoarthritis, Prostatic hypertrophy, benign, S/P PTCA (percutaneous transluminal coronary angioplasty), and Unspecified cerebral artery occlusion with cerebral infarction. Past surgical history:   has a past surgical history that includes Shoulder arthroscopy (Right, 1992); other surgical history (11/2012); Coronary angioplasty with stent (06/01/2017); and cardiovascular stress test (05/2017). Social History:   reports that he has been smoking cigars. He has a 7.50 pack-year smoking history. He has never used smokeless tobacco. He reports previous alcohol use. He reports that he does not use drugs. Family history:  family history includes Alcohol Abuse in his brother; Alzheimer's Disease in his mother; Breast Cancer in his mother; Cancer in his brother; Coronary Art Dis in his father; Heart Attack (age of onset: 80) in his father; Heart Disease in his brother and brother; High Blood Pressure in his father; Hypertension in his brother and father; Learning Disabilities in his brother; Stroke in his father. No Known Allergies    Review of Systems   All 14 systems were reviewed and are negative  Except for the positive findings  which as documented     BP (!) 155/101   Pulse 57   Temp 97.6 °F (36.4 °C) (Oral)   Resp 24   Ht 6' (1.829 m)   Wt 250 lb (113.4 kg)   SpO2 97%   BMI 33.91 kg/m²       Intake/Output Summary (Last 24 hours) at 8/26/2021 0732  Last data filed at 8/26/2021 0556  Gross per 24 hour   Intake --   Output 450 ml   Net -450 ml       Physical Exam  Vitals reviewed. Constitutional:       General: He is not in acute distress. Appearance: Normal appearance. He is not ill-appearing. HENT:      Head: Atraumatic. Neck:      Vascular: No carotid bruit. Cardiovascular:      Rate and Rhythm: Normal rate and regular rhythm. Pulses: Normal pulses. Heart sounds: Normal heart sounds. No murmur heard.      Pulmonary:      Effort: Pulmonary effort is normal. No respiratory distress. Breath sounds: Normal breath sounds. Musculoskeletal:         General: No swelling or deformity. Cervical back: Neck supple. No muscular tenderness. Neurological:      Mental Status: He is alert. Telemetry Reviewed:   Sinus rhythm     Medications:    sodium chloride flush  5-40 mL IntraVENous BID    heparin (porcine)  5,000 Units Subcutaneous 3 times per day    aspirin  81 mg Oral Daily    finasteride  5 mg Oral Daily    atorvastatin  40 mg Oral Daily    amLODIPine  10 mg Oral Daily    metoprolol tartrate  50 mg Oral BID    clopidogrel  75 mg Oral Daily    sodium chloride flush  5-40 mL IntraVENous 2 times per day    cloNIDine  0.1 mg Oral BID      sodium chloride      niCARdipine Stopped (08/25/21 2306)    sodium chloride       sodium chloride flush, sodium chloride, ondansetron **OR** ondansetron, acetaminophen **OR** acetaminophen, polyethylene glycol, nitroGLYCERIN, hydrALAZINE    Lab Data:  CBC:   Recent Labs     08/25/21  0000   WBC 5.1   HGB 16.9   HCT 51.7   MCV 91.8        BMP:   Recent Labs     08/25/21  0000      K 4.3      CO2 24   BUN 17   CREATININE 1.9*     PT/INR:   Recent Labs     08/25/21  0000   PROTIME 12.5   INR 0.97     BNP:  No results for input(s): PROBNP in the last 72 hours. TROPONIN:   Recent Labs     08/25/21  0000 08/25/21  0354 08/25/21  1000   TROPONINT <0.010 <0.010 <0.010        ECHO :   Echocardiogram  8/25/2021  Summary   Left ventricular systolic function is normal.   Ejection fraction is visually estimated at 50-55%. Mild left ventricular hypertrophy. No significant valvular disease noted. No evidence of any pericardial effusion. All labs, medications and tests reviewed by myself , continue all other medications of all above medical condition listed as is except for changes mentioned above. Thank you very much for consult , please call with questions.     Electronically signed by Gracie Perdomo Iram Montero, APRN - CNP on 8/26/2021 at 7:32 AM      CARDIOLOGY ATTENDING ADDENDUM    I have seen ,spoken to  and examined this patient personally, independently of the nurse practitioner. I have reviewed the hospital care given to date and reviewed all pertinent labs and imaging. The plan was developed mutually at the time of the visit with the patient,  NP   and myself. I have spoken with patient, nursing staff and provided written and verbal instructions . The above note has been reviewed and I agree with the assessment, diagnosis, and treatment plan with changes made by me as follows     HPI:  I have reviewed the above HPI  And agree with above   Marixa Desai is a 57 y. o.year old who and presents with had concerns including Chest Pain (radiating to left arm).   Chief Complaint   Patient presents with    Chest Pain     radiating to left arm     Please review addendum/changes made to note above   Interval history:  bp improved, chest [pain resolved     Physical Exam:  General:  Awake, alert, NAD  Head:normal  Eye:normal  Neck:  No JVD   Chest:  Clear to auscultation, respiration easy  Cardiovascular:  S1 and S2 audible, No added heart sounds, No signs of ankle edema, or volume overload, No evidence of JVD, No crackles  Abdomen:   nontender  Extremities:  No  edema  Pulses; palpable  Neuro: grossly normal      MEDICAL DECISION MAKING;    I agree with the above plan, which was planned by myself and discussed with NP.  bp improved after starting home meds  dicsharge home       Sotero Corea MD [facility] 08/26/21

## 2021-08-31 ENCOUNTER — TELEPHONE (OUTPATIENT)
Dept: CARDIOLOGY CLINIC | Age: 61
End: 2021-08-31

## 2021-09-27 ENCOUNTER — OFFICE VISIT (OUTPATIENT)
Dept: CARDIOLOGY CLINIC | Age: 61
End: 2021-09-27

## 2021-09-27 VITALS
HEART RATE: 75 BPM | HEIGHT: 72 IN | WEIGHT: 257.8 LBS | SYSTOLIC BLOOD PRESSURE: 126 MMHG | BODY MASS INDEX: 34.92 KG/M2 | DIASTOLIC BLOOD PRESSURE: 99 MMHG

## 2021-09-27 DIAGNOSIS — M79.662 PAIN IN BOTH LOWER LEGS: ICD-10-CM

## 2021-09-27 DIAGNOSIS — E78.5 DYSLIPIDEMIA: ICD-10-CM

## 2021-09-27 DIAGNOSIS — R07.9 CHEST PAIN, UNSPECIFIED TYPE: ICD-10-CM

## 2021-09-27 DIAGNOSIS — I73.9 CLAUDICATION (HCC): ICD-10-CM

## 2021-09-27 DIAGNOSIS — E78.00 PURE HYPERCHOLESTEROLEMIA: ICD-10-CM

## 2021-09-27 DIAGNOSIS — I10 ESSENTIAL HYPERTENSION: ICD-10-CM

## 2021-09-27 DIAGNOSIS — M79.661 PAIN IN BOTH LOWER LEGS: ICD-10-CM

## 2021-09-27 DIAGNOSIS — F32.A DEPRESSION, UNSPECIFIED DEPRESSION TYPE: Primary | ICD-10-CM

## 2021-09-27 DIAGNOSIS — Z98.61 S/P PTCA (PERCUTANEOUS TRANSLUMINAL CORONARY ANGIOPLASTY): ICD-10-CM

## 2021-09-27 DIAGNOSIS — I25.110 CORONARY ARTERY DISEASE INVOLVING NATIVE CORONARY ARTERY OF NATIVE HEART WITH UNSTABLE ANGINA PECTORIS (HCC): ICD-10-CM

## 2021-09-27 DIAGNOSIS — I25.10 ASCVD (ARTERIOSCLEROTIC CARDIOVASCULAR DISEASE): ICD-10-CM

## 2021-09-27 PROCEDURE — 99214 OFFICE O/P EST MOD 30 MIN: CPT | Performed by: INTERNAL MEDICINE

## 2021-09-27 PROCEDURE — 93000 ELECTROCARDIOGRAM COMPLETE: CPT | Performed by: INTERNAL MEDICINE

## 2021-09-27 RX ORDER — LISINOPRIL 5 MG/1
5 TABLET ORAL DAILY
Qty: 90 TABLET | Refills: 1 | Status: ON HOLD | OUTPATIENT
Start: 2021-09-27 | End: 2022-07-20 | Stop reason: HOSPADM

## 2021-09-27 RX ORDER — SERTRALINE HYDROCHLORIDE 25 MG/1
25 TABLET, FILM COATED ORAL DAILY
Qty: 30 TABLET | Refills: 1 | Status: SHIPPED | OUTPATIENT
Start: 2021-09-27 | End: 2022-01-11

## 2021-09-27 NOTE — PATIENT INSTRUCTIONS
**It is YOUR responsibilty to bring medication bottles and/or updated medication list to [address] [place] [address]. This will allow us to better serve you and all your healthcare needs**  Please be informed that if you contact our office outside of normal business hours the physician on call cannot help with any scheduling or rescheduling issues, procedure instruction questions or any type of medication issue. We advise you for any urgent/emergency that you go to the nearest emergency room!     PLEASE CALL OUR OFFICE DURING NORMAL BUSINESS HOURS    Monday - Friday   8 am to 5 pm    [place]: [address]: [phone]    [place]:  [phone]

## 2021-09-27 NOTE — PROGRESS NOTES
CARDIOLOGY  NOTE  Chief Complaint: Follow-up foe ASCVD. HPI:   Kenya Marte is a 57y.o. year old who has history as noted below. He had moved to [facility] but is back now . His blood pressure is high today but he thinks its because he had a fight with his daughter this morning who thinks he is depressed  He was admitted last month with chest  pain when I saw him in hospital as he was not taking any of his cardiac meds but he says he is taking his meds now   HE is tearfully in office today . He says chest pain episodes are better , He has used nitro once since he got out of hospital last month     Kenya Marte was  Initially seen in the hospital and underwent RCA intervention in June 2017  concerning for unstable angina. He had repeat intervention of  In stent restenosis the right coronary artery and POBA of PDA  in May 2018  HE has had RCA intervention twice  since 2017 . He had LAD intervention in June 2018 . Compliance and depression have danelle a concern      Current Outpatient Medications   Medication Sig Dispense Refill    lisinopril (PRINIVIL;ZESTRIL) 5 MG tablet Take 1 tablet by mouth daily 90 tablet 1    sertraline (ZOLOFT) 25 MG tablet Take 1 tablet by mouth daily 30 tablet 1    cloNIDine (CATAPRES) 0.1 MG tablet Take 1 tablet by mouth 2 times daily 60 tablet 0    finasteride (PROSCAR) 5 MG tablet Take 1 tablet by mouth daily 30 tablet 0    aspirin 81 MG tablet Take 1 tablet by mouth daily 30 tablet 0    nitroGLYCERIN (NITROSTAT) 0.4 MG SL tablet up to max of 3 total doses.  If no relief after 1 dose, call 911. 25 tablet 0    atorvastatin (LIPITOR) 40 MG tablet Take 1 tablet by mouth daily 30 tablet 0    metoprolol tartrate (LOPRESSOR) 50 MG tablet Take 1 tablet by mouth 2 times daily Hold if SBP < 110 and HR <65 call cardiology 60 tablet 0    amLODIPine (NORVASC) 10 MG tablet Take 1 tablet by mouth daily Hold if Systolic BP <532 30 tablet 0    clopidogrel (PLAVIX) 75 MG tablet Take 1 tablet by mouth daily 30 tablet 0    Multiple Vitamins-Minerals (MENS MULTIPLE VITAMIN/LYCOPENE) TABS Take  by mouth. No current facility-administered medications for this visit. Allergies:   Patient has no known allergies. Patient History:  Past Medical History:   Diagnosis Date    Blood transfusion 2010    CAD (coronary artery disease)     Chest pain     GERD (gastroesophageal reflux disease)     H/O Doppler lower arterial ultrasound 02/13/2020     No evidence of significant occlusive arterial disease.  H/O Doppler lower venous ultrasound 08/20/2019     No evidence of significant venous insufficiency noted in the bilateral lower, no DVT,SVT.    H/O exercise stress test 05/31/2018    treadmill    H/O exercise stress test 07/23/2018 7/2018: treadmill, No Ischemia noted pt can start rehab.     History of exercise stress test 06/19/2017    treadmill    History of exercise stress test 06/27/2017    treadmill    History of GI bleed 2010    History of nuclear stress test 08/09/2017    EF42%,mild to moderate inferior ischemia    History of nuclear stress test 10/11/2018    Normal EF 48%, ABN Medium size mild inferior ischemia, no change from 8/2017    Hx of echocardiogram 08/09/2017    normal,EF55-60%    HX OTHER MEDICAL     Foster care until age 11    Hyperlipidemia     Hypertension 2008    Osteoarthritis     Primarily affecting the low back    Prostatic hypertrophy, benign     S/P PTCA (percutaneous transluminal coronary angioplasty) 06/19/2018 6/19/18: Stent to LAD.  5/22/18: Stent to OM     Unspecified cerebral artery occlusion with cerebral infarction 1997    pt states he had a \"mini stroke\"\"I did not know it was  a stroke, but after I had a concussion they told me the scan showed I had had a stroke\"     Past Surgical History:   Procedure Laterality Date    CARDIOVASCULAR STRESS TEST  05/2017    inferior ischemia    CORONARY ANGIOPLASTY WITH STENT PLACEMENT  2017    60% stenosis    OTHER SURGICAL HISTORY  2012    thyroid biopsy--benign    SHOULDER ARTHROSCOPY Right 1992    R shoulder scope partial rot cuff repair     Family History   Problem Relation Age of Onset    Coronary Art Dis Father     Hypertension Father     Stroke Father     High Blood Pressure Father     Heart Attack Father 80           Conrad Seeds Breast Cancer Mother     Alzheimer's Disease Mother          80    Alcohol Abuse Brother     Cancer Brother         type unknown    Hypertension Brother     Learning Disabilities Brother     Heart Disease Brother     Heart Disease Brother      Social History     Tobacco Use    Smoking status: Light Tobacco Smoker     Packs/day: 0.25     Years: 30.00     Pack years: 7.50     Types: Cigars     Last attempt to quit: 2017     Years since quittin.7    Smokeless tobacco: Never Used   Substance Use Topics    Alcohol use: Not Currently     Comment: rarely        Review of Systems:   · Constitutional: No Fever or Weight Loss   · Eyes: No Decreased Vision  · ENT: No Headaches, Hearing Loss or Vertigo  · Cardiovascular: as per note above   · Respiratory: No cough or wheezing and as per note above.    · Gastrointestinal: No abdominal pain, appetite loss, blood in stools, constipation, diarrhea or heartburn  · Genitourinary: No dysuria, trouble voiding, or hematuria  · Musculoskeletal:  None  · Integumentary: No rash or pruritis  · Neurological: No TIA or stroke symptoms  · Psychiatric: No anxiety or depression  · Endocrine: No malaise, fatigue or temperature intolerance  · Hematologic/Lymphatic: No bleeding problems, blood clots or swollen lymph nodes  · Allergic/Immunologic: No nasal congestion or hives    Objective:      Physical Exam:  BP (!) 126/99   Pulse 75   Ht 6' (1.829 m)   Wt 257 lb 12.8 oz (116.9 kg)   BMI 34.96 kg/m²   Wt Readings from Last 3 Encounters:   21 257 lb 12.8 oz (116.9 kg)   21 250 lb (113.4 kg) 02/10/20 250 lb 6.4 oz (113.6 kg)     Body mass index is 34.96 kg/m². Vitals:    09/27/21 1227   BP: (!) 126/99   Pulse:         General Appearance:  No distress, conversant  Constitutional:  Well developed, Well nourished, No acute distress, Non-toxic appearance. HENT:  Normocephalic, Atraumatic, Bilateral external ears normal, Oropharynx moist, No oral exudates, Nose normal. Neck- Normal range of motion, No tenderness, Supple, No stridor,no apical-carotid delay  Eyes:  PERRL, EOMI, Conjunctiva normal, No discharge. Respiratory:  Normal breath sounds, No respiratory distress, No wheezing, No chest tenderness. ,no use of accessory muscles,   Cardiovascular: (PMI) apex non displaced,no lifts no thrills,S1 and S2 audible, No added heart sounds, No signs of ankle edema, or volume overload, No evidence of JVD  GI:  Bowel sounds normal, Soft, No tenderness, No masses, No gross visceromegaly   :  No costovertebral angle tenderness   Musculoskeletal:  No edema, no tenderness, no deformities.  Back- no tenderness  Integument:  Well hydrated, no rash   Lymphatic:  No lymphadenopathy noted   Neurologic:  Alert & oriented x 3, CN 2-12 normal, normal motor function, normal sensory function, no focal deficits noted   Psychiatric:  Speech and behavior appropriate       Medical decision making and Data review:  DATA:  Lab Results   Component Value Date    TROPONINT <0.010 08/25/2021     BNP:  No results found for: PROBNP  PT/INR:  No results found for: Advanced Image Enhancement [facility]  Lab Results   Component Value Date    LABA1C 5.1 08/25/2021    LABA1C 5.7 08/01/2019     Lab Results   Component Value Date    CHOL 206 (H) 08/25/2021    TRIG 118 08/25/2021    HDL 33 (L) 08/25/2021    LDLCALC 103 (H) 08/01/2019    LDLDIRECT 150 (H) 08/25/2021     Lab Results   Component Value Date    ALT 19 08/25/2021    AST 19 08/25/2021     TSH:   Lab Results   Component Value Date    TSH 1.08 10/22/2014   Stress test 6/1/17  Summary    Abnormal Wilian Dyana to moderate inferior wall Ischemia of a large area.    Dilated Cardiomyopathy with mildly reduced LV function.  LVEF 43 %.         Cath Conclusions 6/1/2017      Procedure Summary   s/p DAI to RCA for 90 % stenosis reduced to 0 % stenosis using a   Xience 2.7 X 32 stent followed by post stent balloon dilation   using a 2.75 X 15 balloon inflated to 20 sharon   LAD has mid 60 to 70 % stenosis   Circumflex is patent but distal OM branch has 90 % stenosis (   very small vessel )  LMCA: Normal.  LAD: Abnormal.mid segment has 50% to 60 % stenosis  LCx: Abnormal.distal OM 1 branch has 90 % stenosis, small calibre  RCA: Abnormal.mid RCA 90 % stenosis , proximal RCa has 20% to 30 % stenosis     Nuclear stress test 8/9/17  Abnormal study    Normal EF 42 % with normal ventricular contractility , normal wall motion    Medium size, Mild to moderate inferior ischemia noted .    Abnormal stress images but increased GI Uptake in stress images         Cath 8/6/17  Procedure Summary   1. Mild to Moderate 3 vessel CAD, except a tiny sub branch of   last OM which is sub totally occluded with left to left   collaterals.   2. RCA stent is widely patent.   3. LV angiography not done due to abnormal Cr. LMCA: Normal.  LAD: Single stenosis. 50 % mid vessel stenosis. LCx: Single stenosis. 50 % Ostial stenosis.     Lesion on 3rd Ob Shelly: Distal subsection. 99% stenosis. Pre procedure DB I    flow was noted.     Comments:Very small caliber sub branch with left to left collaterals   RCA: Focal stenosis. 40 % ostial stenosisThere is a previous stent on Mid RCA. There is a previous stent on Mid RCA Distal subsection showing wide patency.     Cath 5/23/18  Procedure Summary   severe RCA stenosis, moderate LAD stenosis, s/p PCI of mid RCA   and PTCA of distal PDA      Angiographic Findings     Cardiac Arteries and Lesion Findings   LMCA: Normal.  LAD: initially appearing stenosis at mid LAD improved after NTG and guide+ [employer]  wire injection, hence PCI aborted     Lesion on Mid LAD: 70% stenosis. LCx: Moderate Lumen Irregularity and Abnormal.distal OM1 has chronic subtotal  occlusion with collateral filling  RCA: mid RCA has 80% STENOSIS, distal PDA has diffuse stenosis and had wire  induced spasm,requiring PTCAThere is a previous stent on Mid RCA. There is a  previous stent on Mid RCA Distal subsection.     Lesion on Mid RCA: 90% stenosis.   Interventional procedure  Cardiac lesions  LAD:     Lesion on Mid LAD: 70% stenosis.  RCA:     Lesion on Mid RCA: 90% stenosis.      - 3.5 x 23 mm Xience Alpine Drug Eluting Stent. Diameter: 3.5 mm. Length:    23 mm. 3 inflation(s) to a max pressure of: 15 sharon.     Lesion on R PDA: 99% stenosis.    - 2.5 x 20 mm Trek RX Balloon. Diameter: 2.5 mm. Length: 20 mm. 5    inflation(s) to a max pressure of: 20 sharon.     Cath 6/9 18    Procedure Summary   Radial access, LVEDP was 12 mmHG   1. Successful PCI using 3.25 X 32 Xience stent for 80% mid LAD   lesion reduced to 0% stenosis. Ostial third diagonal was   pinched, but had DB-3 flow. Therefore, no further intervention   was done   2. RCA stents are patent   3. OM has proximal 50% stenosis   4. RV marginal has 70-80% stenosis     Angiographic Findings    Diagnostic Findings    Cardiac Arteries and Lesion Findings   LMCA: Normal (no stenosis %) and No Angiographicalyl Significant Disease. LAD: Abnormal.mid segment has 80 % lesion, 3 diagonal branches , 2nd diagonal  has 50 % stenosis. hIgh grade 80 to 90 % stenosis at 3rd diagonal take off     Lesion on Mid LAD: 80% stenosis. LCx: Abnormal.ostial 40 % stenosis and , 1st OM has 40 to 50 % stenosis  RCA: Abnormal.Mid RCA has a stent which is patent, RV marginal has 80 % lesion  , PAD is patent and distal PLB branch is occludedThere is a previous stent on  Mid RCA. All labs, medications and tests reviewed by myself including data and history from outside source , patient and available family . Assessment & Plan:      1. Depression, unspecified depression type    2. Chest pain, unspecified type    3. Coronary artery disease involving native coronary artery of native heart with unstable angina pectoris ([address]    4. ASCVD (arteriosclerotic cardiovascular disease)    5. Essential hypertension    6. Dyslipidemia    7. S/P PTCA (percutaneous transluminal coronary angioplasty)    8. Pain in both lower legs    9. Claudication ([address]    10. Pure hypercholesterolemia         Coronary artery disease of native artery of native heart with stable angina pectoris ([address]  H/O PCI to RCA initially in June 2017, he had repeat PCI for in stent restenosis  in May 2018 but he continued to have symptoms at work so  HE had LAD intervention in June 2018 stop effient , start plavix due to cost issues   We tried medical therapy extensively including Ranexa, metoprolol, Norvasc. After failing medical therapy. He presented again to the emergency department and had intervention done in May 2018. For now Continue DAPT for as long as possible  He was dizzy on ranexa? Imdur gave him headaches . He is on norvasc and metoprolol ( he doesnot want any furtehr med titration. Higher dose of metoprolol makes him tired. stress test  in Aug 2021 showed no ischemia but if he has more chest pain will plan cardiac cath    Depression  I had a long discussion with him encouraged  Him to talk to Dr Rosario Matthew about antidepressants and possible referral to psych , I offered him some meds as well he will think about it , HE denies any suicidal thoughts   Start zoloft 25 mg     Claudication  Arterial doppler  normal in Feb 2020     Essential hypertension  Restart amlodipine and , metoprolol , creatinine is 1.7 range will add small dose ace for renal protection he will see Nephrology next week   Says blood pressure is in 130's at home      Dyslipidemia :  Ginger Saunders had lab work recently,  Lipid profile was reviewed with patient. continue lipitor 40 mg     Counseled extensively and medication compliance urged. We discussed that for the  prevention of ASCVD our  goal is aggressive risk modification. Patient is encouraged to exercise even a brisk walk for 30 minutes  at least 3 to 4 times a week   Various goals were discussed and questions answered. Continue current medications. Appropriate prescriptions are addressed and refills ordered. Questions answered and patient verbalizes understanding. Call for any problems, questions, or concerns. Continue all other medications of all above medical condition listed as is. Return in about 3 months (around 12/27/2021).

## 2021-10-06 DIAGNOSIS — E78.00 PURE HYPERCHOLESTEROLEMIA: ICD-10-CM

## 2021-10-06 DIAGNOSIS — R35.0 URINARY FREQUENCY: ICD-10-CM

## 2021-10-06 DIAGNOSIS — E78.5 DYSLIPIDEMIA: ICD-10-CM

## 2021-10-06 DIAGNOSIS — I25.110 CORONARY ARTERY DISEASE INVOLVING NATIVE CORONARY ARTERY OF NATIVE HEART WITH UNSTABLE ANGINA PECTORIS (HCC): Primary | ICD-10-CM

## 2021-10-06 DIAGNOSIS — I73.9 CLAUDICATION (HCC): ICD-10-CM

## 2021-10-07 RX ORDER — AMLODIPINE BESYLATE 10 MG/1
10 TABLET ORAL DAILY
Qty: 90 TABLET | Refills: 1 | Status: SHIPPED | OUTPATIENT
Start: 2021-10-07 | End: 2022-07-26

## 2021-10-07 RX ORDER — FINASTERIDE 5 MG/1
5 TABLET, FILM COATED ORAL DAILY
Qty: 90 TABLET | Refills: 1 | Status: SHIPPED | OUTPATIENT
Start: 2021-10-07 | End: 2022-05-06 | Stop reason: SDUPTHER

## 2021-10-07 RX ORDER — ATORVASTATIN CALCIUM 40 MG/1
40 TABLET, FILM COATED ORAL DAILY
Qty: 90 TABLET | Refills: 1 | Status: ON HOLD | OUTPATIENT
Start: 2021-10-07 | End: 2022-07-20 | Stop reason: HOSPADM

## 2021-10-07 RX ORDER — CLOPIDOGREL BISULFATE 75 MG/1
75 TABLET ORAL DAILY
Qty: 90 TABLET | Refills: 1 | Status: ON HOLD | OUTPATIENT
Start: 2021-10-07 | End: 2022-07-20 | Stop reason: HOSPADM

## 2021-10-07 RX ORDER — METOPROLOL TARTRATE 50 MG/1
50 TABLET, FILM COATED ORAL 2 TIMES DAILY
Qty: 180 TABLET | Refills: 1 | Status: SHIPPED | OUTPATIENT
Start: 2021-10-07 | End: 2022-07-25

## 2022-01-11 ENCOUNTER — OFFICE VISIT (OUTPATIENT)
Dept: CARDIOLOGY CLINIC | Age: 62
End: 2022-01-11

## 2022-01-11 VITALS
HEART RATE: 60 BPM | DIASTOLIC BLOOD PRESSURE: 88 MMHG | WEIGHT: 259 LBS | HEIGHT: 72 IN | BODY MASS INDEX: 35.08 KG/M2 | SYSTOLIC BLOOD PRESSURE: 120 MMHG

## 2022-01-11 DIAGNOSIS — E78.5 DYSLIPIDEMIA: ICD-10-CM

## 2022-01-11 DIAGNOSIS — I25.110 CORONARY ARTERY DISEASE INVOLVING NATIVE CORONARY ARTERY OF NATIVE HEART WITH UNSTABLE ANGINA PECTORIS (HCC): ICD-10-CM

## 2022-01-11 DIAGNOSIS — Z98.61 S/P PTCA (PERCUTANEOUS TRANSLUMINAL CORONARY ANGIOPLASTY): ICD-10-CM

## 2022-01-11 DIAGNOSIS — I10 ESSENTIAL HYPERTENSION: ICD-10-CM

## 2022-01-11 DIAGNOSIS — I73.9 CLAUDICATION (HCC): ICD-10-CM

## 2022-01-11 DIAGNOSIS — E78.00 PURE HYPERCHOLESTEROLEMIA: Primary | ICD-10-CM

## 2022-01-11 DIAGNOSIS — R07.2 PRECORDIAL PAIN: ICD-10-CM

## 2022-01-11 DIAGNOSIS — I25.118 CORONARY ARTERY DISEASE OF NATIVE ARTERY OF NATIVE HEART WITH STABLE ANGINA PECTORIS (HCC): ICD-10-CM

## 2022-01-11 DIAGNOSIS — I25.10 ASCVD (ARTERIOSCLEROTIC CARDIOVASCULAR DISEASE): ICD-10-CM

## 2022-01-11 PROCEDURE — 99214 OFFICE O/P EST MOD 30 MIN: CPT | Performed by: INTERNAL MEDICINE

## 2022-01-11 RX ORDER — SERTRALINE HYDROCHLORIDE 25 MG/1
25 TABLET, FILM COATED ORAL DAILY
Qty: 90 TABLET | Refills: 3 | Status: SHIPPED | OUTPATIENT
Start: 2022-01-11 | End: 2022-08-11

## 2022-01-11 NOTE — PROGRESS NOTES
CARDIOLOGY  NOTE  Chief Complaint: Follow-up foe ASCVD. HPI:   Juany Mota is a 57y.o. year old who has history as noted below. HE has been having Waylan Phoenix which has helped with his anxiety, his sister is a herbalist who is getting it for him. He has a rash on right ankle for which he is using cortisone rash,HE has not seen dermatology ,He stopped zoloft after he ran out of it but would like to restart it and asking for prescription    He denies  chest  pain He is back on his heart medication Last time I saw him he was depressed , brownies are helping him a lot   He says chest pain episodes are better , He has used nitro twice in last 2 mths     Juany Mota was  Initially seen for nstemi and had  RCA intervention in June 2017  concerning for unstable angina. He had repeat intervention of  In stent restenosis the right coronary artery and POBA of PDA  in May 2018, stress test in Aug 2012 showed no ischemia so we continued medical mangement for chest pain and angina  HE has had RCA intervention twice  since 2017 . He had LAD intervention in June 2018 .  Compliance and depression have danelle a concern      Current Outpatient Medications   Medication Sig Dispense Refill    sertraline (ZOLOFT) 25 MG tablet Take 1 tablet by mouth daily 90 tablet 3    clopidogrel (PLAVIX) 75 MG tablet Take 1 tablet by mouth daily 90 tablet 1    finasteride (PROSCAR) 5 MG tablet Take 1 tablet by mouth daily 90 tablet 1    amLODIPine (NORVASC) 10 MG tablet Take 1 tablet by mouth daily Hold if Systolic BP <383 90 tablet 1    metoprolol tartrate (LOPRESSOR) 50 MG tablet Take 1 tablet by mouth 2 times daily Hold if SBP < 110 and HR <65 call cardiology 180 tablet 1    atorvastatin (LIPITOR) 40 MG tablet Take 1 tablet by mouth daily 90 tablet 1    lisinopril (PRINIVIL;ZESTRIL) 5 MG tablet Take 1 tablet by mouth daily 90 tablet 1    aspirin 81 MG tablet Take 1 tablet by mouth daily 30 tablet 0    Multiple Vitamins-Minerals (MENS MULTIPLE VITAMIN/LYCOPENE) TABS Take  by mouth.  cloNIDine (CATAPRES) 0.1 MG tablet Take 1 tablet by mouth 2 times daily (Patient not taking: Reported on 1/11/2022) 60 tablet 0    nitroGLYCERIN (NITROSTAT) 0.4 MG SL tablet up to max of 3 total doses. If no relief after 1 dose, call 911. (Patient not taking: Reported on 1/11/2022) 25 tablet 0     No current facility-administered medications for this visit. Allergies:   Patient has no known allergies. Patient History:  Past Medical History:   Diagnosis Date    Blood transfusion 2010    CAD (coronary artery disease)     Chest pain     GERD (gastroesophageal reflux disease)     H/O Doppler lower arterial ultrasound 02/13/2020     No evidence of significant occlusive arterial disease.  H/O Doppler lower venous ultrasound 08/20/2019     No evidence of significant venous insufficiency noted in the bilateral lower, no DVT,SVT.    H/O exercise stress test 05/31/2018    treadmill    H/O exercise stress test 07/23/2018 7/2018: treadmill, No Ischemia noted pt can start rehab.     History of exercise stress test 06/19/2017    treadmill    History of exercise stress test 06/27/2017    treadmill    History of GI bleed 2010    History of nuclear stress test 08/09/2017    EF42%,mild to moderate inferior ischemia    History of nuclear stress test 10/11/2018    Normal EF 48%, ABN Medium size mild inferior ischemia, no change from 8/2017    Hx of echocardiogram 08/09/2017    normal,EF55-60%    HX OTHER MEDICAL     [facility] care until age 14    Hyperlipidemia     Hypertension 2008    Osteoarthritis     Primarily affecting the low back    Prostatic hypertrophy, benign     S/P PTCA (percutaneous transluminal coronary angioplasty) 06/19/2018 6/19/18: Stent to LAD.  5/22/18: Stent to OM     Unspecified cerebral artery occlusion with cerebral infarction 1997    pt states he had a \"mini stroke\"\"I did not know it was  a stroke, but after I had a concussion they told me the scan showed I had had a stroke\"     Past Surgical History:   Procedure Laterality Date    CARDIOVASCULAR STRESS TEST  2017    inferior ischemia    CORONARY ANGIOPLASTY WITH STENT PLACEMENT  2017    60% stenosis    OTHER SURGICAL HISTORY  2012    thyroid biopsy--benign    SHOULDER ARTHROSCOPY Right 1992    R shoulder scope partial rot cuff repair     Family History   Problem Relation Age of Onset    Coronary Art Dis Father     Hypertension Father     Stroke Father     High Blood Pressure Father     Heart Attack Father 80           Rolon Breast Cancer Mother     Alzheimer's Disease Mother          80    Alcohol Abuse Brother     Cancer Brother         type unknown    Hypertension Brother     Learning Disabilities Brother     Heart Disease Brother     Heart Disease Brother      Social History     Tobacco Use    Smoking status: Light Tobacco Smoker     Packs/day: 0.25     Years: 30.00     Pack years: 7.50     Types: Cigars     Last attempt to quit: 2017     Years since quittin.0    Smokeless tobacco: Never Used   Substance Use Topics    Alcohol use: Not Currently     Comment: rarely        Review of Systems:   · Constitutional: No Fever or Weight Loss   · Eyes: No Decreased Vision  · ENT: No Headaches, Hearing Loss or Vertigo  · Cardiovascular: as per note above   · Respiratory: No cough or wheezing and as per note above.    · Gastrointestinal: No abdominal pain, appetite loss, blood in stools, constipation, diarrhea or heartburn  · Genitourinary: No dysuria, trouble voiding, or hematuria  · Musculoskeletal:  None  · Integumentary: No rash or pruritis  · Neurological: No TIA or stroke symptoms  · Psychiatric: No anxiety or depression  · Endocrine: No malaise, fatigue or temperature intolerance  · Hematologic/Lymphatic: No bleeding problems, blood clots or swollen lymph nodes  · Allergic/Immunologic: No nasal congestion or hives    Objective:      Physical Exam:  /88   Pulse 60   Ht 6' (1.829 m)   Wt 259 lb (117.5 kg)   BMI 35.13 kg/m²   Wt Readings from Last 3 Encounters:   01/11/22 259 lb (117.5 kg)   09/27/21 257 lb 12.8 oz (116.9 kg)   08/25/21 250 lb (113.4 kg)     Body mass index is 35.13 kg/m². Vitals:    01/11/22 1239   BP: 120/88   Pulse: 60        General Appearance:  No distress, conversant  Constitutional:  Well developed, Well nourished, No acute distress, Non-toxic appearance. HENT:  Normocephalic, Atraumatic, Bilateral external ears normal, Oropharynx moist, No oral exudates, Nose normal. Neck- Normal range of motion, No tenderness, Supple, No stridor,no apical-carotid delay  Eyes:  PERRL, EOMI, Conjunctiva normal, No discharge. Respiratory:  Normal breath sounds, No respiratory distress, No wheezing, No chest tenderness. ,no use of accessory muscles,   Cardiovascular: (PMI) apex non displaced,no lifts no thrills,S1 and S2 audible, No added heart sounds, No signs of ankle edema, or volume overload, No evidence of JVD  GI:  Bowel sounds normal, Soft, No tenderness, No masses, No gross visceromegaly   :  No costovertebral angle tenderness   Musculoskeletal:  No edema, no tenderness, no deformities.  Back- no tenderness  Integument:  Well hydrated, no rash   Lymphatic:  No lymphadenopathy noted   Neurologic:  Alert & oriented x 3, CN 2-12 normal, normal motor function, normal sensory function, no focal deficits noted   Psychiatric:  Speech and behavior appropriate       Medical decision making and Data review:  DATA:  Lab Results   Component Value Date    TROPONINT <0.010 08/25/2021     BNP:  No results found for: PROBNP  PT/INR:  No results found for: [employer]  Lab Results   Component Value Date    LABA1C 5.1 08/25/2021    LABA1C 5.7 08/01/2019     Lab Results   Component Value Date    CHOL 206 (H) 08/25/2021    TRIG 118 08/25/2021    HDL 33 (L) 08/25/2021    LDLCALC 103 (H) 08/01/2019    LDLDIRECT 150 (H) 08/25/2021     Lab Results   Component Value Date    ALT 19 08/25/2021    AST 19 08/25/2021     TSH:   Lab Results   Component Value Date    TSH 1.08 10/22/2014   Stress test 6/1/17  Summary    Abnormal Study.    Mild to moderate inferior wall Ischemia of a large area.    Dilated Cardiomyopathy with mildly reduced LV function.  LVEF 43 %.         Cath Conclusions 6/1/2017      Procedure Summary   s/p DAI to RCA for 90 % stenosis reduced to 0 % stenosis using a   Xience 2.7 X 32 stent followed by post stent balloon dilation   using a 2.75 X 15 balloon inflated to 20 sharon   LAD has mid 60 to 70 % stenosis   Circumflex is patent but distal OM branch has 90 % stenosis (   very small vessel )  LMCA: Normal.  LAD: Abnormal.mid segment has 50% to 60 % stenosis  LCx: Abnormal.distal OM 1 branch has 90 % stenosis, small calibre  RCA: Abnormal.mid RCA 90 % stenosis , proximal RCa has 20% to 30 % stenosis     Nuclear stress test 8/9/17  Abnormal study    Normal EF 42 % with normal ventricular contractility , normal wall motion    Medium size, Mild to moderate inferior ischemia noted .    Abnormal stress images but increased GI Uptake in stress images         Cath 8/6/17  Procedure Summary   1. Mild to Moderate 3 vessel CAD, except a tiny sub branch of   last OM which is sub totally occluded with left to left   collaterals.   2. RCA stent is widely patent.   3. LV angiography not done due to abnormal Cr. LMCA: Normal.  LAD: Single stenosis. 50 % mid vessel stenosis. LCx: Single stenosis. 50 % Ostial stenosis.     Lesion on 3rd Ob Shelly: Distal subsection. 99% stenosis. Pre procedure DB I    flow was noted.     Comments:Very small caliber sub branch with left to left collaterals   RCA: Focal stenosis. 40 % ostial stenosisThere is a previous stent on Mid RCA. There is a previous stent on Mid RCA Distal subsection showing wide patency.     Cath 5/23/18  Procedure Summary   severe RCA stenosis, moderate LAD stenosis, s/p PCI of mid RCA   and PTCA of distal PDA      Angiographic Findings     Cardiac Arteries and Lesion Findings   LMCA: Normal.  LAD: initially appearing stenosis at mid LAD improved after NTG and guide+ [employer]  wire injection, hence PCI aborted     Lesion on Mid LAD: 70% stenosis. LCx: Moderate Lumen Irregularity and Abnormal.distal OM1 has chronic subtotal  occlusion with collateral filling  RCA: mid RCA has 80% STENOSIS, distal PDA has diffuse stenosis and had wire  induced spasm,requiring PTCAThere is a previous stent on Mid RCA. There is a  previous stent on Mid RCA Distal subsection.     Lesion on Mid RCA: 90% stenosis.   Interventional procedure  Cardiac lesions  LAD:     Lesion on Mid LAD: 70% stenosis.  RCA:     Lesion on Mid RCA: 90% stenosis.      - 3.5 x 23 mm Xience Alpine Drug Eluting Stent. Diameter: 3.5 mm. Length:    23 mm. 3 inflation(s) to a max pressure of: 15 sharon.     Lesion on R PDA: 99% stenosis.    - 2.5 x 20 mm Trek RX Balloon. Diameter: 2.5 mm. Length: 20 mm. 5    inflation(s) to a max pressure of: 20 sharon.     Cath 6/9 18    Procedure Summary   Radial access, LVEDP was 12 mmHG   1. Successful PCI using 3.25 X 32 Xience stent for 80% mid LAD   lesion reduced to 0% stenosis. Ostial third diagonal was   pinched, but had DB-3 flow. Therefore, no further intervention   was done   2. RCA stents are patent   3. OM has proximal 50% stenosis   4. RV marginal has 70-80% stenosis     Angiographic Findings    Diagnostic Findings    Cardiac Arteries and Lesion Findings   LMCA: Normal (no stenosis %) and No Angiographicalyl Significant Disease. LAD: Abnormal.mid segment has 80 % lesion, 3 diagonal branches , 2nd diagonal  has 50 % stenosis. hIgh grade 80 to 90 % stenosis at 3rd diagonal take off     Lesion on Mid LAD: 80% stenosis.   LCx: Abnormal.ostial 40 % stenosis and , 1st OM has 40 to 50 % stenosis  RCA: Abnormal.Mid RCA has a stent which is patent, RV marginal has 80 % lesion  , PAD is patent and distal PLB branch is occludedThere is a previous stent on  Mid RCA. All labs, medications and tests reviewed by myself including data and history from outside source , patient and available family . Assessment & Plan:      1. Pure hypercholesterolemia    2. Coronary artery disease involving native coronary artery of native heart with unstable angina pectoris ([address]    3. ASCVD (arteriosclerotic cardiovascular disease)    4. Essential hypertension    5. Coronary artery disease of native artery of native heart with stable angina pectoris ([address]    6. Dyslipidemia    7. S/P PTCA (percutaneous transluminal coronary angioplasty)    8. Claudication ([address]    9. Precordial pain         Coronary artery disease of native artery of native heart with stable angina pectoris [facility])  H/O PCI to RCA initially in June 2017, he had repeat PCI for in stent restenosis  in May 2018 but he continued to have symptoms at work so  HE had LAD intervention in June 2018 . still on plavix and aspirin  We continued  medical therapy with  metoprolol, Norvasc. After failing medical therapy. He presented again to the emergency department and had intervention done in May 2018. For now Continue DAPT for as long as possible  He was dizzy on ranexa? Imdur gave him headaches . He is on norvasc and metoprolol ( he doesnot want any furtehr med titration. Higher dose of metoprolol makes him tired. stress test  in Aug 2021 showed no ischemia but if he has more chest pain will plan cardiac cath    Depression  I had a long discussion with him encouraged  Him to talk to Dr Davonte Tripathi about antidepressants and possible referral to psych , HE denies any suicidal thoughts   I Started  Him on  zoloft 25 mg in Sept 2021  but he ran out of it last month and he has not continued it since then   He is asking for restarting it so I ordered it but asked him to see Dr Davonte Tripathi and get her approval to continue    Claudication  Arterial doppler  normal in Feb 2020     Essential hypertension  Restart amlodipine and , metoprolol , creatinine is 1.7 range will add small dose ace for renal protection he will see Nephrology next week   Says blood pressure is in 130's at home      Dyslipidemia :  Ramona Chandler had lab work recently,  Lipid profile was reviewed with patient. continue lipitor 40 mg     Counseled extensively and medication compliance urged. We discussed that for the  prevention of ASCVD our  goal is aggressive risk modification. Patient is encouraged to exercise even a brisk walk for 30 minutes  at least 3 to 4 times a week   Various goals were discussed and questions answered. Continue current medications. Appropriate prescriptions are addressed and refills ordered. Questions answered and patient verbalizes understanding. Call for any problems, questions, or concerns. Continue all other medications of all above medical condition listed as is. Return in about 6 months (around 7/11/2022).

## 2022-05-02 DIAGNOSIS — I73.9 CLAUDICATION (HCC): ICD-10-CM

## 2022-05-02 DIAGNOSIS — E78.00 PURE HYPERCHOLESTEROLEMIA: ICD-10-CM

## 2022-05-02 DIAGNOSIS — E78.5 DYSLIPIDEMIA: ICD-10-CM

## 2022-05-02 DIAGNOSIS — I25.110 CORONARY ARTERY DISEASE INVOLVING NATIVE CORONARY ARTERY OF NATIVE HEART WITH UNSTABLE ANGINA PECTORIS (HCC): ICD-10-CM

## 2022-05-02 RX ORDER — FINASTERIDE 5 MG/1
TABLET, FILM COATED ORAL
Qty: 90 TABLET | Refills: 0 | OUTPATIENT
Start: 2022-05-02

## 2022-05-05 DIAGNOSIS — E78.00 PURE HYPERCHOLESTEROLEMIA: ICD-10-CM

## 2022-05-05 DIAGNOSIS — I73.9 CLAUDICATION (HCC): ICD-10-CM

## 2022-05-05 DIAGNOSIS — I25.110 CORONARY ARTERY DISEASE INVOLVING NATIVE CORONARY ARTERY OF NATIVE HEART WITH UNSTABLE ANGINA PECTORIS (HCC): ICD-10-CM

## 2022-05-05 DIAGNOSIS — E78.5 DYSLIPIDEMIA: ICD-10-CM

## 2022-05-05 RX ORDER — FINASTERIDE 5 MG/1
TABLET, FILM COATED ORAL
Qty: 90 TABLET | Refills: 0 | OUTPATIENT
Start: 2022-05-05

## 2022-05-06 DIAGNOSIS — I73.9 CLAUDICATION (HCC): ICD-10-CM

## 2022-05-06 DIAGNOSIS — I25.110 CORONARY ARTERY DISEASE INVOLVING NATIVE CORONARY ARTERY OF NATIVE HEART WITH UNSTABLE ANGINA PECTORIS (HCC): ICD-10-CM

## 2022-05-06 DIAGNOSIS — E78.5 DYSLIPIDEMIA: ICD-10-CM

## 2022-05-06 DIAGNOSIS — E78.00 PURE HYPERCHOLESTEROLEMIA: ICD-10-CM

## 2022-05-06 RX ORDER — FINASTERIDE 5 MG/1
5 TABLET, FILM COATED ORAL DAILY
Qty: 90 TABLET | Refills: 1 | Status: SHIPPED | OUTPATIENT
Start: 2022-05-06 | End: 2022-07-25 | Stop reason: SDUPTHER

## 2022-07-18 ENCOUNTER — APPOINTMENT (OUTPATIENT)
Dept: GENERAL RADIOLOGY | Age: 62
DRG: 251 | End: 2022-07-18

## 2022-07-18 ENCOUNTER — HOSPITAL ENCOUNTER (INPATIENT)
Age: 62
LOS: 2 days | Discharge: HOME | DRG: 251 | End: 2022-07-20
Attending: EMERGENCY MEDICINE | Admitting: STUDENT IN AN ORGANIZED HEALTH CARE EDUCATION/TRAINING PROGRAM

## 2022-07-18 DIAGNOSIS — R07.9 CHEST PAIN, UNSPECIFIED TYPE: Primary | ICD-10-CM

## 2022-07-18 DIAGNOSIS — I10 UNCONTROLLED HYPERTENSION: ICD-10-CM

## 2022-07-18 DIAGNOSIS — I21.3 ST ELEVATION MYOCARDIAL INFARCTION (STEMI), UNSPECIFIED ARTERY (HCC): ICD-10-CM

## 2022-07-18 PROBLEM — I20.0 UNSTABLE ANGINA (HCC): Status: ACTIVE | Noted: 2022-07-18

## 2022-07-18 PROCEDURE — 99285 EMERGENCY DEPT VISIT HI MDM: CPT

## 2022-07-18 PROCEDURE — 71045 X-RAY EXAM CHEST 1 VIEW: CPT

## 2022-07-18 PROCEDURE — 6360000004 HC RX CONTRAST MEDICATION

## 2022-07-18 PROCEDURE — 84484 ASSAY OF TROPONIN QUANT: CPT

## 2022-07-18 PROCEDURE — 6370000000 HC RX 637 (ALT 250 FOR IP): Performed by: EMERGENCY MEDICINE

## 2022-07-18 PROCEDURE — 85610 PROTHROMBIN TIME: CPT

## 2022-07-18 PROCEDURE — 6360000002 HC RX W HCPCS

## 2022-07-18 PROCEDURE — 85025 COMPLETE CBC W/AUTO DIFF WBC: CPT

## 2022-07-18 PROCEDURE — 2000000000 HC ICU R&B

## 2022-07-18 PROCEDURE — 93005 ELECTROCARDIOGRAM TRACING: CPT | Performed by: EMERGENCY MEDICINE

## 2022-07-18 PROCEDURE — 83690 ASSAY OF LIPASE: CPT

## 2022-07-18 PROCEDURE — 6370000000 HC RX 637 (ALT 250 FOR IP)

## 2022-07-18 PROCEDURE — 82248 BILIRUBIN DIRECT: CPT

## 2022-07-18 PROCEDURE — 85730 THROMBOPLASTIN TIME PARTIAL: CPT

## 2022-07-18 PROCEDURE — 80053 COMPREHEN METABOLIC PANEL: CPT

## 2022-07-18 PROCEDURE — 2500000003 HC RX 250 WO HCPCS

## 2022-07-18 RX ORDER — ASPIRIN 81 MG/1
162 TABLET, CHEWABLE ORAL ONCE
Status: COMPLETED | OUTPATIENT
Start: 2022-07-18 | End: 2022-07-18

## 2022-07-18 RX ORDER — NITROGLYCERIN 0.4 MG/1
0.4 TABLET SUBLINGUAL ONCE
Status: COMPLETED | OUTPATIENT
Start: 2022-07-18 | End: 2022-07-18

## 2022-07-18 RX ADMIN — NITROGLYCERIN 0.4 MG: 0.4 TABLET, ORALLY DISINTEGRATING SUBLINGUAL at 23:33

## 2022-07-18 RX ADMIN — ASPIRIN 81 MG CHEWABLE TABLET 162 MG: 81 TABLET CHEWABLE at 23:32

## 2022-07-18 ASSESSMENT — PAIN SCALES - GENERAL: PAINLEVEL_OUTOF10: 7

## 2022-07-19 ENCOUNTER — APPOINTMENT (OUTPATIENT)
Dept: ULTRASOUND IMAGING | Age: 62
DRG: 251 | End: 2022-07-19

## 2022-07-19 LAB
ACTIVATED CLOTTING TIME, LOW RANGE: 252 SEC
ACTIVATED CLOTTING TIME, LOW RANGE: 341 SEC
ALBUMIN SERPL-MCNC: 3.7 GM/DL (ref 3.4–5)
ALBUMIN SERPL-MCNC: 4.5 GM/DL (ref 3.4–5)
ALP BLD-CCNC: 105 IU/L (ref 40–129)
ALP BLD-CCNC: 98 IU/L (ref 40–128)
ALT SERPL-CCNC: 35 U/L (ref 10–40)
ALT SERPL-CCNC: 44 U/L (ref 10–40)
ANION GAP SERPL CALCULATED.3IONS-SCNC: 12 MMOL/L (ref 4–16)
ANION GAP SERPL CALCULATED.3IONS-SCNC: 9 MMOL/L (ref 4–16)
APTT: 26.4 SECONDS (ref 25.1–37.1)
AST SERPL-CCNC: 37 IU/L (ref 15–37)
AST SERPL-CCNC: 39 IU/L (ref 15–37)
BACTERIA: ABNORMAL /HPF
BASOPHILS ABSOLUTE: 0 K/CU MM
BASOPHILS RELATIVE PERCENT: 0.4 % (ref 0–1)
BILIRUB SERPL-MCNC: 0.3 MG/DL (ref 0–1)
BILIRUB SERPL-MCNC: 0.3 MG/DL (ref 0–1)
BILIRUBIN DIRECT: 0.2 MG/DL (ref 0–0.3)
BILIRUBIN URINE: NEGATIVE MG/DL
BILIRUBIN, INDIRECT: 0.1 MG/DL (ref 0–0.7)
BLOOD, URINE: ABNORMAL
BUN BLDV-MCNC: 18 MG/DL (ref 6–23)
BUN BLDV-MCNC: 18 MG/DL (ref 6–23)
CALCIUM SERPL-MCNC: 8.6 MG/DL (ref 8.3–10.6)
CALCIUM SERPL-MCNC: 9.2 MG/DL (ref 8.3–10.6)
CHLORIDE BLD-SCNC: 104 MMOL/L (ref 99–110)
CHLORIDE BLD-SCNC: 108 MMOL/L (ref 99–110)
CHOLESTEROL: 136 MG/DL
CLARITY: CLEAR
CO2: 24 MMOL/L (ref 21–32)
CO2: 25 MMOL/L (ref 21–32)
COLOR: YELLOW
CREAT SERPL-MCNC: 1.7 MG/DL (ref 0.9–1.3)
CREAT SERPL-MCNC: 1.9 MG/DL (ref 0.9–1.3)
CREATININE URINE: 74.1 MG/DL (ref 39–259)
DIFFERENTIAL TYPE: ABNORMAL
EKG ATRIAL RATE: 51 BPM
EKG ATRIAL RATE: 73 BPM
EKG DIAGNOSIS: NORMAL
EKG DIAGNOSIS: NORMAL
EKG P AXIS: 52 DEGREES
EKG P AXIS: 62 DEGREES
EKG P-R INTERVAL: 144 MS
EKG P-R INTERVAL: 150 MS
EKG Q-T INTERVAL: 372 MS
EKG Q-T INTERVAL: 474 MS
EKG QRS DURATION: 88 MS
EKG QRS DURATION: 92 MS
EKG QTC CALCULATION (BAZETT): 409 MS
EKG QTC CALCULATION (BAZETT): 436 MS
EKG R AXIS: 28 DEGREES
EKG R AXIS: 33 DEGREES
EKG T AXIS: 100 DEGREES
EKG T AXIS: 106 DEGREES
EKG VENTRICULAR RATE: 51 BPM
EKG VENTRICULAR RATE: 73 BPM
EOSINOPHILS ABSOLUTE: 0.3 K/CU MM
EOSINOPHILS RELATIVE PERCENT: 2.8 % (ref 0–3)
ESTIMATED AVERAGE GLUCOSE: 117 MG/DL
GFR AFRICAN AMERICAN: 44 ML/MIN/1.73M2
GFR AFRICAN AMERICAN: 50 ML/MIN/1.73M2
GFR NON-AFRICAN AMERICAN: 36 ML/MIN/1.73M2
GFR NON-AFRICAN AMERICAN: 41 ML/MIN/1.73M2
GLUCOSE BLD-MCNC: 107 MG/DL (ref 70–99)
GLUCOSE BLD-MCNC: 135 MG/DL (ref 70–99)
GLUCOSE, URINE: NEGATIVE MG/DL
HBA1C MFR BLD: 5.7 % (ref 4.2–6.3)
HCT VFR BLD CALC: 44.6 % (ref 42–52)
HCT VFR BLD CALC: 51.4 % (ref 42–52)
HDLC SERPL-MCNC: 31 MG/DL
HEMOGLOBIN: 14.5 GM/DL (ref 13.5–18)
HEMOGLOBIN: 17.1 GM/DL (ref 13.5–18)
IMMATURE NEUTROPHIL %: 0.2 % (ref 0–0.43)
INR BLD: 0.96 INDEX
KETONES, URINE: NEGATIVE MG/DL
LDL CHOLESTEROL CALCULATED: 69 MG/DL
LEUKOCYTE ESTERASE, URINE: NEGATIVE
LIPASE: 57 IU/L (ref 13–60)
LV EF: 50 %
LVEF MODALITY: NORMAL
LYMPHOCYTES ABSOLUTE: 2.7 K/CU MM
LYMPHOCYTES RELATIVE PERCENT: 30 % (ref 24–44)
MAGNESIUM: 1.8 MG/DL (ref 1.8–2.4)
MCH RBC QN AUTO: 28.4 PG (ref 27–31)
MCH RBC QN AUTO: 29.1 PG (ref 27–31)
MCHC RBC AUTO-ENTMCNC: 32.5 % (ref 32–36)
MCHC RBC AUTO-ENTMCNC: 33.3 % (ref 32–36)
MCV RBC AUTO: 87.5 FL (ref 78–100)
MCV RBC AUTO: 87.6 FL (ref 78–100)
MONOCYTES ABSOLUTE: 0.9 K/CU MM
MONOCYTES RELATIVE PERCENT: 10.2 % (ref 0–4)
MUCUS: ABNORMAL HPF
NITRITE URINE, QUANTITATIVE: POSITIVE
NUCLEATED RBC %: 0 %
PDW BLD-RTO: 18.2 % (ref 11.7–14.9)
PDW BLD-RTO: 18.9 % (ref 11.7–14.9)
PH, URINE: 7.5 (ref 5–8)
PHOSPHORUS: 2.9 MG/DL (ref 2.5–4.9)
PLATELET # BLD: 226 K/CU MM (ref 140–440)
PLATELET # BLD: 242 K/CU MM (ref 140–440)
PMV BLD AUTO: 10.4 FL (ref 7.5–11.1)
PMV BLD AUTO: 10.9 FL (ref 7.5–11.1)
POTASSIUM SERPL-SCNC: 3.9 MMOL/L (ref 3.5–5.1)
POTASSIUM SERPL-SCNC: 4.2 MMOL/L (ref 3.5–5.1)
PROT/CREAT RATIO, UR: 0.2
PROTEIN UA: NEGATIVE MG/DL
PROTHROMBIN TIME: 12.4 SECONDS (ref 11.7–14.5)
RBC # BLD: 5.1 M/CU MM (ref 4.6–6.2)
RBC # BLD: 5.87 M/CU MM (ref 4.6–6.2)
RBC URINE: 7 /HPF (ref 0–3)
SEGMENTED NEUTROPHILS ABSOLUTE COUNT: 5.1 K/CU MM
SEGMENTED NEUTROPHILS RELATIVE PERCENT: 56.4 % (ref 36–66)
SODIUM BLD-SCNC: 141 MMOL/L (ref 135–145)
SODIUM BLD-SCNC: 141 MMOL/L (ref 135–145)
SODIUM URINE: 165 MMOL/L (ref 35–167)
SPECIFIC GRAVITY UA: 1.01 (ref 1–1.03)
TOTAL IMMATURE NEUTOROPHIL: 0.02 K/CU MM
TOTAL NUCLEATED RBC: 0 K/CU MM
TOTAL PROTEIN: 6.3 GM/DL (ref 6.4–8.2)
TOTAL PROTEIN: 8.2 GM/DL (ref 6.4–8.2)
TRICHOMONAS: ABNORMAL /HPF
TRIGL SERPL-MCNC: 180 MG/DL
TROPONIN T: 0.01 NG/ML
URINE TOTAL PROTEIN: 13.5 MG/DL
UROBILINOGEN, URINE: 0.2 MG/DL (ref 0.2–1)
WBC # BLD: 7.3 K/CU MM (ref 4–10.5)
WBC # BLD: 9 K/CU MM (ref 4–10.5)
WBC UA: 11 /HPF (ref 0–2)

## 2022-07-19 PROCEDURE — 6360000002 HC RX W HCPCS: Performed by: STUDENT IN AN ORGANIZED HEALTH CARE EDUCATION/TRAINING PROGRAM

## 2022-07-19 PROCEDURE — 02703ZZ DILATION OF CORONARY ARTERY, ONE ARTERY, PERCUTANEOUS APPROACH: ICD-10-PCS | Performed by: INTERNAL MEDICINE

## 2022-07-19 PROCEDURE — 84300 ASSAY OF URINE SODIUM: CPT

## 2022-07-19 PROCEDURE — 99255 IP/OBS CONSLTJ NEW/EST HI 80: CPT | Performed by: INTERNAL MEDICINE

## 2022-07-19 PROCEDURE — 80053 COMPREHEN METABOLIC PANEL: CPT

## 2022-07-19 PROCEDURE — 82570 ASSAY OF URINE CREATININE: CPT

## 2022-07-19 PROCEDURE — 83735 ASSAY OF MAGNESIUM: CPT

## 2022-07-19 PROCEDURE — 36415 COLL VENOUS BLD VENIPUNCTURE: CPT

## 2022-07-19 PROCEDURE — 6370000000 HC RX 637 (ALT 250 FOR IP): Performed by: INTERNAL MEDICINE

## 2022-07-19 PROCEDURE — 80061 LIPID PANEL: CPT

## 2022-07-19 PROCEDURE — 92928 PRQ TCAT PLMT NTRAC ST 1 LES: CPT | Performed by: INTERNAL MEDICINE

## 2022-07-19 PROCEDURE — 93010 ELECTROCARDIOGRAM REPORT: CPT | Performed by: INTERNAL MEDICINE

## 2022-07-19 PROCEDURE — 6370000000 HC RX 637 (ALT 250 FOR IP): Performed by: STUDENT IN AN ORGANIZED HEALTH CARE EDUCATION/TRAINING PROGRAM

## 2022-07-19 PROCEDURE — 93458 L HRT ARTERY/VENTRICLE ANGIO: CPT

## 2022-07-19 PROCEDURE — 84100 ASSAY OF PHOSPHORUS: CPT

## 2022-07-19 PROCEDURE — C1760 CLOSURE DEV, VASC: HCPCS

## 2022-07-19 PROCEDURE — C1725 CATH, TRANSLUMIN NON-LASER: HCPCS

## 2022-07-19 PROCEDURE — 51798 US URINE CAPACITY MEASURE: CPT

## 2022-07-19 PROCEDURE — 85027 COMPLETE CBC AUTOMATED: CPT

## 2022-07-19 PROCEDURE — 2000000000 HC ICU R&B

## 2022-07-19 PROCEDURE — C1874 STENT, COATED/COV W/DEL SYS: HCPCS

## 2022-07-19 PROCEDURE — 85347 COAGULATION TIME ACTIVATED: CPT

## 2022-07-19 PROCEDURE — B2111ZZ FLUOROSCOPY OF MULTIPLE CORONARY ARTERIES USING LOW OSMOLAR CONTRAST: ICD-10-PCS | Performed by: INTERNAL MEDICINE

## 2022-07-19 PROCEDURE — 82270 OCCULT BLOOD FECES: CPT

## 2022-07-19 PROCEDURE — 2580000003 HC RX 258: Performed by: INTERNAL MEDICINE

## 2022-07-19 PROCEDURE — 2500000003 HC RX 250 WO HCPCS

## 2022-07-19 PROCEDURE — 83036 HEMOGLOBIN GLYCOSYLATED A1C: CPT

## 2022-07-19 PROCEDURE — 6360000004 HC RX CONTRAST MEDICATION

## 2022-07-19 PROCEDURE — C1887 CATHETER, GUIDING: HCPCS

## 2022-07-19 PROCEDURE — 93306 TTE W/DOPPLER COMPLETE: CPT

## 2022-07-19 PROCEDURE — 81001 URINALYSIS AUTO W/SCOPE: CPT

## 2022-07-19 PROCEDURE — 84156 ASSAY OF PROTEIN URINE: CPT

## 2022-07-19 PROCEDURE — C9606 PERC D-E COR REVASC W AMI S: HCPCS

## 2022-07-19 PROCEDURE — 93005 ELECTROCARDIOGRAM TRACING: CPT | Performed by: INTERNAL MEDICINE

## 2022-07-19 PROCEDURE — 76770 US EXAM ABDO BACK WALL COMP: CPT

## 2022-07-19 PROCEDURE — B2151ZZ FLUOROSCOPY OF LEFT HEART USING LOW OSMOLAR CONTRAST: ICD-10-PCS | Performed by: INTERNAL MEDICINE

## 2022-07-19 PROCEDURE — 4A023N7 MEASUREMENT OF CARDIAC SAMPLING AND PRESSURE, LEFT HEART, PERCUTANEOUS APPROACH: ICD-10-PCS | Performed by: INTERNAL MEDICINE

## 2022-07-19 PROCEDURE — 93458 L HRT ARTERY/VENTRICLE ANGIO: CPT | Performed by: INTERNAL MEDICINE

## 2022-07-19 RX ORDER — ACETAMINOPHEN 650 MG/1
650 SUPPOSITORY RECTAL EVERY 6 HOURS PRN
Status: DISCONTINUED | OUTPATIENT
Start: 2022-07-19 | End: 2022-07-20 | Stop reason: HOSPADM

## 2022-07-19 RX ORDER — ATORVASTATIN CALCIUM 40 MG/1
80 TABLET, FILM COATED ORAL NIGHTLY
Status: DISCONTINUED | OUTPATIENT
Start: 2022-07-19 | End: 2022-07-20 | Stop reason: HOSPADM

## 2022-07-19 RX ORDER — SODIUM CHLORIDE 9 MG/ML
INJECTION, SOLUTION INTRAVENOUS CONTINUOUS
Status: DISCONTINUED | OUTPATIENT
Start: 2022-07-19 | End: 2022-07-19

## 2022-07-19 RX ORDER — FINASTERIDE 5 MG/1
5 TABLET, FILM COATED ORAL DAILY
Status: DISCONTINUED | OUTPATIENT
Start: 2022-07-19 | End: 2022-07-20 | Stop reason: HOSPADM

## 2022-07-19 RX ORDER — ACETAMINOPHEN 325 MG/1
650 TABLET ORAL EVERY 4 HOURS PRN
Status: DISCONTINUED | OUTPATIENT
Start: 2022-07-19 | End: 2022-07-19

## 2022-07-19 RX ORDER — NITROGLYCERIN 20 MG/100ML
5-200 INJECTION INTRAVENOUS CONTINUOUS
Status: DISCONTINUED | OUTPATIENT
Start: 2022-07-19 | End: 2022-07-20

## 2022-07-19 RX ORDER — SODIUM CHLORIDE 0.9 % (FLUSH) 0.9 %
5-40 SYRINGE (ML) INJECTION EVERY 12 HOURS SCHEDULED
Status: DISCONTINUED | OUTPATIENT
Start: 2022-07-19 | End: 2022-07-19

## 2022-07-19 RX ORDER — ONDANSETRON 2 MG/ML
4 INJECTION INTRAMUSCULAR; INTRAVENOUS EVERY 6 HOURS PRN
Status: DISCONTINUED | OUTPATIENT
Start: 2022-07-19 | End: 2022-07-20 | Stop reason: HOSPADM

## 2022-07-19 RX ORDER — NITROGLYCERIN 20 MG/100ML
INJECTION INTRAVENOUS
Status: COMPLETED
Start: 2022-07-19 | End: 2022-07-19

## 2022-07-19 RX ORDER — MORPHINE SULFATE 2 MG/ML
2 INJECTION, SOLUTION INTRAMUSCULAR; INTRAVENOUS EVERY 4 HOURS PRN
Status: DISCONTINUED | OUTPATIENT
Start: 2022-07-19 | End: 2022-07-20 | Stop reason: HOSPADM

## 2022-07-19 RX ORDER — ACETAMINOPHEN 325 MG/1
650 TABLET ORAL EVERY 6 HOURS PRN
Status: DISCONTINUED | OUTPATIENT
Start: 2022-07-19 | End: 2022-07-20 | Stop reason: HOSPADM

## 2022-07-19 RX ORDER — ONDANSETRON 4 MG/1
4 TABLET, ORALLY DISINTEGRATING ORAL EVERY 8 HOURS PRN
Status: DISCONTINUED | OUTPATIENT
Start: 2022-07-19 | End: 2022-07-20 | Stop reason: HOSPADM

## 2022-07-19 RX ORDER — ONDANSETRON 2 MG/ML
4 INJECTION INTRAMUSCULAR; INTRAVENOUS EVERY 6 HOURS PRN
Status: DISCONTINUED | OUTPATIENT
Start: 2022-07-19 | End: 2022-07-19

## 2022-07-19 RX ORDER — SODIUM CHLORIDE 0.9 % (FLUSH) 0.9 %
5-40 SYRINGE (ML) INJECTION PRN
Status: DISCONTINUED | OUTPATIENT
Start: 2022-07-19 | End: 2022-07-20 | Stop reason: HOSPADM

## 2022-07-19 RX ORDER — SODIUM CHLORIDE 0.9 % (FLUSH) 0.9 %
5-40 SYRINGE (ML) INJECTION PRN
Status: DISCONTINUED | OUTPATIENT
Start: 2022-07-19 | End: 2022-07-19

## 2022-07-19 RX ORDER — POLYETHYLENE GLYCOL 3350 17 G/17G
17 POWDER, FOR SOLUTION ORAL DAILY PRN
Status: DISCONTINUED | OUTPATIENT
Start: 2022-07-19 | End: 2022-07-20 | Stop reason: HOSPADM

## 2022-07-19 RX ORDER — ASPIRIN 81 MG/1
81 TABLET ORAL DAILY
Status: DISCONTINUED | OUTPATIENT
Start: 2022-07-20 | End: 2022-07-19

## 2022-07-19 RX ORDER — SODIUM CHLORIDE 0.9 % (FLUSH) 0.9 %
5-40 SYRINGE (ML) INJECTION EVERY 12 HOURS SCHEDULED
Status: DISCONTINUED | OUTPATIENT
Start: 2022-07-19 | End: 2022-07-20 | Stop reason: HOSPADM

## 2022-07-19 RX ORDER — ROSUVASTATIN CALCIUM 40 MG/1
40 TABLET, COATED ORAL NIGHTLY
Status: DISCONTINUED | OUTPATIENT
Start: 2022-07-19 | End: 2022-07-19

## 2022-07-19 RX ORDER — AMLODIPINE BESYLATE 10 MG/1
10 TABLET ORAL DAILY
Status: DISCONTINUED | OUTPATIENT
Start: 2022-07-19 | End: 2022-07-20 | Stop reason: HOSPADM

## 2022-07-19 RX ORDER — SODIUM CHLORIDE 9 MG/ML
INJECTION, SOLUTION INTRAVENOUS PRN
Status: DISCONTINUED | OUTPATIENT
Start: 2022-07-19 | End: 2022-07-20 | Stop reason: HOSPADM

## 2022-07-19 RX ORDER — ENOXAPARIN SODIUM 100 MG/ML
30 INJECTION SUBCUTANEOUS 2 TIMES DAILY
Status: DISCONTINUED | OUTPATIENT
Start: 2022-07-19 | End: 2022-07-20 | Stop reason: HOSPADM

## 2022-07-19 RX ORDER — ASPIRIN 81 MG/1
81 TABLET, CHEWABLE ORAL DAILY
Status: DISCONTINUED | OUTPATIENT
Start: 2022-07-19 | End: 2022-07-20 | Stop reason: HOSPADM

## 2022-07-19 RX ORDER — SODIUM CHLORIDE 9 MG/ML
INJECTION, SOLUTION INTRAVENOUS PRN
Status: DISCONTINUED | OUTPATIENT
Start: 2022-07-19 | End: 2022-07-19

## 2022-07-19 RX ORDER — LISINOPRIL 5 MG/1
5 TABLET ORAL DAILY
Status: DISCONTINUED | OUTPATIENT
Start: 2022-07-19 | End: 2022-07-20

## 2022-07-19 RX ORDER — NITROGLYCERIN 0.4 MG/1
0.4 TABLET SUBLINGUAL EVERY 5 MIN PRN
Status: DISCONTINUED | OUTPATIENT
Start: 2022-07-19 | End: 2022-07-20 | Stop reason: HOSPADM

## 2022-07-19 RX ADMIN — NITROGLYCERIN 5 MCG/MIN: 20 INJECTION INTRAVENOUS at 08:48

## 2022-07-19 RX ADMIN — SODIUM CHLORIDE: 9 INJECTION, SOLUTION INTRAVENOUS at 01:48

## 2022-07-19 RX ADMIN — TICAGRELOR 90 MG: 90 TABLET ORAL at 21:44

## 2022-07-19 RX ADMIN — FINASTERIDE 5 MG: 5 TABLET, FILM COATED ORAL at 08:38

## 2022-07-19 RX ADMIN — SERTRALINE HYDROCHLORIDE 25 MG: 50 TABLET ORAL at 08:38

## 2022-07-19 RX ADMIN — ENOXAPARIN SODIUM 30 MG: 100 INJECTION SUBCUTANEOUS at 08:39

## 2022-07-19 RX ADMIN — ATORVASTATIN CALCIUM 80 MG: 40 TABLET, FILM COATED ORAL at 01:53

## 2022-07-19 RX ADMIN — AMLODIPINE BESYLATE 10 MG: 10 TABLET ORAL at 08:38

## 2022-07-19 RX ADMIN — ATORVASTATIN CALCIUM 80 MG: 40 TABLET, FILM COATED ORAL at 21:45

## 2022-07-19 RX ADMIN — PERFLUTREN 2.2 MG: 6.52 INJECTION, SUSPENSION INTRAVENOUS at 09:02

## 2022-07-19 RX ADMIN — ASPIRIN 81 MG CHEWABLE TABLET 81 MG: 81 TABLET CHEWABLE at 08:38

## 2022-07-19 RX ADMIN — METOPROLOL TARTRATE 25 MG: 25 TABLET, FILM COATED ORAL at 21:44

## 2022-07-19 RX ADMIN — TICAGRELOR 90 MG: 90 TABLET ORAL at 08:38

## 2022-07-19 RX ADMIN — METOPROLOL TARTRATE 25 MG: 25 TABLET, FILM COATED ORAL at 08:38

## 2022-07-19 RX ADMIN — SODIUM CHLORIDE: 9 INJECTION, SOLUTION INTRAVENOUS at 13:58

## 2022-07-19 RX ADMIN — LISINOPRIL 5 MG: 5 TABLET ORAL at 08:38

## 2022-07-19 ASSESSMENT — PAIN DESCRIPTION - FREQUENCY
FREQUENCY: CONTINUOUS

## 2022-07-19 ASSESSMENT — ENCOUNTER SYMPTOMS
DIARRHEA: 0
VOMITING: 0
ABDOMINAL PAIN: 0
COLOR CHANGE: 0
RHINORRHEA: 0
ANAL BLEEDING: 0
BLOOD IN STOOL: 0
SHORTNESS OF BREATH: 1
COUGH: 0
WHEEZING: 0
SORE THROAT: 0
NAUSEA: 1
CHEST TIGHTNESS: 1

## 2022-07-19 ASSESSMENT — PAIN DESCRIPTION - LOCATION
LOCATION: CHEST

## 2022-07-19 ASSESSMENT — PAIN DESCRIPTION - ORIENTATION
ORIENTATION: LEFT

## 2022-07-19 ASSESSMENT — PAIN DESCRIPTION - PAIN TYPE
TYPE: ACUTE PAIN

## 2022-07-19 ASSESSMENT — PAIN DESCRIPTION - ONSET
ONSET: ON-GOING

## 2022-07-19 ASSESSMENT — PAIN DESCRIPTION - DESCRIPTORS
DESCRIPTORS: DISCOMFORT

## 2022-07-19 ASSESSMENT — PAIN SCALES - GENERAL
PAINLEVEL_OUTOF10: 3
PAINLEVEL_OUTOF10: 2
PAINLEVEL_OUTOF10: 3
PAINLEVEL_OUTOF10: 3

## 2022-07-19 ASSESSMENT — PAIN - FUNCTIONAL ASSESSMENT
PAIN_FUNCTIONAL_ASSESSMENT: ACTIVITIES ARE NOT PREVENTED

## 2022-07-19 NOTE — PROGRESS NOTES
Dr. Cesar Riddle returns call with order to start Nitro and if no echo has been completed order echo

## 2022-07-19 NOTE — H&P
History and Physical      Name:  Catalina Alfredo /Age/Sex: 1960  (57 y.o. male)   MRN & CSN:  [id number] & [id number] Encounter Date/Time: 2022 2:19 AM EDT   Location:  -A PCP: Rivas Mello MD       Hospital Day: 2    Assessment and Plan:   Catalina Alfredo is a 57 y.o. male with a pmh of CAD, HTN, HLD, tobacco abuse, cannabis use, CKD stage IIIa, BPH, depression, and obesity, who presents with Unstable angina [facility]    Hospital Problems             Last Modified POA    * (Principal) Unstable angina ([facility] [address] 2022 Yes       Unstable angina with DB risk score of 6  Admit to ICU with telemetry  History of PCI to RCA with repeat PCI for in-stent restenosis and PCI to LAD  Given symptoms and EKG finding patient was brought in as a STEMI alert. Cardiology reviewed echocardiogram and deemed patient not to have a STEMI. Patient did proceed directly to Cath Lab for intervention for unstable angina with PCI to RCA. Patient was found to have  of LAD. Cardiac enzymes in ED 0.013  Strict bedrest  Continue ASA. Adjust metoprolol to metoprolol tartrate. Change clopidogrel to Brilinta. Increase atorvastatin from 40 to 80 mg. Continue amlodipine and lisinopril. Sublingual nitro as needed  IV Morphine for further pain  Check lipid panel and hemoglobin A1c  2D echo in a.m. Tobacco abuse  Cannabis use  Recommend immediate cessation for risk factor reduction    Stage 3a chronic kidney disease (baseline sCr ~1.8)   Baseline GFR ~55; GFR 44 in ED; follow on labs  Follow BUN/sCr, electrolytes, intake/output  Received IV contrast in Cath Lab, anticipate slight worsening of renal function. Consult nephrology, appreciate following patient    Other chronic medical conditions:   HTN  HLD  BPH  Depression  Obesity    Diet ADULT DIET; Regular; 3 carb choices (45 gm/meal);  Low Fat/Low Chol/High Fiber/DANNI; Low Sodium (2 gm)   DVT Prophylaxis [x] Lovenox, []  Heparin, [] SCDs, [] Ambulation,  [] Eliquis, [] Xarelto   Code Status Full Code     History from:     patient    History of Present Illness:     Chief Complaint: Unstable angina ([facility] [address]  Leidy Funk is a 57 y.o. male with pmh of CAD, HTN, HLD, tobacco abuse, cannabis use, CKD stage IIIa, BPH, depression, and obesity, who presents with complains of chest pain. Onset was 1 week ago, with worsening course since that time. Patient endorsed severe worsening tonight. Patient states he had just finished eating dinner and was smoking marijuana while sitting down with sudden onset of patient's pain. The patient describes the pain as intermittent over the last week but constant tonight, precordial in nature, radiates to the left neck/jaw. Patient rates pain as a 9/10 in intensity. Associated symptoms are dyspnea and nausea. Aggravating factors are walking. Alleviating factors are: 2 nitroglycerin tablets and 4 baby aspirin. Patient's cardiac risk factors are advanced age (older than 54 for men, 72 for women), dyslipidemia, hypertension, obesity (BMI >= 30 kg/m2), sedentary lifestyle, and smoking/ tobacco exposure. Patient does endorse mild headache after nitroglycerin tablets. Otherwise patient denies fever, chills, abdominal pain, emesis, diarrhea, melena, hematochezia, dysuria, frequency, or urgency. Discussed case with ED provider and interventional cardiologist.     Review of Systems:   Review of Systems   Constitutional:  Negative for appetite change, diaphoresis, fatigue and fever. HENT:  Negative for congestion, rhinorrhea and sore throat. Eyes:  Negative for visual disturbance. Respiratory:  Positive for chest tightness and shortness of breath. Negative for cough and wheezing. Cardiovascular:  Positive for chest pain. Negative for palpitations and leg swelling. Gastrointestinal:  Positive for nausea. Negative for abdominal pain, anal bleeding, blood in stool, diarrhea and vomiting.    Genitourinary:  Negative for frequency, hematuria and urgency. Musculoskeletal:  Negative for arthralgias. Skin:  Negative for color change and rash. Neurological:  Positive for headaches. Negative for dizziness, seizures, weakness and numbness. Psychiatric/Behavioral:  Negative for confusion. Objective:   No intake or output data in the 24 hours ending 07/19/22 0219   Vitals:   Vitals:    07/18/22 2345 07/19/22 0114 07/19/22 0130 07/19/22 0149   BP: (!) 161/119 (!) 156/94     Pulse: 75 (!) 49  (!) 46   Resp:  12     Temp:  97.7 °F (36.5 °C)     TempSrc:  Oral     SpO2: 93% 96%     Weight:   255 lb 4.7 oz (115.8 kg)    Height:   6' (1.829 m)        Medications Prior to Admission     Prior to Admission medications    Medication Sig Start Date End Date Taking? Authorizing Provider   finasteride (PROSCAR) 5 MG tablet Take 1 tablet by mouth daily 5/6/22   Jenifer Blackman MD   sertraline (ZOLOFT) 25 MG tablet Take 1 tablet by mouth daily 1/11/22   Jenifer Blackman MD   clopidogrel (PLAVIX) 75 MG tablet Take 1 tablet by mouth daily  Patient not taking: Reported on 7/19/2022 10/7/21   Jenifer Blackman MD   amLODIPine (NORVASC) 10 MG tablet Take 1 tablet by mouth daily Hold if Systolic BP <002 32/0/93   Jenifer Blackman MD   metoprolol tartrate (LOPRESSOR) 50 MG tablet Take 1 tablet by mouth 2 times daily Hold if SBP < 110 and HR <65 call cardiology 10/7/21   Jenifer Blackman MD   atorvastatin (LIPITOR) 40 MG tablet Take 1 tablet by mouth daily 10/7/21   Jenifer Blackman MD   lisinopril (PRINIVIL;ZESTRIL) 5 MG tablet Take 1 tablet by mouth daily 9/27/21   Jenifer Blackman MD   cloNIDine (CATAPRES) 0.1 MG tablet Take 1 tablet by mouth 2 times daily  Patient not taking: Reported on 1/11/2022 8/26/21   JASPAL Simmons   aspirin 81 MG tablet Take 1 tablet by mouth daily 8/26/21   JASPAL Simmons   nitroGLYCERIN (NITROSTAT) 0.4 MG SL tablet up to max of 3 total doses. If no relief after 1 dose, call 911.   Patient not taking: Reported on 1/11/2022 8/26/21   JASPAL Simmons   Multiple Vitamins-Minerals (MENS MULTIPLE VITAMIN/LYCOPENE) TABS Take  by mouth. Historical Provider, MD       Physical Exam:    Physical Exam  Vitals and nursing note reviewed. Constitutional:       General: He is awake. He is in acute distress. Appearance: Normal appearance. He is obese. He is ill-appearing and diaphoretic. HENT:      Head: Atraumatic. Right Ear: External ear normal.      Left Ear: External ear normal.      Nose: Nose normal. No rhinorrhea. Mouth/Throat:      Mouth: Mucous membranes are dry. Eyes:      Conjunctiva/sclera: Conjunctivae normal.   Cardiovascular:      Rate and Rhythm: Normal rate and regular rhythm. Pulses: Normal pulses. Pulmonary:      Effort: Pulmonary effort is normal. No tachypnea, prolonged expiration or respiratory distress. Breath sounds: No decreased breath sounds, wheezing, rhonchi or rales. Abdominal:      General: Abdomen is protuberant. Bowel sounds are normal.      Palpations: Abdomen is soft. Tenderness: There is no abdominal tenderness. There is no guarding or rebound. Musculoskeletal:         General: Normal range of motion. Cervical back: Neck supple. Right lower leg: No edema. Left lower leg: No edema. Skin:     General: Skin is warm and moist.      Capillary Refill: Capillary refill takes less than 2 seconds. Neurological:      Mental Status: He is alert and oriented to person, place, and time. Mental status is at baseline. Cranial Nerves: No dysarthria or facial asymmetry. Motor: No tremor or seizure activity. Psychiatric:         Attention and Perception: He is attentive. Speech: He is communicative. Speech is not slurred. Behavior: Behavior is cooperative.        Past Medical History:   PMHx   Past Medical History:   Diagnosis Date    Blood transfusion 2010    CAD (coronary artery disease)     Chest pain     GERD (gastroesophageal reflux disease)     H/O Doppler lower arterial ultrasound 02/13/2020     No evidence of significant occlusive arterial disease. H/O Doppler lower venous ultrasound 08/20/2019     No evidence of significant venous insufficiency noted in the bilateral lower, no DVT,SVT. H/O exercise stress test 05/31/2018    treadmill    H/O exercise stress test 07/23/2018 7/2018: treadmill, No Ischemia noted pt can start rehab. History of exercise stress test 06/19/2017    treadmill    History of exercise stress test 06/27/2017    treadmill    History of GI bleed 2010    History of nuclear stress test 08/09/2017    EF42%,mild to moderate inferior ischemia    History of nuclear stress test 10/11/2018    Normal EF 48%, ABN Medium size mild inferior ischemia, no change from 8/2017    Hx of echocardiogram 08/09/2017    normal,EF55-60%    HX OTHER MEDICAL     Foster care until age 14    Hyperlipidemia     Hypertension 2008    Osteoarthritis     Primarily affecting the low back    Prostatic hypertrophy, benign     S/P PTCA (percutaneous transluminal coronary angioplasty) 06/19/2018 6/19/18: Stent to LAD.  5/22/18: Stent to OM     Unspecified cerebral artery occlusion with cerebral infarction 1997    pt states he had a \"mini stroke\"\"I did not know it was  a stroke, but after I had a concussion they told me the scan showed I had had a stroke\"     PSHX:  has a past surgical history that includes Shoulder arthroscopy (Right, 1992); other surgical history (11/2012); Coronary angioplasty with stent (06/01/2017); and cardiovascular stress test (05/2017).   Allergies: No Known Allergies  Fam HX: Reviewed family history includes Alcohol Abuse in his brother; Alzheimer's Disease in his mother; Breast Cancer in his mother; Cancer in his brother; Coronary Art Dis in his father; Heart Attack (age of onset: 80) in his father; Heart Disease in his brother and brother; High Blood Pressure in his father; Hypertension in his brother and father; Learning Disabilities in his brother; Stroke in his father. Soc HX:   Social History     Socioeconomic History    Marital status:       Spouse name: None    Number of children: 3    Years of education: None    Highest education level: None   Tobacco Use    Smoking status: Light Smoker     Packs/day: 0.25     Years: 30.00     Pack years: 7.50     Types: Cigars, Cigarettes     Last attempt to quit: 2017     Years since quittin.5    Smokeless tobacco: Never   Vaping Use    Vaping Use: Never used   Substance and Sexual Activity    Alcohol use: Not Currently     Comment: rarely    Drug use: Yes     Types: Marijuana Apryl Darryl)    Sexual activity: Not Currently       Medications:   Medications:    amLODIPine  10 mg Oral Daily    finasteride  5 mg Oral Daily    lisinopril  5 mg Oral Daily    sertraline  25 mg Oral Daily    enoxaparin  30 mg SubCUTAneous BID    sodium chloride flush  5-40 mL IntraVENous 2 times per day    aspirin  81 mg Oral Daily    metoprolol tartrate  25 mg Oral BID    atorvastatin  80 mg Oral Nightly    ticagrelor  90 mg Oral BID      Infusions:    sodium chloride 75 mL/hr at 22 0148    sodium chloride       PRN Meds: nitroGLYCERIN, 0.4 mg, Q5 Min PRN  ondansetron, 4 mg, Q8H PRN   Or  ondansetron, 4 mg, Q6H PRN  acetaminophen, 650 mg, Q6H PRN   Or  acetaminophen, 650 mg, Q6H PRN  polyethylene glycol, 17 g, Daily PRN  sodium chloride flush, 5-40 mL, PRN  sodium chloride, , PRN        Labs      CBC:   Recent Labs     22  2330   WBC 9.0   HGB 17.1        BMP:    Recent Labs     22  2330      K 4.2      CO2 25   BUN 18   CREATININE 1.9*   GLUCOSE 107*     Hepatic:   Recent Labs     22  2330   AST 37   ALT 44*   BILITOT 0.3   ALKPHOS 105     Lipids:   Lab Results   Component Value Date/Time    CHOL 206 2021 12:00 AM    CHOL 157 2019 09:32 AM    HDL 33 2021 12:00 AM    TRIG 118 2021 12:00 AM     Hemoglobin A1C:   Lab Results   Component created with voice recognition software. While attempts have been made to review the dictation as it is transcribed, on occasion the spoken word can be misinterpreted by the technology leading to omissions or inappropriate words, phrases or sentences.      Electronically signed by Franny Hale DO on 7/19/2022 at 2:19 AM

## 2022-07-19 NOTE — PROGRESS NOTES
The bottom of pts bed will not lower. Notified [facility] and they stated they will bring new bed and to tag the other for repair. [employer] RN notified of bed exchange.

## 2022-07-19 NOTE — CONSULTS
Patient:   Colby Iqbal    Date:  22  :  1960, 57 y.o. Nephrologist: Lauren Garibay MD  Provider: Edda Velásquez MD    Reason for Consult: CKD stage G3a    Consult requested by : Dr Kymberly Dai    Chief Complaint:   Chest pain    HISTORY OF PRESENT ILLNESS:     Mr. Suleman Petersen is an unfortunate 62-year-old male, who presented to the emergency room (he drove himself ) with worsening of chest pain, which was ongoing for a week and a half. He also had associated shortness of breath. In the emergency department he was afebrile but the blood pressure was rather high, it is recorded 222/130, his oxygen saturation was 98% while breathing ambient air. He underwent several diagnostic tests which include imaging and biochemical.  Imaging study mainly chest x-ray was unrevealing. Biochemical testing showed creatinine of 1.9, which is close to his recent baseline. He was subsequently admitted for mainly chest pain and cardiac work-up. Normal saline 75 mL were also started    When I saw him this morning, he was alert awake and oriented. I did review his prior creatinine. His creatinine was 1.2 mg/dL back in , it slowly increased to 1.5 to 1.9 mg/dL range. Previous work-up showed no proteinuria. Renal ultrasound back in 2018 was unremarkable. PMH :   Progressive CKD stage III  Coronary artery disease he had stent placement before  Longstanding hypertension  BPH  Dyslipidemia  Mood disorder        PSH :  Cardiac intervention  2. Right shoulder surgery    Habits :   He smokes pack per day, smoking since age 21. No history of alcohol or illicit drug abuse    Soc Hx:  Patient was born in [place], but mainly lived and grew up in [place]. He moved in [facility] for the last 20 years. 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 He has 3 children. He is  and lives alone.     [facility]   Hypertension runs in the family, mom  of breast cancer at age 52, dad  from complication of CHF at age 80, nobody has kidney disease that he is aware of, and his family        REVIEW OF SYSTEMS:     All pertinent ROS neg except   Chest pain which is subsided    Current Facility-Administered Medications   Medication Dose Route Frequency Provider Last Rate Last Admin    amLODIPine (NORVASC) tablet 10 mg  10 mg Oral Daily [facility], DO   10 mg at 07/19/22 0838    finasteride (PROSCAR) tablet 5 mg  5 mg Oral Daily [facility], DO   5 mg at 07/19/22 0838    lisinopril (PRINIVIL;ZESTRIL) tablet 5 mg  5 mg Oral Daily [facility], DO   5 mg at 07/19/22 6831    sertraline (ZOLOFT) tablet 25 mg  25 mg Oral Daily [facility], DO   25 mg at 07/19/22 5042    nitroGLYCERIN (NITROSTAT) SL tablet 0.4 mg  0.4 mg SubLINGual Q5 Min PRN Bassam Yoon, DO        ondansetron (ZOFRAN-ODT) disintegrating tablet 4 mg  4 mg Oral Q8H PRN [facility], DO        Or    ondansetron (ZOFRAN) injection 4 mg  4 mg IntraVENous Q6H PRN [facility], DO        acetaminophen (TYLENOL) tablet 650 mg  650 mg Oral Q6H PRN [facility], DO        Or    acetaminophen (TYLENOL) suppository 650 mg  650 mg Rectal Q6H PRN Bassam Karrie, DO        polyethylene glycol (GLYCOLAX) packet 17 g  17 g Oral Daily PRN Bassam Yoon, DO        enoxaparin Sodium (LOVENOX) injection 30 mg  30 mg SubCUTAneous BID [facility], DO   30 mg at 07/19/22 0839    0.9 % sodium chloride infusion   IntraVENous Continuous Agnieszka Gómez MD 75 mL/hr at 07/19/22 1358 New Bag at 07/19/22 1358    sodium chloride flush 0.9 % injection 5-40 mL  5-40 mL IntraVENous 2 times per day Agnieszka Gómez MD        sodium chloride flush 0.9 % injection 5-40 mL  5-40 mL IntraVENous PRN Agnieszka Gómez MD        0.9 % sodium chloride infusion   IntraVENous PRN Agnieszka Gómez MD        aspirin chewable tablet 81 mg  81 mg Oral Daily Agnieszka Gómez MD   81 mg at 07/19/22 0838    metoprolol tartrate (LOPRESSOR) tablet 25 mg  25 mg Oral BID Janna Morel Cammie Davenport MD   25 mg at 07/19/22 0838    atorvastatin (LIPITOR) tablet 80 mg  80 mg Oral Nightly Laura Phelps MD   80 mg at 07/19/22 0153    ticagrelor (BRILINTA) tablet 90 mg  90 mg Oral BID Laura Phelps MD   90 mg at 07/19/22 3241    morphine (PF) injection 2 mg  2 mg IntraVENous Q4H PRN Heena Hutchison DO        nitroGLYCERIN 50 mg in dextrose 5% 250 mL infusion  5-200 mcg/min IntraVENous Continuous Anamika Macias MD 4.5 mL/hr at 07/19/22 1136 15 mcg/min at 07/19/22 1136       BP (!) 161/95   Pulse 56   Temp 97.7 °F (36.5 °C) (Oral)   Resp 16   Ht 6' (1.829 m)   Wt 255 lb 4.7 oz (115.8 kg)   SpO2 99%   BMI 34.62 kg/m²     PHYSICAL EXAM:  General appearance: Alert, awake and oriented  HEENT: No gross conjunctival pallor  Neck: Supple  Heart: Regular rate and rhythm  LUNGS: No crackles  Abdomen: Soft, nontender  Extremities: No edema    LABS:  CBC:   Lab Results   Component Value Date/Time    WBC 7.3 07/19/2022 05:43 AM    WBC 9.0 07/18/2022 11:30 PM    WBC 7.3 08/26/2021 07:57 AM    HGB 14.5 07/19/2022 05:43 AM    HGB 17.1 07/18/2022 11:30 PM    HGB 17.2 08/26/2021 07:57 AM     07/19/2022 05:43 AM     07/18/2022 11:30 PM     08/26/2021 07:57 AM     Renal Panel:   Lab Results   Component Value Date/Time     07/19/2022 05:43 AM     07/18/2022 11:30 PM     08/26/2021 07:57 AM    K 3.9 07/19/2022 05:43 AM    K 4.2 07/18/2022 11:30 PM    K 4.1 08/26/2021 07:57 AM     07/19/2022 05:43 AM     07/18/2022 11:30 PM     08/26/2021 07:57 AM    CO2 24 07/19/2022 05:43 AM    CO2 25 07/18/2022 11:30 PM    CO2 24 08/26/2021 07:57 AM    BUN 18 07/19/2022 05:43 AM    BUN 18 07/18/2022 11:30 PM    BUN 14 08/26/2021 07:57 AM    CREATININE 1.7 07/19/2022 05:43 AM    CREATININE 1.9 07/18/2022 11:30 PM    CREATININE 1.7 08/26/2021 07:57 AM    GFRAA 50 07/19/2022 05:43 AM    GFRAA 44 07/18/2022 11:30 PM    GFRAA 50 08/26/2021 07:57 AM    LABGLOM 41 07/19/2022 05:43 AM    LABGLOM 36 07/18/2022 11:30 PM    LABGLOM 41 08/26/2021 07:57 AM    LABALBU 3.7 07/19/2022 05:43 AM    LABALBU 4.5 07/18/2022 11:30 PM    LABALBU 4.6 08/25/2021 12:00 AM         Calcium:    Lab Results   Component Value Date/Time    CALCIUM 8.6 07/19/2022 05:43 AM     Phosphorus:    Lab Results   Component Value Date/Time    PHOS 3.3 05/23/2018 04:21 AM       U/A:    Lab Results   Component Value Date/Time    PROTEINU NEGATIVE 07/19/2022 08:02 AM    NITRU POSITIVE 07/19/2022 08:02 AM    NITRU Negative 09/22/2017 11:33 AM    COLORU YELLOW 07/19/2022 08:02 AM    PHUR 6.5 08/01/2019 09:23 AM    PHUR 6.0 09/22/2017 11:33 AM    WBCUA 11 07/19/2022 08:02 AM    RBCUA 7 07/19/2022 08:02 AM    MUCUS RARE 07/19/2022 08:02 AM    TRICHOMONAS NONE SEEN 07/19/2022 08:02 AM    BACTERIA MODERATE 07/19/2022 08:02 AM    CLARITYU CLEAR 07/19/2022 08:02 AM    SPECGRAV 1.015 07/19/2022 08:02 AM    UROBILINOGEN 0.2 07/19/2022 08:02 AM    BILIRUBINUR NEGATIVE 07/19/2022 08:02 AM    BILIRUBINUR negative 08/01/2019 09:23 AM    BLOODU SMALL 07/19/2022 08:02 AM    GLUCOSEU negative 08/01/2019 09:23 AM    GLUCOSEU Negative 09/22/2017 11:33 AM    KETUA NEGATIVE 07/19/2022 08:02 AM           IMPRESSION:  Progressive CKD-likely from underlying hypertension, and atherosclerotic disease-we will get a baseline ultrasound as it was done 4 years ago, as well as urinary indicis.   But with history of BPH we will rule out acute bladder obstruction  Underlying hypertension coronary artery disease chest pain  Dyslipidemia    PLAN:    Start with UA and urinary indicis  Bladder scan and renal ultrasound  Good blood pressure, and lipid control  Is getting cardiac work-up  Follow clinically and biochemically  She needs good renin-angiotensin aldosterone blocker  Follow clinically

## 2022-07-19 NOTE — PROGRESS NOTES
Hospitalist Progress Note      Name:  Tory Rose /Age/Sex: 1960  (57 y.o. male)   MRN & CSN:  [id number] & [id number] Admission Date/Time: 2022 11:22 PM   Location:   PCP: Shital Karimi MD         Hospital Day: 2    Assessment and Plan:       Chest pain secondary to possible STEMI status post left heart cath with stent placement  Patient has history significant for coronary artery disease status post PCI to LAD and RCA  Continue on DAPT  Continue on beta-blocker  Monitor hemoglobin and serum creatinine  Continue on telemetry  Management per cardiology    2-acute on chronic kidney disease: Serum creatinine down to 1.7  Was 1.9 yesterday  Continue IV fluid to prevent KIRK  Avoid nephrotoxic monitor renal function and serum electrolytes  Nephrologist on board    Tobacco abuse and cannabis use: Counseled, recommend immediate cessation for risk factor reduction    Diet ADULT DIET; Regular; 3 carb choices (45 gm/meal); Low Fat/Low Chol/High Fiber/DANNI; Low Sodium (2 gm)   DVT Prophylaxis [x] Lovenox, []  Heparin, [] SCDs, [] Ambulation   GI Prophylaxis [] PPI,  [] H2 Blocker,  [] Carafate,  [] Diet/Tube Feeds   Code Status Full Code   Disposition Patient requires continued admission due to    MDM [] Low, [] Moderate,[]  High  Patient's risk as above due to      History of Present Illness: The patient was seen and examined at the bedside  Patient underwent urgent left heart cath today early morning for possible STEMI  Patient report intermittent chest pain and shortness of breath    Objective: Intake/Output Summary (Last 24 hours) at 2022 0814  Last data filed at 2022 0758  Gross per 24 hour   Intake --   Output 1000 ml   Net -1000 ml      Vitals:   Vitals:    22 0700   BP: (!) 149/99   Pulse: 51   Resp: 16   Temp:    SpO2: 99%     Physical Exam:   GEN Awake.  Alert , not in respiratory distress, not in pain  HEENT: PEERLA, , supple neck,   Chest: air entry equal bilaterally, no wheezing or crepitation  Heart: S1 and S2 heard, no murmur, no gallop or rub, regular rate  Abdomen: soft, ND , Nt, +BS  Extremities: no cyanosis, tenderness or erythema, peripheral pulses audible  Neurology: alert, oriented x3, able to move 4 limbs    Medications:   Medications:    amLODIPine  10 mg Oral Daily    finasteride  5 mg Oral Daily    lisinopril  5 mg Oral Daily    sertraline  25 mg Oral Daily    enoxaparin  30 mg SubCUTAneous BID    sodium chloride flush  5-40 mL IntraVENous 2 times per day    aspirin  81 mg Oral Daily    metoprolol tartrate  25 mg Oral BID    atorvastatin  80 mg Oral Nightly    ticagrelor  90 mg Oral BID      Infusions:    sodium chloride 75 mL/hr at 07/19/22 0148    sodium chloride       PRN Meds: nitroGLYCERIN, 0.4 mg, Q5 Min PRN  ondansetron, 4 mg, Q8H PRN   Or  ondansetron, 4 mg, Q6H PRN  acetaminophen, 650 mg, Q6H PRN   Or  acetaminophen, 650 mg, Q6H PRN  polyethylene glycol, 17 g, Daily PRN  sodium chloride flush, 5-40 mL, PRN  sodium chloride, , PRN  morphine, 2 mg, Q4H PRN          Electronically signed by Diana Gorman MD on 7/19/2022 at 8:14 AM

## 2022-07-19 NOTE — H&P
INPATIENT CARDIOLOGY CONSULT NOTE         Reason for consultation:  STEMI    Referring physician:  Marcella Austin DO     Primary care physician: Othelia Boeck, MD      Dear Marcella Austin DO Thank you for the consult    Chief Complaint   Patient presents with    Chest Pain       History of present illness:Valentino is a 57 y. o.year old who  presents with   Chief Complaint   Patient presents with    Chest Pain       Patient is a 18-year-old -American male with prior medical history significant for coronary artery disease s/p PCI to LAD and RCA presented to the hospital with chief complaint of chest pain. He mentioned that he was in [place] the past 1 week where he has been experiencing intermittent chest discomfort got worse today. Patient presented to the hospital.  An EKG in the ER today showed suspected ST elevation MI  Cardiac cath suite was activated. EKG reviewed: Shows subtle ST changes noted in V1 and V2 with ST depression in inferior leads/reciprocal           Past medical history:    has a past medical history of Blood transfusion, CAD (coronary artery disease), Chest pain, GERD (gastroesophageal reflux disease), H/O Doppler lower arterial ultrasound, H/O Doppler lower venous ultrasound, H/O exercise stress test, H/O exercise stress test, History of exercise stress test, History of exercise stress test, History of GI bleed, History of nuclear stress test, History of nuclear stress test, Hx of echocardiogram, HX OTHER MEDICAL, Hyperlipidemia, Hypertension, Osteoarthritis, Prostatic hypertrophy, benign, S/P PTCA (percutaneous transluminal coronary angioplasty), and Unspecified cerebral artery occlusion with cerebral infarction. Past surgical history:   has a past surgical history that includes Shoulder arthroscopy (Right, 1992); other surgical history (11/2012); Coronary angioplasty with stent (06/01/2017); and cardiovascular stress test (05/2017).   Social History:   reports that he has been smoking agreeable for procedure    Will take patient to cardiac cath suite  for emergent heart cath       Thank you for the consult    Dr. Anitha Houston  7/19/2022 12:02 AM

## 2022-07-19 NOTE — ED PROVIDER NOTES
CHIEF COMPLAINT    Chief Complaint   Patient presents with    Chest Pain     HPI  Amparo Lozano is a 57 y.o. male with history of coronary artery disease, hyperlipidemia, hypertension, CVA who presents to the ED with complaints of chest pain. Patient states he has been experiencing some mild anterior chest pain over the last few days which became acutely worse a few hours prior to arrival.  The pain is across the anterior chest and described as a throbbing and stabbing pain with pressure as well. Pain is described as 8/10 and exacerbated with exertion. Nothing makes it better. He did take 2 aspirin prior to arrival.  The pain is constant. Patient has previous history of coronary artery disease with stents in his LAD and RCA. He follows with Dr. Aleksey Sarmiento of cardiology. Denies fevers, chills, shortness of breath, nausea, vomiting, abdominal pain, back pain      REVIEW OF SYSTEMS  Constitutional: No fever, chills or recent illness. Eye: No visual changes  HENT: No earache or sore throat. Resp: No SOB or productive cough. Cardio: Complains of chest pain  GI: No abdominal pain, nausea, vomiting, constipation or diarrhea. No melena. : No dysuria, urgency or frequency. Endocrine: No heat intolerance, no cold intolerance, no polydipsia   Lymphatics: No adenopathy  Musculoskeletal: No new muscle aches or joint pain. Neuro: No headaches. Psych: No homicidal or suicidal thoughts  Skin: No rash, No itching. ?  ? PAST MEDICAL HISTORY  Past Medical History:   Diagnosis Date    Blood transfusion 2010    CAD (coronary artery disease)     Chest pain     GERD (gastroesophageal reflux disease)     H/O Doppler lower arterial ultrasound 02/13/2020     No evidence of significant occlusive arterial disease. H/O Doppler lower venous ultrasound 08/20/2019     No evidence of significant venous insufficiency noted in the bilateral lower, no DVT,SVT.     H/O exercise stress test 05/31/2018    treadmill    H/O exercise stress test 2018: treadmill, No Ischemia noted pt can start rehab. History of exercise stress test 2017    treadmill    History of exercise stress test 2017    treadmill    History of GI bleed     History of nuclear stress test 2017    EF42%,mild to moderate inferior ischemia    History of nuclear stress test 10/11/2018    Normal EF 48%, ABN Medium size mild inferior ischemia, no change from 2017    Hx of echocardiogram 2017    normal,EF55-60%    HX OTHER MEDICAL     Foster care until age 14    Hyperlipidemia     Hypertension     Osteoarthritis     Primarily affecting the low back    Prostatic hypertrophy, benign     S/P PTCA (percutaneous transluminal coronary angioplasty) 2018: Stent to LAD.  18: Stent to OM     Unspecified cerebral artery occlusion with cerebral infarction     pt states he had a \"mini stroke\"\"I did not know it was  a stroke, but after I had a concussion they told me the scan showed I had had a stroke\"     FAMILY HISTORY  Family History   Problem Relation Age of Onset    Coronary Art Dis Father     Hypertension Father     Stroke Father     High Blood Pressure Father     Heart Attack Father 80            Breast Cancer Mother     Alzheimer's Disease Mother          80    Alcohol Abuse Brother     Cancer Brother         type unknown    Hypertension Brother     Learning Disabilities Brother     Heart Disease Brother     Heart Disease Brother      SOCIAL HISTORY  Social History     Socioeconomic History    Marital status:       Spouse name: None    Number of children: 3    Years of education: None    Highest education level: None   Tobacco Use    Smoking status: Light Smoker     Packs/day: 0.25     Years: 30.00     Pack years: 7.50     Types: Cigars, Cigarettes     Last attempt to quit: 2017     Years since quittin.5    Smokeless tobacco: Never   Vaping Use    Vaping Use: Never used   Substance and Sexual Activity Alcohol use: Not Currently     Comment: rarely    Drug use: Yes     Types: Marijuana Sanjuanita Ambazam)    Sexual activity: Not Currently       SURGICAL HISTORY  Past Surgical History:   Procedure Laterality Date    CARDIOVASCULAR STRESS TEST  05/2017    inferior ischemia    CORONARY ANGIOPLASTY WITH STENT PLACEMENT  06/01/2017    60% stenosis    OTHER SURGICAL HISTORY  11/2012    thyroid biopsy--benign    SHOULDER ARTHROSCOPY Right 1992    R shoulder scope partial rot cuff repair     CURRENT MEDICATIONS  Previous Medications    AMLODIPINE (NORVASC) 10 MG TABLET    Take 1 tablet by mouth daily Hold if Systolic BP <223    ASPIRIN 81 MG TABLET    Take 1 tablet by mouth daily    ATORVASTATIN (LIPITOR) 40 MG TABLET    Take 1 tablet by mouth daily    CLONIDINE (CATAPRES) 0.1 MG TABLET    Take 1 tablet by mouth 2 times daily    CLOPIDOGREL (PLAVIX) 75 MG TABLET    Take 1 tablet by mouth daily    FINASTERIDE (PROSCAR) 5 MG TABLET    Take 1 tablet by mouth daily    LISINOPRIL (PRINIVIL;ZESTRIL) 5 MG TABLET    Take 1 tablet by mouth daily    METOPROLOL TARTRATE (LOPRESSOR) 50 MG TABLET    Take 1 tablet by mouth 2 times daily Hold if SBP < 110 and HR <65 call cardiology    MULTIPLE VITAMINS-MINERALS (MENS MULTIPLE VITAMIN/LYCOPENE) TABS    Take  by mouth. NITROGLYCERIN (NITROSTAT) 0.4 MG SL TABLET    up to max of 3 total doses. If no relief after 1 dose, call 911. SERTRALINE (ZOLOFT) 25 MG TABLET    Take 1 tablet by mouth daily     ALLERGIES  No Known Allergies    Nursing notes reviewed by myself for past medical history, family history, social history, surgical history, current medications, and allergies.     PHYSICAL EXAM  VITAL SIGNS: Triage VS:    ED Triage Vitals   Enc Vitals Group      BP 07/18/22 2329 (!) 222/130      Heart Rate 07/18/22 2318 80      Resp 07/18/22 2318 22      Temp 07/18/22 2329 97.6 °F (36.4 °C)      Temp Source 07/18/22 2329 Oral      SpO2 07/18/22 2318 100 %      Weight --       Height --       Head Circumference --       Peak Flow --       Pain Score --       Pain Loc --       Pain Edu? --       Excl. in [address]? --      Constitutional: Well developed, Well nourished, non-toxic appearing, appears uncomfortable secondary to pain  HENT: Normocephalic, Atraumatic, Bilateral external ears normal, Oropharynx moist, No oral exudates, Nose normal.   Eyes: PERRL, EOMI, Conjunctiva normal, No discharge. No scleral icterus. Neck: Normal range of motion, No tenderness, Supple. Lymphatic: No lymphadenopathy noted. Cardiovascular: Normal heart rate, Normal rhythm, No murmurs, gallops or rubs. Thorax & Lungs: Normal breath sounds, No respiratory distress, No wheezing. Abdomen: Soft, No tenderness, No masses, No pulsatile masses, No distention, Normal bowel sounds  Skin: Warm, Dry, Pink, No mottling, No erythema, No rash. Back: No tenderness, No CVA tenderness. Extremities: Symmetrical trace edema to bilateral lower extremities, no tenderness, No cyanosis, Normal perfusion, No clubbing. Musculoskeletal: Good range of motion in all major joints as observed. No major deformities noted. Neurologic: Alert & oriented x 3,  No focal deficits noted. Psychiatric: Affect normal, Judgment normal, Mood normal.   EKG  EKG per my interpretation demonstrates normal sinus rhythm at a rate of 73 bpm.  Normal axis. Normal intervals. ST segment elevation in leads V1 and V2 with flattening of T waves in leads I and aVL  RADIOLOGY  Labs Reviewed   CBC WITH AUTO DIFFERENTIAL   BASIC METABOLIC PANEL   HEPATIC FUNCTION PANEL   LIPASE   TROPONIN   PROTIME-INR   APTT     I personally reviewed the images.  The radiologist's interpretation reveals:  Last Imaging results   XR CHEST PORTABLE    (Results Pending)       MEDS GIVEN IN ED:  Medications   aspirin chewable tablet 162 mg (162 mg Oral Given 7/18/22 2332)   nitroGLYCERIN (NITROSTAT) SL tablet 0.4 mg (0.4 mg SubLINGual Given 7/18/22 2333)     COURSE & MEDICAL DECISION MAKING    26-year-old male presents emergency department complaints of chest pain that became acutely worse this evening. Has known history of coronary artery disease with stents in place. Initial vital signs demonstrate hyperglycemia. He appears uncomfortable on exam.  Lungs are clear to auscultation bilaterally. Has symmetrical trace edema to bilateral lower extremities. His initial EKG showed some ST segment elevations in leads V1 and V2 with flattening in leads I and aVL concerning for developing STEMI. We did initiate STEMI alert and I spoke with Dr. Uche Puri of cardiology who reviewed the EKGs. Patient was subsequently evaluated at bedside by Dr. Albino Curtis at this time will be taken to cardiac Cath Lab. His laboratory studies are currently pending. Nitroglycerin and aspirin were ordered for the patient as well and the nitroglycerin did improve his pain. Patient subsequently discussed with Dr. Kath Meade of hospitalist team who agreed to hospitalize patient. Critical Care Time: I have personally provided 15 minutes of critical care time (excluding separately listed billable procedures) through obtaining a history, examining the patient, reviewing relevant medical records, discussing care with family and/or other providers, performing multiple reassessments and directing care. Amount and/or Complexity of Data Reviewed  Clinical lab tests: reviewed  Decide to obtain previous medical records or to obtain history from someone other than the patient: yes       -  Patient seen and evaluated in the emergency department. -  Triage and nursing notes reviewed and incorporated. -  Old chart records reviewed and incorporated. -  Work-up included:  See above  -  Results discussed with patient. Appropriate PPE utilized as indicated for entire patient encounter? Time of Disposition: See timeline  ? New Prescriptions    No medications on file     FINAL IMPRESSION  1. Chest pain, unspecified type    2.  ST elevation myocardial infarction (STEMI), unspecified artery ([facility] [address]    3. Uncontrolled hypertension      Electronically signed by:  [address], DO, 7/18/2022         [address], DO  07/18/22 1075

## 2022-07-19 NOTE — PLAN OF CARE
Problem: Chronic Conditions and Co-morbidities  Goal: Patient's chronic conditions and co-morbidity symptoms are monitored and maintained or improved  Outcome: Not Progressing Towards Goal     Problem: Discharge Planning  Goal: Discharge to home or other facility with appropriate resources  Outcome: Not Progressing Towards Goal  Flowsheets (Taken 7/19/2022 0139 by Nori George RN)  Discharge to home or other facility with appropriate resources: Identify barriers to discharge with patient and caregiver     Problem: ABCDS Injury Assessment  Goal: Absence of physical injury  Outcome: Not Progressing Towards Goal     Problem: Pain  Goal: Verbalizes/displays adequate comfort level or baseline comfort level  Outcome: Not Progressing Towards Goal

## 2022-07-19 NOTE — ED NOTES
Second Ntg 0.4mg given to patient.   Pt reports being pain free after 2nd dose     Saritha Sullivan RN  07/18/22 7679

## 2022-07-20 VITALS
DIASTOLIC BLOOD PRESSURE: 88 MMHG | OXYGEN SATURATION: 96 % | HEART RATE: 46 BPM | WEIGHT: 255.29 LBS | RESPIRATION RATE: 15 BRPM | SYSTOLIC BLOOD PRESSURE: 114 MMHG | BODY MASS INDEX: 34.58 KG/M2 | TEMPERATURE: 97.9 F | HEIGHT: 72 IN

## 2022-07-20 LAB
EKG ATRIAL RATE: 47 BPM
EKG DIAGNOSIS: NORMAL
EKG P AXIS: 48 DEGREES
EKG P-R INTERVAL: 142 MS
EKG Q-T INTERVAL: 536 MS
EKG QRS DURATION: 100 MS
EKG QTC CALCULATION (BAZETT): 474 MS
EKG R AXIS: -9 DEGREES
EKG T AXIS: 146 DEGREES
EKG VENTRICULAR RATE: 47 BPM

## 2022-07-20 PROCEDURE — 6370000000 HC RX 637 (ALT 250 FOR IP): Performed by: INTERNAL MEDICINE

## 2022-07-20 PROCEDURE — 80053 COMPREHEN METABOLIC PANEL: CPT

## 2022-07-20 PROCEDURE — 2580000003 HC RX 258: Performed by: INTERNAL MEDICINE

## 2022-07-20 PROCEDURE — 85027 COMPLETE CBC AUTOMATED: CPT

## 2022-07-20 PROCEDURE — 99233 SBSQ HOSP IP/OBS HIGH 50: CPT | Performed by: INTERNAL MEDICINE

## 2022-07-20 PROCEDURE — 94761 N-INVAS EAR/PLS OXIMETRY MLT: CPT

## 2022-07-20 PROCEDURE — 6360000002 HC RX W HCPCS: Performed by: STUDENT IN AN ORGANIZED HEALTH CARE EDUCATION/TRAINING PROGRAM

## 2022-07-20 PROCEDURE — 6370000000 HC RX 637 (ALT 250 FOR IP): Performed by: STUDENT IN AN ORGANIZED HEALTH CARE EDUCATION/TRAINING PROGRAM

## 2022-07-20 PROCEDURE — 93005 ELECTROCARDIOGRAM TRACING: CPT | Performed by: STUDENT IN AN ORGANIZED HEALTH CARE EDUCATION/TRAINING PROGRAM

## 2022-07-20 PROCEDURE — 93010 ELECTROCARDIOGRAM REPORT: CPT | Performed by: INTERNAL MEDICINE

## 2022-07-20 RX ORDER — ATORVASTATIN CALCIUM 80 MG/1
80 TABLET, FILM COATED ORAL NIGHTLY
Qty: 30 TABLET | Refills: 3 | Status: SHIPPED | OUTPATIENT
Start: 2022-07-20

## 2022-07-20 RX ORDER — ISOSORBIDE MONONITRATE 30 MG/1
30 TABLET, EXTENDED RELEASE ORAL DAILY
Status: DISCONTINUED | OUTPATIENT
Start: 2022-07-20 | End: 2022-07-20 | Stop reason: HOSPADM

## 2022-07-20 RX ORDER — ISOSORBIDE MONONITRATE 30 MG/1
30 TABLET, EXTENDED RELEASE ORAL DAILY
Qty: 30 TABLET | Refills: 3 | Status: SHIPPED | OUTPATIENT
Start: 2022-07-21

## 2022-07-20 RX ORDER — LISINOPRIL 40 MG/1
40 TABLET ORAL DAILY
Qty: 30 TABLET | Refills: 3 | Status: SHIPPED | OUTPATIENT
Start: 2022-07-21 | End: 2022-08-01

## 2022-07-20 RX ORDER — CHLORTHALIDONE 25 MG/1
25 TABLET ORAL DAILY
Status: DISCONTINUED | OUTPATIENT
Start: 2022-07-20 | End: 2022-07-20 | Stop reason: HOSPADM

## 2022-07-20 RX ORDER — TAMSULOSIN HYDROCHLORIDE 0.4 MG/1
0.8 CAPSULE ORAL DAILY
Qty: 30 CAPSULE | Refills: 3 | Status: SHIPPED | OUTPATIENT
Start: 2022-07-21 | End: 2022-07-26

## 2022-07-20 RX ORDER — TAMSULOSIN HYDROCHLORIDE 0.4 MG/1
0.8 CAPSULE ORAL DAILY
Status: DISCONTINUED | OUTPATIENT
Start: 2022-07-20 | End: 2022-07-20 | Stop reason: HOSPADM

## 2022-07-20 RX ORDER — METOPROLOL TARTRATE 50 MG/1
50 TABLET, FILM COATED ORAL 2 TIMES DAILY
Status: DISCONTINUED | OUTPATIENT
Start: 2022-07-20 | End: 2022-07-20 | Stop reason: HOSPADM

## 2022-07-20 RX ORDER — DOXAZOSIN 2 MG/1
2 TABLET ORAL EVERY 12 HOURS SCHEDULED
Qty: 30 TABLET | Refills: 3 | Status: SHIPPED | OUTPATIENT
Start: 2022-07-20 | End: 2022-07-26

## 2022-07-20 RX ORDER — CHLORTHALIDONE 25 MG/1
25 TABLET ORAL DAILY
Qty: 30 TABLET | Refills: 3 | Status: SHIPPED | OUTPATIENT
Start: 2022-07-21 | End: 2022-07-26 | Stop reason: SINTOL

## 2022-07-20 RX ORDER — DOXAZOSIN MESYLATE 4 MG/1
2 TABLET ORAL EVERY 12 HOURS SCHEDULED
Status: DISCONTINUED | OUTPATIENT
Start: 2022-07-20 | End: 2022-07-20 | Stop reason: HOSPADM

## 2022-07-20 RX ORDER — LISINOPRIL 20 MG/1
40 TABLET ORAL DAILY
Status: DISCONTINUED | OUTPATIENT
Start: 2022-07-20 | End: 2022-07-20 | Stop reason: HOSPADM

## 2022-07-20 RX ADMIN — SERTRALINE HYDROCHLORIDE 25 MG: 50 TABLET ORAL at 08:33

## 2022-07-20 RX ADMIN — METOPROLOL TARTRATE 25 MG: 25 TABLET, FILM COATED ORAL at 08:33

## 2022-07-20 RX ADMIN — LISINOPRIL 40 MG: 20 TABLET ORAL at 08:32

## 2022-07-20 RX ADMIN — DOXAZOSIN 2 MG: 4 TABLET ORAL at 11:30

## 2022-07-20 RX ADMIN — CHLORTHALIDONE 25 MG: 25 TABLET ORAL at 08:32

## 2022-07-20 RX ADMIN — SODIUM CHLORIDE, PRESERVATIVE FREE 10 ML: 5 INJECTION INTRAVENOUS at 08:35

## 2022-07-20 RX ADMIN — FINASTERIDE 5 MG: 5 TABLET, FILM COATED ORAL at 08:35

## 2022-07-20 RX ADMIN — TAMSULOSIN HYDROCHLORIDE 0.8 MG: 0.4 CAPSULE ORAL at 08:34

## 2022-07-20 RX ADMIN — AMLODIPINE BESYLATE 10 MG: 10 TABLET ORAL at 08:32

## 2022-07-20 RX ADMIN — TICAGRELOR 90 MG: 90 TABLET ORAL at 08:33

## 2022-07-20 RX ADMIN — MORPHINE SULFATE 2 MG: 2 INJECTION, SOLUTION INTRAMUSCULAR; INTRAVENOUS at 04:06

## 2022-07-20 RX ADMIN — ASPIRIN 81 MG CHEWABLE TABLET 81 MG: 81 TABLET CHEWABLE at 08:33

## 2022-07-20 RX ADMIN — ISOSORBIDE MONONITRATE 30 MG: 30 TABLET, EXTENDED RELEASE ORAL at 14:02

## 2022-07-20 ASSESSMENT — PAIN DESCRIPTION - ORIENTATION: ORIENTATION: MID;LOWER

## 2022-07-20 ASSESSMENT — PAIN DESCRIPTION - FREQUENCY: FREQUENCY: INTERMITTENT

## 2022-07-20 ASSESSMENT — PAIN SCALES - GENERAL
PAINLEVEL_OUTOF10: 4
PAINLEVEL_OUTOF10: 1

## 2022-07-20 ASSESSMENT — PAIN - FUNCTIONAL ASSESSMENT: PAIN_FUNCTIONAL_ASSESSMENT: ACTIVITIES ARE NOT PREVENTED

## 2022-07-20 ASSESSMENT — PAIN DESCRIPTION - PAIN TYPE: TYPE: ACUTE PAIN

## 2022-07-20 ASSESSMENT — PAIN DESCRIPTION - LOCATION: LOCATION: BACK

## 2022-07-20 ASSESSMENT — PAIN DESCRIPTION - ONSET: ONSET: PROGRESSIVE

## 2022-07-20 ASSESSMENT — PAIN DESCRIPTION - DESCRIPTORS: DESCRIPTORS: BURNING;DISCOMFORT

## 2022-07-20 NOTE — DISCHARGE SUMMARY
V2.0  Discharge Summary    Name:  Tj Saunders /Age/Sex: 1960 (77 y.o. male)   Admit Date: 2022  Discharge Date: 22    MRN & CSN:  [id number] & [id number] Encounter Date and Time 22 2:46 PM EDT    Attending:  Nancy Peña MD Discharging Provider: JASPAL Solis - CNP       Hospital Course:     Brief HPI: Tj Saunders is a 57 y.o. male who presented with intermittent chest pain for 1 week. Brief Problem Based Course:   Coronary artery disease-status post STEMI  -Status post cardiac cath with mid LAD stent occlusion, and mid RCA stenosis status post PCI  -Cardiology followed  -Follow-up with cardiology outpatient for ischemia eval/viability study to determine need to open LAD  -Patient was to be on Plavix and aspirin at home; questionable compliance  -Started on Brilinta and aspirin; continue on discharge  Chest pain  -Cardiology followed  -Started on Imdur; continue discharge  Hypertensive emergency-sbp in the 200s  -Nephrology followed  -Cardiology followed  -Increase metoprolol 50 mg twice daily, increase lisinopril to 40 mg daily, start chlorthalidone 25 daily, start Cardura 2 mg daily. Chronic kidney disease-Baseline sCr 1.7; GFR 50  -Nephrology followed  -Follow-up with nephrology in 1 week outpatient; obtain CMP, mag, Phos prior to appointment  -Continue Flomax and Proscar on discharge      The patient expressed appropriate understanding of, and agreement with the discharge recommendations, medications, and plan.      Consults this admission:  IP CONSULT TO CARDIAC REHAB  IP CONSULT TO CARDIAC REHAB  IP CONSULT TO NEPHROLOGY  IP CONSULT TO CASE MANAGEMENT    Discharge Diagnosis:   Unstable angina ([address]    As above    Discharge Instruction:   Follow up appointments: Dr. Lore Morley in 1 week; Dr. Agnieszka Oconnor call to make appointment  Primary care physician: Yolie Browning MD within 2 weeks  Diet: cardiac diet   Activity: activity as tolerated  Disposition: Discharged to:   [x]Home, [][facility], []SNF, []Acute Rehab, []Hospice   Condition on discharge: Stable  Labs and Tests to be Followed up as an outpatient by PCP or Specialist: Mark Gore, and [place] per Dr. Dorota Guillen    Discharge Medications:        Medication List        START taking these medications      chlorthalidone 25 MG tablet  Commonly known as: HYGROTON  Take 1 tablet by mouth in the morning. Start taking on: July 21, 2022     doxazosin 2 MG tablet  Commonly known as: CARDURA  Take 1 tablet by mouth in the morning and 1 tablet before bedtime. isosorbide mononitrate 30 MG extended release tablet  Commonly known as: IMDUR  Take 1 tablet by mouth in the morning. Start taking on: July 21, 2022     tamsulosin 0.4 MG capsule  Commonly known as: FLOMAX  Take 2 capsules by mouth in the morning. Start taking on: July 21, 2022     ticagrelor 90 MG Tabs tablet  Commonly known as: BRILINTA  Take 1 tablet by mouth in the morning and 1 tablet before bedtime. CHANGE how you take these medications      atorvastatin 80 MG tablet  Commonly known as: LIPITOR  Take 1 tablet by mouth nightly  What changed:   medication strength  how much to take  when to take this     lisinopril 40 MG tablet  Commonly known as: PRINIVIL;ZESTRIL  Take 1 tablet by mouth in the morning. Start taking on: July 21, 2022  What changed:   medication strength  how much to take            CONTINUE taking these medications      amLODIPine 10 MG tablet  Commonly known as: NORVASC  Take 1 tablet by mouth daily Hold if Systolic BP <894     aspirin 81 MG tablet  Take 1 tablet by mouth daily     finasteride 5 MG tablet  Commonly known as: Proscar  Take 1 tablet by mouth daily     Mens Multiple Vitamin/Lycopene Tabs     metoprolol tartrate 50 MG tablet  Commonly known as: LOPRESSOR  Take 1 tablet by mouth 2 times daily Hold if SBP < 110 and HR <65 call cardiology     nitroGLYCERIN 0.4 MG SL tablet  Commonly known as: NITROSTAT  up to max of 3 total doses.  If no relief after 1 dose, call 911.     sertraline 25 MG tablet  Commonly known as: ZOLOFT  Take 1 tablet by mouth daily            STOP taking these medications      cloNIDine 0.1 MG tablet  Commonly known as: CATAPRES     clopidogrel 75 MG tablet  Commonly known as: PLAVIX               Where to Get Your Medications        These medications were sent to [facility], [address] [place] [id number]      Phone: [phone]   atorvastatin 80 MG tablet  chlorthalidone 25 MG tablet  doxazosin 2 MG tablet  isosorbide mononitrate 30 MG extended release tablet  lisinopril 40 MG tablet  tamsulosin 0.4 MG capsule  ticagrelor 90 MG Tabs tablet        Objective Findings at Discharge:   BP (!) 126/91   Pulse (!) 47   Temp 97.9 °F (36.6 °C) (Oral)   Resp 14   Ht 6' (1.829 m)   Wt 255 lb 4.7 oz (115.8 kg)   SpO2 96%   BMI 34.62 kg/m²       Patient seen and examined sitting in chair. He denies any complaints. I counseled the patient on importance of medication compliance. We discussed discharge instructions. Physical Exam:   General: NAD  Eyes: EOMI  ENT: neck supple  Cardiovascular: Regular rate. Respiratory: Clear to auscultation  Gastrointestinal: Soft, non tender  Genitourinary: no suprapubic tenderness  Musculoskeletal: No edema  Skin: warm, dry  Neuro: Alert. Psych: Mood appropriate.          Labs and Imaging   Echocardiogram complete 2D with doppler with color    Result Date: 7/19/2022  Transthoracic Echocardiography Report (TTE)  Demographics   Patient Name       [facility]    Date of Study       07/19/2022   Date of Birth      1960        Gender              Male   Age                57 year(s)        Race                Black   Patient Number     [id number]        Room Number         2103   Visit Number       [id number]   Corporate ID       [id number]   Accession Number   [id number]        [id number] Pleural Effusion  No evidence of pleural effusion. Miscellaneous  IVC and abdominal aorta are not clearly visualized due to poor subcostal  window.   M-Mode/2D Measurements & Calculations   LV Diastolic Dimension:  LV Systolic Dimension:  LA Dimension: 3 cmAO Root  4.76 cm                  2.94 cm                 Dimension: 3.5 cmLA Area:  LV FS:38.2 %             LV Volume Diastolic: 96 39.8 cm2  LV PW Diastolic: 1.6 cm  ml  LV PW Systolic: 9.76 cm  LV Volume Systolic: 51  Septum Diastolic: 5.74   ml  cm                       LV EDV/LV EDV Index: 96 RV Diastolic Dimension:  Septum Systolic: 7.49 cm UG/79 [id number] ESV/LV ESV   2.54 cm  CO: 4.6 l/min            Index: 51 ml/23 m2  CI: 2.07 l/m*m2          EF Calculated (A4C):    LA/Aorta: 0.86                           46.9 %  LV Area Diastolic: 19.8  EF Calculated (2D):     LA volume/Index: 31 ml  cm2                      68.3 %                  /45T3  LV Area Systolic: 86.5  cm2                      LV Length: 8.33 cm                            LVOT: 2.2 cm  Doppler Measurements & Calculations   MV Peak E-Wave: 62.7 AV Peak Velocity: 182 cm/s    LVOT Peak Velocity: 99.3  cm/s                 AV Peak Gradient: 13.25 mmHg  cm/s  MV Peak A-Wave: 80.9 AV Mean Velocity: 142 cm/s    LVOT Mean Velocity: 82.3  cm/s                 AV Mean Gradient: 9 mmHg      cm/s  MV E/A Ratio: 0.78   AV VTI: 38 cm                 LVOT Peak Gradient: 4  MV Peak Gradient:    AV Area (Continuity):1.89 cm2 mmHgLVOT Mean Gradient: 3  1.57 mmHg                                          mmHg                       LVOT VTI: 18.9 cm             Estimated RVSP: 17 mmHg  MV P1/2t: 50 msec                                  Estimated RAP:3 mmHg  MVA by PHT:4.4 cm2   Estimated PASP: 17.44 mmHg   MV E' Septal                                       TR Velocity:190 cm/s  Velocity: 5.48 cm/s                                TR Gradient:14.44 mmHg  MV E' Lateral  Velocity: 7.9 cm/s  MV E/E' septal:  11.44  MV E/E' lateral:  7.94      XR CHEST PORTABLE    Result Date: 7/19/2022  EXAMINATION: ONE XRAY VIEW OF THE CHEST 7/18/2022 11:49 pm COMPARISON: 08/24/2021 HISTORY: ORDERING SYSTEM PROVIDED HISTORY: chest pain TECHNOLOGIST PROVIDED HISTORY: Reason for exam:->chest pain Reason for Exam: CHEST PAIN Additional signs and symptoms: CHEST PAIN FINDINGS: Cardiac silhouette unremarkable lungs clear. Trachea midline. No pneumothorax. Sulci sharp. Bones intact. No acute cardiopulmonary process. US RETROPERITONEAL COMPLETE    Result Date: 7/19/2022  EXAMINATION: RETROPERITONEAL ULTRASOUND OF THE KIDNEYS AND URINARY BLADDER 7/19/2022 COMPARISON: Ultrasound on 05/22/2018 HISTORY: Acute kidney injury. Possible BPH. FINDINGS: Kidneys: The right kidney measures 10.1 cm in length and the left kidney measures 10.0 cm in length. Kidneys demonstrate normal cortical echogenicity. Mild dilatation of the bilateral renal collecting systems. No focal lesions. Bladder: Mild bladder wall thickening. Ureteral jets are seen. Prostate is markedly enlarged, measuring up to 5.8 cm, and protrudes into the bladder base. Prevoid volume of 868 mL with significant postvoid residual of 485 mL. Markedly enlarged prostate measuring up to 5.8 cm with evidence for chronic bladder outlet obstruction given mild bladder wall thickening, significant post-void urinary residual, and mild dilatation of the renal collecting systems.        CBC:   Recent Labs     07/18/22 2330 07/19/22  0543   WBC 9.0 7.3   HGB 17.1 14.5    226     BMP:    Recent Labs     07/18/22 2330 07/19/22  0543    141   K 4.2 3.9    108   CO2 25 24   BUN 18 18   CREATININE 1.9* 1.7*   GLUCOSE 107* 135*     Hepatic:   Recent Labs     07/18/22 2330 07/19/22  0543   AST 37 39*   ALT 44* 35   BILITOT 0.3 0.3   ALKPHOS 105 98     Lipids:   Lab Results   Component Value Date/Time    CHOL 136 07/19/2022 05:43 AM    CHOL 157 08/01/2019 09:32 AM    HDL 31 07/19/2022 05:43 AM    TRIG 180 07/19/2022 05:43 AM     Hemoglobin A1C:   Lab Results   Component Value Date/Time    LABA1C 5.7 07/19/2022 05:43 AM     TSH:   Lab Results   Component Value Date/Time    TSH 1.08 10/22/2014 10:41 AM     Troponin:   Lab Results   Component Value Date/Time    TROPONINT 0.013 07/18/2022 11:30 PM    TROPONINT <0.010 08/25/2021 10:00 AM    TROPONINT <0.010 08/25/2021 03:54 AM     Lactic Acid: No results for input(s): LACTA in the last 72 hours. BNP: No results for input(s): PROBNP in the last 72 hours. UA:  Lab Results   Component Value Date/Time    NITRU POSITIVE 07/19/2022 08:02 AM    NITRU Negative 09/22/2017 11:33 AM    COLORU YELLOW 07/19/2022 08:02 AM    PHUR 6.5 08/01/2019 09:23 AM    PHUR 6.0 09/22/2017 11:33 AM    WBCUA 11 07/19/2022 08:02 AM    RBCUA 7 07/19/2022 08:02 AM    MUCUS RARE 07/19/2022 08:02 AM    TRICHOMONAS NONE SEEN 07/19/2022 08:02 AM    BACTERIA MODERATE 07/19/2022 08:02 AM    CLARITYU CLEAR 07/19/2022 08:02 AM    SPECGRAV 1.015 07/19/2022 08:02 AM    LEUKOCYTESUR NEGATIVE 07/19/2022 08:02 AM    UROBILINOGEN 0.2 07/19/2022 08:02 AM    BILIRUBINUR NEGATIVE 07/19/2022 08:02 AM    BILIRUBINUR negative 08/01/2019 09:23 AM    BLOODU SMALL 07/19/2022 08:02 AM    GLUCOSEU negative 08/01/2019 09:23 AM    GLUCOSEU Negative 09/22/2017 11:33 AM    KETUA NEGATIVE 07/19/2022 08:02 AM     Urine Cultures:   Lab Results   Component Value Date/Time    LABURIN  08/01/2019 09:32 AM     <50,000 CFU/ml mixed skin/urogenital steven.  No further workup     Blood Cultures: No results found for: BC  No results found for: BLOODCULT2  Organism: No results found for: ORG    Time Spent Discharging patient 32 minutes    Electronically signed by JASPAL Condon CNP on 7/20/2022 at 2:46 PM

## 2022-07-20 NOTE — DISCHARGE INSTRUCTIONS
Get CMP, Mag, and Phos prior to Nephrology appointment in 1 week. Send results to Dr. Iglesia Hobbs.

## 2022-07-20 NOTE — CARE COORDINATION
Received notice for OP Pharmacy that discharge scripts were sent down. Issued 1x voucher for new medications only. Pharmacy used a coupon 1 month free brilinta. Please advise patient to use GoodRx for his refills. However the Brilinta is $450 (average) a month even with GoodRx.       Patient placed on flagged list.

## 2022-07-20 NOTE — PROGRESS NOTES
Pt requesting fluids be stopped so he isnt up all night peeing. Pt has had 1000 urine output so far this shift.  PS Nephro

## 2022-07-20 NOTE — FLOWSHEET NOTE
Patient discharged to personal vehicle via wheelchair with prescriptions, paperwork & personal possessions in hand.

## 2022-07-20 NOTE — PROGRESS NOTES
Nephrology Progress Note  7/20/2022 10:09 AM        Subjective:   Admit Date: 7/18/2022  PCP: Shelly Carbajal MD    Interval History: Underwent cardiac cath yesterday with intervention    Diet: Reasonable    ROS: Nitroglycerin drip  Had high phosphorus and residual  Declined Sexton catheter  No shortness of breath or chest pain  Urine output recorded 3.65 L for the last 24 hours  Manual blood pressure 170/100     Procedure Summary-heart cath        mid LAD stent is occluded ( with Collaterals from Left to right   )   D1 80% stenosis in proximal segment   Attempted to wire LAD (due to EKG changes and ongoing chest   pain) - Wire could not canulate LAD - abandoned    revascularization      Mid RCA calcified stenosis 70% , Rt PDA gives off collaterals to   mid LAD   Due to ongoing chest pain, s/p PTCA 2.5/15 followed by PCI   3.0/15 mm @ 16 ISAIAS (   3.1 mm)   Final: 0% residual stenosis , DB III flow, no complications   Post stent diffuse 40% stenosis        Data:     Current meds:    lisinopril  40 mg Oral Daily    chlorthalidone  25 mg Oral Daily    tamsulosin  0.8 mg Oral Daily    amLODIPine  10 mg Oral Daily    finasteride  5 mg Oral Daily    sertraline  25 mg Oral Daily    enoxaparin  30 mg SubCUTAneous BID    sodium chloride flush  5-40 mL IntraVENous 2 times per day    aspirin  81 mg Oral Daily    metoprolol tartrate  25 mg Oral BID    atorvastatin  80 mg Oral Nightly    ticagrelor  90 mg Oral BID      sodium chloride      nitroGLYCERIN Stopped (07/20/22 0725)         I/O last 3 completed shifts: In: 1336.9 [P.O.:240;  I.V.:1096.9]  Out: 4400 [Urine:4400]    CBC:   Recent Labs     07/18/22  2330 07/19/22  0543   WBC 9.0 7.3   HGB 17.1 14.5    226          Recent Labs     07/18/22  2330 07/19/22  0543    141   K 4.2 3.9    108   CO2 25 24   BUN 18 18   CREATININE 1.9* 1.7*   GLUCOSE 107* 135*       Lab Results   Component Value Date    CALCIUM 8.6 07/19/2022    PHOS 2.9 07/19/2022 Objective:     Vitals: BP (!) 215/122   Pulse 60   Temp 97.9 °F (36.6 °C) (Oral)   Resp 21   Ht 6' (1.829 m)   Wt 255 lb 4.7 oz (115.8 kg)   SpO2 96%   BMI 34.62 kg/m² ,    General appearance: Alert, awake and oriented  HEENT: No gross conjunctival pallor  Neck: Seems supple  Lungs: Crackles on auscultation  Heart: Regular rate and rhythm  Abdomen: Soft, nontender  Extremities: No gross edema      Problem List :         Impression :     CKD stage IIa A1- urine did not have any active sediment and has minimal proteinuria-has prostatomegaly and bladder wall thickening-likely from intermittent bladder obstruction-risk factor is hypertension and atherosclerotic cardiovascular disease  Hypertension-manual blood pressure still 170/100-restart home medication and stop nitroglycerin drip  Coronary artery disease s/p intervention-see above for details of intervention    Recommendation/Plan  :     Add chlorthalidone-increase lisinopril to 40  Add Cardura to twice daily  Flomax 0.8 mg at bedtime  Keep Proscar  Discontinue nitroglycerin drip  If blood pressure come down to 160/90-after oral medication-May discharge  Follow-up with me in 1 week  CMP, magnesium, phosphorus prior to appointment  Amarilys Toro MD MD

## 2022-07-20 NOTE — PROGRESS NOTES
(11/2012); Coronary angioplasty with stent (06/01/2017); and cardiovascular stress test (05/2017). Social History:   reports that he has been smoking cigars and cigarettes. He has a 7.50 pack-year smoking history. He has never used smokeless tobacco. He reports that he does not currently use alcohol. He reports current drug use. Drug: Marijuana Sanjuanita Amble). Family history:  family history includes Alcohol Abuse in his brother; Alzheimer's Disease in his mother; Breast Cancer in his mother; Cancer in his brother; Coronary Art Dis in his father; Heart Attack (age of onset: 80) in his father; Heart Disease in his brother and brother; High Blood Pressure in his father; Hypertension in his brother and father; Learning Disabilities in his brother; Stroke in his father. No Known Allergies    Review of Systems:    All 14 systems were reviewed and are negative  Except for the positive findings  which as documented     /86   Pulse 55   Temp 97.9 °F (36.6 °C) (Oral)   Resp 10   Ht 6' (1.829 m)   Wt 255 lb 4.7 oz (115.8 kg)   SpO2 96%   BMI 34.62 kg/m²     Intake/Output Summary (Last 24 hours) at 7/20/2022 1247  Last data filed at 7/20/2022 1130  Gross per 24 hour   Intake 1346.86 ml   Output 3470 ml   Net -2123.14 ml     Physical Exam:  Constitutional:  Well developed, Well nourished, No acute distress, Non-toxic appearance. HENT:  Normocephalic, Atraumatic, Bilateral external ears normal, Oropharynx moist, No oral exudates, Nose normal. Neck- Normal range of motion, No tenderness, Supple, No stridor. Eyes:  PERRL, EOMI, Conjunctiva normal, No discharge. Respiratory:  Normal breath sounds, No respiratory distress, No wheezing, No chest tenderness. Cardiovascular:  Normal heart rate, Normal rhythm, No murmurs, No rubs, No gallops, JVP not elevated  Abdomen/GI:  Bowel sounds normal, Soft, No tenderness, No masses, No pulsatile masses.      Musculoskeletal:  Intact distal pulses, No edema, No tenderness, No cyanosis, No clubbing. Good range of motion in all major joints. No tenderness to palpation or major deformities noted. Back- No tenderness. Integument:  Warm, Dry, No erythema, No rash. Lymphatic:  No lymphadenopathy noted. Neurologic:  Alert & oriented x 3, Normal motor function, Normal sensory function, No focal deficits noted. Psychiatric:  Affect  and  Mood :no change    Medications:    lisinopril  40 mg Oral Daily    chlorthalidone  25 mg Oral Daily    tamsulosin  0.8 mg Oral Daily    doxazosin  2 mg Oral 2 times per day    metoprolol tartrate  50 mg Oral BID    isosorbide mononitrate  30 mg Oral Daily    amLODIPine  10 mg Oral Daily    finasteride  5 mg Oral Daily    sertraline  25 mg Oral Daily    enoxaparin  30 mg SubCUTAneous BID    sodium chloride flush  5-40 mL IntraVENous 2 times per day    aspirin  81 mg Oral Daily    atorvastatin  80 mg Oral Nightly    ticagrelor  90 mg Oral BID      sodium chloride       nitroGLYCERIN, ondansetron **OR** ondansetron, acetaminophen **OR** acetaminophen, polyethylene glycol, sodium chloride flush, sodium chloride, morphine    Lab Data:  CBC:   Recent Labs     07/18/22 2330 07/19/22  0543   WBC 9.0 7.3   HGB 17.1 14.5   HCT 51.4 44.6   MCV 87.6 87.5    226     BMP:   Recent Labs     07/18/22 2330 07/19/22  0543 07/19/22  1837    141  --    K 4.2 3.9  --     108  --    CO2 25 24  --    PHOS  --   --  2.9   BUN 18 18  --    CREATININE 1.9* 1.7*  --      PT/INR:   Recent Labs     07/18/22 2330   PROTIME 12.4   INR 0.96     BNP:  No results for input(s): PROBNP in the last 72 hours. TROPONIN:   Recent Labs     07/18/22 2330   TROPONINT 0.013*        ECHO :   echocardiogram    Assessment:  57 y. o.year old who is admitted for  Chief Complaint   Patient presents with    Chest Pain    , active issues as noted below:  Impression:  Principal Problem:    Unstable angina ([address]  Resolved Problems:    * No resolved hospital problems.  *            All labs, medications and tests reviewed by myself , continue all other medications of all above medical condition listed as is except for changes mentioned above. Thank you very much for consult , please call with questions.     Scott Guerrero MD, MD 7/20/2022 12:47 PM

## 2022-07-20 NOTE — PROGRESS NOTES
Outpatient Pharmacy Progress Note for Meds-to-Beds    Total number of Prescriptions Filled: 7  The following medications were dispensed to the patient during the discharge process:  chlorthalidone (HYGROTON)   doxazosin (CARDURA)   isosorbide mononitrate (IMDUR)   tamsulosin (FLOMAX)   ticagrelor (BRILINTA)   atorvastatin (LIPITOR)   lisinopril (ZESTRIL)     Additional Documentation:  Medications given to nurse Ramya to provide to patient (picked up in [facility])  Med assist used to cover 6 out of the 7 medications.  coupon used for Devang Dow. Thank you for letting us serve your patients.   [facility]    [address], [address] [facility]    Phone: [phone]    Fax: [phone]

## 2022-07-20 NOTE — CARE COORDINATION
Consult received. Contacted [facility]; spoke to Jackie Newton. She will review chart for need.  Jackie Vela RN

## 2022-07-25 ENCOUNTER — TELEPHONE (OUTPATIENT)
Dept: CARDIOLOGY CLINIC | Age: 62
End: 2022-07-25

## 2022-07-25 DIAGNOSIS — I73.9 CLAUDICATION (HCC): ICD-10-CM

## 2022-07-25 DIAGNOSIS — E78.00 PURE HYPERCHOLESTEROLEMIA: ICD-10-CM

## 2022-07-25 DIAGNOSIS — E78.5 DYSLIPIDEMIA: ICD-10-CM

## 2022-07-25 DIAGNOSIS — I25.110 CORONARY ARTERY DISEASE INVOLVING NATIVE CORONARY ARTERY OF NATIVE HEART WITH UNSTABLE ANGINA PECTORIS (HCC): ICD-10-CM

## 2022-07-25 RX ORDER — METOPROLOL TARTRATE 50 MG/1
50 TABLET, FILM COATED ORAL 2 TIMES DAILY
Qty: 180 TABLET | Refills: 1 | Status: SHIPPED | OUTPATIENT
Start: 2022-07-25 | End: 2022-08-11 | Stop reason: SDUPTHER

## 2022-07-25 RX ORDER — METOPROLOL TARTRATE 50 MG/1
TABLET, FILM COATED ORAL
Qty: 180 TABLET | Refills: 0 | Status: SHIPPED | OUTPATIENT
Start: 2022-07-25

## 2022-07-25 RX ORDER — FINASTERIDE 5 MG/1
5 TABLET, FILM COATED ORAL DAILY
Qty: 90 TABLET | Refills: 1 | Status: SHIPPED | OUTPATIENT
Start: 2022-07-25

## 2022-07-25 NOTE — TELEPHONE ENCOUNTER
Returned call to patient he states he believes it to be either Imdur or Hygroton. He is very dizziness, diarrah and fatigue after he take medication. Will advise Dr Mamie Hill and call him back. He voiced understanding    Advised patient per [facility] CNP to stop Chlorthalidone (Hygroton) and keep follow up with her tomorrow.  He voiced understanding

## 2022-07-25 NOTE — TELEPHONE ENCOUNTER
Patient called he was just discharged had STEMI , he stated that the medications he was placed on are making him very dizzy / and has diarrhea

## 2022-07-26 ENCOUNTER — OFFICE VISIT (OUTPATIENT)
Dept: CARDIOLOGY CLINIC | Age: 62
End: 2022-07-26

## 2022-07-26 VITALS
BODY MASS INDEX: 33.46 KG/M2 | HEART RATE: 50 BPM | DIASTOLIC BLOOD PRESSURE: 62 MMHG | RESPIRATION RATE: 18 BRPM | SYSTOLIC BLOOD PRESSURE: 80 MMHG | HEIGHT: 72 IN | WEIGHT: 247 LBS

## 2022-07-26 DIAGNOSIS — R42 DRUG-INDUCED DIZZINESS: ICD-10-CM

## 2022-07-26 DIAGNOSIS — T50.905A DRUG-INDUCED DIZZINESS: ICD-10-CM

## 2022-07-26 DIAGNOSIS — E66.9 OBESITY (BMI 30.0-34.9): ICD-10-CM

## 2022-07-26 DIAGNOSIS — Z09 HOSPITAL DISCHARGE FOLLOW-UP: ICD-10-CM

## 2022-07-26 DIAGNOSIS — I25.110 CORONARY ARTERY DISEASE INVOLVING NATIVE CORONARY ARTERY OF NATIVE HEART WITH UNSTABLE ANGINA PECTORIS (HCC): ICD-10-CM

## 2022-07-26 DIAGNOSIS — R07.2 PRECORDIAL PAIN: Primary | ICD-10-CM

## 2022-07-26 PROCEDURE — 93000 ELECTROCARDIOGRAM COMPLETE: CPT | Performed by: NURSE PRACTITIONER

## 2022-07-26 PROCEDURE — 99214 OFFICE O/P EST MOD 30 MIN: CPT | Performed by: NURSE PRACTITIONER

## 2022-07-26 PROCEDURE — 1111F DSCHRG MED/CURRENT MED MERGE: CPT | Performed by: NURSE PRACTITIONER

## 2022-07-26 RX ORDER — TAMSULOSIN HYDROCHLORIDE 0.4 MG/1
0.8 CAPSULE ORAL NIGHTLY
Qty: 30 CAPSULE | Refills: 3 | Status: SHIPPED | OUTPATIENT
Start: 2022-07-26

## 2022-07-26 RX ORDER — DOXAZOSIN 2 MG/1
2 TABLET ORAL NIGHTLY
Qty: 30 TABLET | Refills: 3 | Status: SHIPPED | OUTPATIENT
Start: 2022-07-26 | End: 2022-08-01 | Stop reason: ALTCHOICE

## 2022-07-26 ASSESSMENT — ENCOUNTER SYMPTOMS
COUGH: 0
SHORTNESS OF BREATH: 0
NAUSEA: 1
DIARRHEA: 1

## 2022-07-26 NOTE — PROGRESS NOTES
[facility] [place] PHYSICAL REHABILITATION Hiram  Abad Bishop  Phone: [phone]    Fax [phone]    Tenzin Zuñiga MD, Minh Clarke MD, [address] Le[address] Valenzuela MD, MD Gerri Wesley MD Brynda Ghazi, MD Trygve Fujisawa, MD Kendell How, JASPAL Self, JASPAL Davis, APRN     Chintan Lange, APRN  Noah Georges, [place]     CARDIOLOGY  NOTE    2022    Chet Wakefield (:  1960) is a 57 y.o. [place] Query established patient with Dr. Uche Puri, here for evaluation of the following chief complaint(s):  Follow-Up from Hospital (HFU- Increased dizziness w/syncopy with SOB- Pain goes through chest to back- x3wks )        SUBJECTIVE/OBJECTIVE:    HPI  Lindsey Smoker is a 57 y.o. who has history as noted below. He was recently in the hospital for MI. He has been having a lot of dizziness and feeling off since starting his new medications. He says chest pain episodes are better. Lindsey Paez was  Initially seen for nstemi and had  RCA intervention in 2017  concerning for unstable angina. He had repeat intervention of  In stent restenosis the right coronary artery and POBA of PDA  in May 2018, stress test in Aug 2012 showed no ischemia so we continued medical mangement for chest pain and angina  HE has had RCA intervention twice  since 2017 . He had LAD intervention in 2018 . Compliance and depression have danelle a concern          Review of Systems   Constitutional:  Negative for fatigue and fever. Respiratory:  Negative for cough and shortness of breath. Cardiovascular:  Negative for chest pain, palpitations and leg swelling. Gastrointestinal:  Positive for diarrhea and nausea. Musculoskeletal:  Negative for arthralgias and gait problem. Neurological:  Positive for dizziness and light-headedness. Negative for syncope, weakness and headaches.      Vitals:    22 1502   BP: 80/62   Site: Left Upper Arm   Position: Sitting   Cuff Size: Medium Adult   Pulse: 50   Resp: 18   Weight: 247 lb (112 kg)   Height: 6' (1.829 m)       Wt Readings from Last 3 Encounters:   07/26/22 247 lb (112 kg)   07/19/22 255 lb 4.7 oz (115.8 kg)   01/11/22 259 lb (117.5 kg)       BP Readings from Last 3 Encounters:   07/26/22 80/62   07/20/22 114/88   01/11/22 120/88       Prior to Admission medications    Medication Sig Start Date End Date Taking? Authorizing Provider   metoprolol tartrate (LOPRESSOR) 50 MG tablet TAKE 1 TABLET BY MOUTH TWICE DAILY HOLD  IF  SBP <110 AND HR <65 CALL CARDIOLOGY 7/25/22   JASPAL Olmstead CNP   metoprolol tartrate (LOPRESSOR) 50 MG tablet Take 1 tablet by mouth in the morning and 1 tablet before bedtime. Hold if SBP < 110 and HR <65 call cardiology. 7/25/22   Juan Carranza MD   finasteride (PROSCAR) 5 MG tablet Take 1 tablet by mouth in the morning. 7/25/22   Juan Carranza MD   isosorbide mononitrate (IMDUR) 30 MG extended release tablet Take 1 tablet by mouth in the morning. 7/21/22   JASPAL Ang CNP   atorvastatin (LIPITOR) 80 MG tablet Take 1 tablet by mouth nightly 7/20/22   JASPAL Ang CNP   doxazosin (CARDURA) 2 MG tablet Take 1 tablet by mouth in the morning and 1 tablet before bedtime. 7/20/22   JASPAL Ang CNP   lisinopril (PRINIVIL;ZESTRIL) 40 MG tablet Take 1 tablet by mouth in the morning. 7/21/22   JASPAL Ang CNP   chlorthalidone (HYGROTON) 25 MG tablet Take 1 tablet by mouth in the morning. 7/21/22   JASPAL Ang CNP   tamsulosin [facility]) 0.4 MG capsule Take 2 capsules by mouth in the morning. 7/21/22   JASPAL Ang CNP   ticagrelor (BRILINTA) 90 MG TABS tablet Take 1 tablet by mouth in the morning and 1 tablet before bedtime.  7/20/22   Venus Natha Aleksey, APRN - CNP   sertraline (ZOLOFT) 25 MG tablet Take 1 tablet by mouth daily 1/11/22   Juan Carranza MD   amLODIPine (NORVASC) 10 MG tablet Take 1 tablet by mouth daily Hold if Systolic BP <110 10/7/21   Belinda Pete MD   clopidogrel (PLAVIX) 75 MG tablet Take 1 tablet by mouth daily  Patient not taking: Reported on 7/19/2022 10/7/21 7/20/22  Belinda Pete MD   aspirin 81 MG tablet Take 1 tablet by mouth daily 8/26/21   JASPAL Simmons   nitroGLYCERIN (NITROSTAT) 0.4 MG SL tablet up to max of 3 total doses. If no relief after 1 dose, call 911. 8/26/21   JASPAL Simmons   cloNIDine (CATAPRES) 0.1 MG tablet Take 1 tablet by mouth 2 times daily  Patient not taking: Reported on 1/11/2022 8/26/21 7/20/22  JASPAL Simmons   Multiple Vitamins-Minerals (MENS MULTIPLE VITAMIN/LYCOPENE) TABS Take  by mouth. Historical Provider, MD       Physical Exam  Vitals reviewed. Constitutional:       General: He is not in acute distress. Appearance: Normal appearance. He is obese. He is not ill-appearing. HENT:      Head: Atraumatic. Neck:      Vascular: No carotid bruit. Cardiovascular:      Rate and Rhythm: Normal rate and regular rhythm. Pulses: Normal pulses. Heart sounds: Normal heart sounds. No murmur heard. Pulmonary:      Effort: Pulmonary effort is normal. No respiratory distress. Breath sounds: Normal breath sounds. Musculoskeletal:         General: No swelling or deformity. Cervical back: Neck supple. No muscular tenderness. Neurological:      Mental Status: He is alert.        Health Maintenance   Topic Date Due    Pneumococcal 0-64 years Vaccine (1 - PCV) Never done    Depression Monitoring  Never done    Colorectal Cancer Screen  Never done    Shingles vaccine (1 of 2) Never done    Prostate Specific Antigen (PSA) Screening or Monitoring  08/01/2020    DTaP/Tdap/Td vaccine (2 - Td or Tdap) 09/07/2020    COVID-19 Vaccine (3 - Booster for Pfizer series) 01/13/2022    Flu vaccine (1) 09/01/2022    A1C test (Diabetic or Prediabetic)  07/19/2023    Lipids  07/19/2023    Hepatitis C screen  Completed    HIV screen  Completed    Hepatitis A vaccine  Aged Out    Hepatitis B vaccine  Aged Out    Hib vaccine  Aged Out    Meningococcal (ACWY) vaccine  Aged Out       Lab Review   Lab Results   Component Value Date/Time    CHOL 136 07/19/2022 05:43 AM    CHOL 206 08/25/2021 12:00 AM    CHOL 157 08/01/2019 09:32 AM    CHOL 161 06/01/2017 04:14 AM    CHOL 199 08/04/2016 11:45 AM    CHOL 175 10/22/2014 10:41 AM    TRIG 180 07/19/2022 05:43 AM    TRIG 118 08/25/2021 12:00 AM    TRIG 81 08/01/2019 09:32 AM    HDL 31 07/19/2022 05:43 AM    HDL 33 08/25/2021 12:00 AM    HDL 38 08/01/2019 09:32 AM    LDLDIRECT 150 08/25/2021 12:00 AM    LDLDIRECT 117 06/01/2017 04:14 AM        Stress test 6/1/17  Summary    Abnormal Study. Mild to moderate inferior wall Ischemia of a large area. Dilated Cardiomyopathy with mildly reduced LV function. LVEF 43 %. Cath Conclusions 6/1/2017      Procedure Summary   s/p DAI to RCA for 90 % stenosis reduced to 0 % stenosis using a   Xience 2.7 X 32 stent followed by post stent balloon dilation   using a 2.75 X 15 balloon inflated to 20 sharon   LAD has mid 60 to 70 % stenosis   Circumflex is patent but distal OM branch has 90 % stenosis (   very small vessel )  LMCA: Normal.  LAD: Abnormal.mid segment has 50% to 60 % stenosis  LCx: Abnormal.distal OM 1 branch has 90 % stenosis, small calibre  RCA: Abnormal.mid RCA 90 % stenosis , proximal RCa has 20% to 30 % stenosis     Nuclear stress test 8/9/17  Abnormal study    Normal EF 42 % with normal ventricular contractility , normal wall motion    Medium size, Mild to moderate inferior ischemia noted . Abnormal stress images but increased GI Uptake in stress images          Cath 8/6/17  Procedure Summary   1. Mild to Moderate 3 vessel CAD, except a tiny sub branch of   last OM which is sub totally occluded with left to left   collaterals. 2. RCA stent is widely patent. 3. LV angiography not done due to abnormal Cr. LMCA: Normal.  LAD: Single stenosis. 50 % mid vessel stenosis.   LCx: Single stenosis. 50 % Ostial stenosis. Lesion on 3rd Ob Shelly: Distal subsection. 99% stenosis. Pre procedure DB I    flow was noted. Comments:Very small caliber sub branch with left to left collaterals   RCA: Focal stenosis. 40 % ostial stenosisThere is a previous stent on Mid RCA. There is a previous stent on Mid RCA Distal subsection showing wide patency. Cath 5/23/18  Procedure Summary   severe RCA stenosis, moderate LAD stenosis, s/p PCI of mid RCA   and PTCA of distal PDA      Angiographic Findings     Cardiac Arteries and Lesion Findings   LMCA: Normal.  LAD: initially appearing stenosis at mid LAD improved after NTG and guide+ BMW  wire injection, hence PCI aborted     Lesion on Mid LAD: 70% stenosis. LCx: Moderate Lumen Irregularity and Abnormal.distal OM1 has chronic subtotal  occlusion with collateral filling  RCA: mid RCA has 80% STENOSIS, distal PDA has diffuse stenosis and had wire  induced spasm,requiring PTCAThere is a previous stent on Mid RCA. There is a  previous stent on Mid RCA Distal subsection. Lesion on Mid RCA: 90% stenosis. Interventional procedure  Cardiac lesions  LAD:     Lesion on Mid LAD: 70% stenosis. RCA:     Lesion on Mid RCA: 90% stenosis. - 3.5 x 23 mm Xience Alpine Drug Eluting Stent. Diameter: 3.5 mm. Length:    23 mm. 3 inflation(s) to a max pressure of: 15 sharon. Lesion on R PDA: 99% stenosis. - 2.5 x 20 mm Trek RX Balloon. Diameter: 2.5 mm. Length: 20 mm. 5    inflation(s) to a max pressure of: 20 sharon. Cath 6/9/2018    Procedure Summary   Radial access, LVEDP was 12 mmHG   1. Successful PCI using 3.25 X 32 Xience stent for 80% mid LAD   lesion reduced to 0% stenosis. Ostial third diagonal was   pinched, but had DB-3 flow. Therefore, no further intervention   was done   2. RCA stents are patent   3. OM has proximal 50% stenosis   4.  RV marginal has 70-80% stenosis     Angiographic Findings    Diagnostic Findings    Cardiac Arteries and Lesion Findings   LMCA: Normal (no stenosis %) and No Angiographicalyl Significant Disease. LAD: Abnormal.mid segment has 80 % lesion, 3 diagonal branches , 2nd diagonal  has 50 % stenosis. hIgh grade 80 to 90 % stenosis at 3rd diagonal take off     Lesion on Mid LAD: 80% stenosis. LCx: Abnormal.ostial 40 % stenosis and , 1st OM has 40 to 50 % stenosis  RCA: Abnormal.Mid RCA has a stent which is patent, RV marginal has 80 % lesion  , PAD is patent and distal PLB branch is occludedThere is a previous stent on  Mid RCA. Echo 7/19/2022  Summary   Technically difficult study; definity used. Left ventricular systolic function is normal.   Ejection fraction is visually estimated at 50%. Apical akinesis with small apical aneurysm. Moderate left ventricular hypertrophy. No significant valvular disease noted. No evidence of any pericardial effusion. [facility] 7/2022  Diagnostic procedure:Left Heart Cath With Ventriculogram, Right   Coronary Angiography, Left Coronary Angiography      PCI procedure:DE Stent, RCA: DE Stent Plcmt Initl Vsl, Balloon   Angioplasty, RCA: Balloon Angioplasty Initl Vsl      Conclusions      Procedure Summary   Unstable Angina / Chest Pain ( Not a STEMI )      mid LAD stent is occluded ( with Collaterals from Left to right   )   D1 80% stenosis in proximal segment   Attempted to wire LAD (due to EKG changes and ongoing chest   pain) - Wire could not canulate LAD - abandoned    revascularization      Mid RCA calcified stenosis 70% , Rt PDA gives off collaterals to   mid LAD   Due to ongoing chest pain, s/p PTCA 2.5/15 followed by PCI   3.0/15 mm @ 16 ISAIAS (   3.1 mm)   Final: 0% residual stenosis , DB III flow, no complications   Post stent diffuse 40% stenosis     Angiographic Findings      Diagnostic Findings      Cardiac Arteries and Lesion Findings     LMCA: Normal.     LAD: Chronic occlusion. mid LAD stent is occluded ( with Collaterals from Left  to right )  D1 80% stenosis in proximal segment  Attempted to wire LAD (due to EKG changes and ongoing chest pain) - Wire could  not canulate LAD - abandoned  revascularization There is a previous stent  on Mid LAD. Lesion on Mid LAD: 100% stenosis. Devices used       - BMW Universal  cm Wire. Number of passes:    1. LCx: Abnormal.Ostial LCX 40% stenosis  Mild dx distally  Right dominance noted. RCA: Abnormal.Mid RCA calcified stenosis 70% , Rt PDA gives off collaterals to  mid LAD  Due to ongoing chest pain, s/p PTCA 2.5/15 followed by PCI 3.0/15 mm @ 16 ISAIAS  (   3.1 mm)  Final: 0% residual stenosis , DB III flow, no complications  Post stent diffuse 40% stenosis There is a previous stent on Mid RCA. There is  a previous stent on Mid RCA Distal subsection. There is a previous stent on  Mid RCA. Lesion on Mid RCA: 70% stenosis. Devices used       - BMW Universal  cm Wire. Number of passes: 1.       - 2.5 x 20 mm Euphora RX Balloon. Diameter: 2.5 mm. Length: 20 mm. 2    inflation(s) to a max pressure of: 16 isaias. - 3.0 x 15mm Resolute Oynx DAI. 1 inflation(s) to a max pressure of: 14    isaias. Interventional procedure  Cardiac lesions  LAD:       Lesion on Mid LAD: 100% stenosis. Devices used       - [employer] Universal  cm Wire. Number of passes:    1. RCA:       Lesion on Mid RCA: 70% stenosis. Devices used       - BMW Universal  cm Wire. Number of passes: 1.       - 2.5 x 20 mm Euphora RX Balloon. Diameter: 2.5 mm. Length: 20 mm. 2    inflation(s) to a max pressure of: 16 isaias. - 3.0 x 15mm Resolute Oynx DAI. 1 inflation(s) to a max pressure of: 14    isaias. ASSESSMENT/PLAN:      Coronary artery disease of native artery of native heart with stable angina pectoris [facility])  H/O PCI to RCA initially in June 2017, he had repeat PCI for in stent restenosis  in May 2018 but he continued to have symptoms at work so  HE had LAD intervention in June 2018 . still on plavix and aspirin  We continued  medical therapy with  metoprolol, Norvasc. After failing medical therapy. He presented again to the emergency department and had intervention done in May 2018. For now Continue DAPT for as long as possible  He was dizzy on ranexa? Imdur gave him headaches . He is on norvasc and metoprolol ( he doesnot want any furtehr med titration. Higher dose of metoprolol makes him tired. stress test  in Aug 2021 showed no ischemia. PCI of LAD due to ongoing CP. He always has chest pain. Claudication  Arterial doppler  normal in Feb 2020     Essential hypertension  Stop norvasc for now. Home all blood pressure medications except metoprolol tonight. In the morning use 1/2 tab metoprolol, take flomax at night and reduce cardura to 2 mg daily. Dyslipidemia :  Marlen Horan had lab work recently,  Lipid profile was reviewed with patient. continue lipitor 40 mg     Counseled extensively and medication compliance urged. We discussed that for the  prevention of ASCVD our  goal is aggressive risk modification. Patient is encouraged to exercise even a brisk walk for 30 minutes  at least 3 to 4 times a week   Various goals were discussed and questions answered. Continue current medications. Appropriate prescriptions are addressed and refills ordered. Questions answered and patient verbalizes understanding. Call for any problems, questions, or concerns. Return in about 1 week (around 8/2/2022). An electronic signature was used to authenticate this note.     Electronically signed by JASPAL Escalera CNP on 7/26/2022 at 11:02 AM

## 2022-08-01 ENCOUNTER — OFFICE VISIT (OUTPATIENT)
Dept: CARDIOLOGY CLINIC | Age: 62
End: 2022-08-01

## 2022-08-01 VITALS
BODY MASS INDEX: 33.97 KG/M2 | WEIGHT: 250.8 LBS | SYSTOLIC BLOOD PRESSURE: 100 MMHG | HEIGHT: 72 IN | OXYGEN SATURATION: 98 % | HEART RATE: 62 BPM | DIASTOLIC BLOOD PRESSURE: 70 MMHG

## 2022-08-01 DIAGNOSIS — R07.9 CHEST PAIN, UNSPECIFIED TYPE: Primary | ICD-10-CM

## 2022-08-01 DIAGNOSIS — I10 ESSENTIAL HYPERTENSION: ICD-10-CM

## 2022-08-01 PROCEDURE — 99214 OFFICE O/P EST MOD 30 MIN: CPT | Performed by: NURSE PRACTITIONER

## 2022-08-01 RX ORDER — LISINOPRIL 20 MG/1
20 TABLET ORAL 2 TIMES DAILY
Qty: 180 TABLET | Refills: 3 | Status: SHIPPED | OUTPATIENT
Start: 2022-08-01

## 2022-08-01 NOTE — PROGRESS NOTES
[address] [facility],Third [address]  Phone: [phone]    Fax [phone]                  Chelsey Kang MD, Tyler Nogueira MD, Parker Christianson MD, MD Maru Del Real MD Lenell Mango, MD Gabrielle Favorite, MD Carlos Tenorio, JASPAL Becerra, JASPAL Jones, JASPAL Dodd, JASPAL    BP Check Visit    Pt is here for a 2 week BP check. At the last office visit patient was found to have a blood pressure of 80/62. At that time the patient was instructed to stop norvasc, decrease cardura to 2 mg nightly. Make sure taking flomax at night. He is feeling better, but cotnieuts to feel dizzy and is havig chest pain. BP Readings from Last 3 Encounters:   08/01/22 100/70   07/26/22 80/62   07/20/22 114/88         Plan for pt is to stop Cardura. Check stress test next week. Call me Friday/ Monday with BP and how he is feeling. Follow up with Dr. Malia Victor in 2 weeks to review testing. Follow up in 2 weeks.       Pt is to report any dizziness or syncope to the office    Electronically signed by JASPAL Hector CNP on 8/1/2022 at 4:32 PM

## 2022-08-08 ENCOUNTER — TELEPHONE (OUTPATIENT)
Dept: CARDIOLOGY CLINIC | Age: 62
End: 2022-08-08

## 2022-08-08 DIAGNOSIS — K92.1 BLACK STOOLS: Primary | ICD-10-CM

## 2022-08-08 NOTE — TELEPHONE ENCOUNTER
Pt called and states he was to Speak with [facility] [place], about how meds are working. Also does pt need to keep 8/15 ov with vasiliy.  Please advise

## 2022-08-08 NOTE — TELEPHONE ENCOUNTER
Patient is having black stools and is having issues having an orgasm. He reports that he has an erection but no orgasm. He states that his blood pressure is better and he is feeling good. Check CBC. Call pcp about black stools. Encouraged patient to get stools sorted then will address the orgasms. Reviewed irregular blood pressure could cause ED and with darker stools he could have some GI issues going on.  He states undersatnding

## 2022-08-11 ENCOUNTER — OFFICE VISIT (OUTPATIENT)
Dept: FAMILY MEDICINE CLINIC | Age: 62
End: 2022-08-11

## 2022-08-11 VITALS
DIASTOLIC BLOOD PRESSURE: 82 MMHG | BODY MASS INDEX: 34.62 KG/M2 | HEIGHT: 72 IN | OXYGEN SATURATION: 99 % | WEIGHT: 255.6 LBS | HEART RATE: 62 BPM | TEMPERATURE: 97.2 F | SYSTOLIC BLOOD PRESSURE: 130 MMHG

## 2022-08-11 DIAGNOSIS — Z09 HOSPITAL DISCHARGE FOLLOW-UP: Primary | ICD-10-CM

## 2022-08-11 DIAGNOSIS — I10 ESSENTIAL HYPERTENSION: ICD-10-CM

## 2022-08-11 DIAGNOSIS — N18.32 STAGE 3B CHRONIC KIDNEY DISEASE (HCC): ICD-10-CM

## 2022-08-11 DIAGNOSIS — F51.04 PSYCHOPHYSIOLOGIC INSOMNIA: ICD-10-CM

## 2022-08-11 DIAGNOSIS — I25.10 ASCVD (ARTERIOSCLEROTIC CARDIOVASCULAR DISEASE): ICD-10-CM

## 2022-08-11 DIAGNOSIS — F32.1 CURRENT MODERATE EPISODE OF MAJOR DEPRESSIVE DISORDER WITHOUT PRIOR EPISODE (HCC): ICD-10-CM

## 2022-08-11 DIAGNOSIS — R73.03 PREDIABETES: ICD-10-CM

## 2022-08-11 PROCEDURE — 1111F DSCHRG MED/CURRENT MED MERGE: CPT | Performed by: FAMILY MEDICINE

## 2022-08-11 PROCEDURE — 99214 OFFICE O/P EST MOD 30 MIN: CPT | Performed by: FAMILY MEDICINE

## 2022-08-11 RX ORDER — DOXAZOSIN MESYLATE 4 MG/1
4 TABLET ORAL 2 TIMES DAILY
COMMUNITY

## 2022-08-11 RX ORDER — TRAZODONE HYDROCHLORIDE 50 MG/1
50 TABLET ORAL NIGHTLY
Qty: 30 TABLET | Refills: 0 | Status: SHIPPED | OUTPATIENT
Start: 2022-08-11

## 2022-08-11 SDOH — ECONOMIC STABILITY: FOOD INSECURITY: WITHIN THE PAST 12 MONTHS, YOU WORRIED THAT YOUR FOOD WOULD RUN OUT BEFORE YOU GOT MONEY TO BUY MORE.: NEVER TRUE

## 2022-08-11 SDOH — ECONOMIC STABILITY: FOOD INSECURITY: WITHIN THE PAST 12 MONTHS, THE FOOD YOU BOUGHT JUST DIDN'T LAST AND YOU DIDN'T HAVE MONEY TO GET MORE.: NEVER TRUE

## 2022-08-11 ASSESSMENT — PATIENT HEALTH QUESTIONNAIRE - PHQ9
6. FEELING BAD ABOUT YOURSELF - OR THAT YOU ARE A FAILURE OR HAVE LET YOURSELF OR YOUR FAMILY DOWN: 3
SUM OF ALL RESPONSES TO PHQ QUESTIONS 1-9: 21
10. IF YOU CHECKED OFF ANY PROBLEMS, HOW DIFFICULT HAVE THESE PROBLEMS MADE IT FOR YOU TO DO YOUR WORK, TAKE CARE OF THINGS AT HOME, OR GET ALONG WITH OTHER PEOPLE: 3
SUM OF ALL RESPONSES TO PHQ QUESTIONS 1-9: 21
7. TROUBLE CONCENTRATING ON THINGS, SUCH AS READING THE NEWSPAPER OR WATCHING TELEVISION: 3
4. FEELING TIRED OR HAVING LITTLE ENERGY: 3
8. MOVING OR SPEAKING SO SLOWLY THAT OTHER PEOPLE COULD HAVE NOTICED. OR THE OPPOSITE, BEING SO FIGETY OR RESTLESS THAT YOU HAVE BEEN MOVING AROUND A LOT MORE THAN USUAL: 3
5. POOR APPETITE OR OVEREATING: 3
2. FEELING DOWN, DEPRESSED OR HOPELESS: 1
3. TROUBLE FALLING OR STAYING ASLEEP: 3
9. THOUGHTS THAT YOU WOULD BE BETTER OFF DEAD, OR OF HURTING YOURSELF: 0
SUM OF ALL RESPONSES TO PHQ9 QUESTIONS 1 & 2: 3
1. LITTLE INTEREST OR PLEASURE IN DOING THINGS: 2

## 2022-08-11 ASSESSMENT — SOCIAL DETERMINANTS OF HEALTH (SDOH): HOW HARD IS IT FOR YOU TO PAY FOR THE VERY BASICS LIKE FOOD, HOUSING, MEDICAL CARE, AND HEATING?: NOT VERY HARD

## 2022-08-11 NOTE — PATIENT INSTRUCTIONS
Apply ice or cold pack 20 minutes/ hour  Salon pas    Lidocaine patches or cream  Aspercreme, BenGay, IcyHot, Capsaicin etc.  Tumeric capsules  Tylenol 1000mg 3 times per day

## 2022-08-11 NOTE — PROGRESS NOTES
Post-Discharge Transitional Care  Follow Up      Sarai Root   YOB: 1960    Date of Office Visit:  2022  Date of Hospital Admission: 22  Date of Hospital Discharge: 22  Risk of hospital readmission (high >=14%. Medium >=10%) :Readmission Risk Score: 11.1      Care management risk score Rising risk (score 2-5) and Complex Care (Scores >=6): No Risk Score On File     Non face to face  following discharge, date last encounter closed (first attempt may have been earlier): *No documented post hospital discharge outreach found in the last 14 days    Call initiated 2 business days of discharge: *No response recorded in the last 14 days    ASSESSMENT/PLAN:   Hospital discharge follow-up  Current moderate episode of major depressive disorder without prior episode (HCC)  -     traZODone (DESYREL) 50 MG tablet; Take 1 tablet by mouth nightly, Disp-30 tablet, R-0Normal  Prediabetes  ASCVD (arteriosclerotic cardiovascular disease)  Essential hypertension  Psychophysiologic insomnia  -     traZODone (DESYREL) 50 MG tablet; Take 1 tablet by mouth nightly, Disp-30 tablet, R-0Normal  Stage 3b chronic kidney disease [facility])      Medical Decision Making: high complexity  Return in about 2 weeks (around 2022) for Insomnia, Depression, Doxy. Subjective:   HPI:  Follow up of Hospital problems/diagnosis(es): STEMI. Has already f/u cardiology. He says he has nitro for when he has chest pain. Has stress test next week    Inpatient course: Discharge summary reviewed- see chart. Interval history/Current status: feels tired and just prefers to be alone. Doesn't answer the door when family come over. Had not eaten breakfast or dinner from yesterday. Doesn't want to go to grocery store. No thoughts of suicide. Does not sleep well at night. Multiple family members have recently . Having some chest pain still  Had some black stools recently and told cardiology about it.   The black stools have now gone away. Patient Active Problem List   Diagnosis    Benign prostatic hyperplasia    Essential hypertension    Pure hypercholesterolemia    Obesity (BMI 30.0-34. 9)    Coronary artery disease of native artery of native heart with stable angina pectoris ([facility] [address]    Dyslipidemia    Depression    Coronary artery disease involving native coronary artery of native heart with unstable angina pectoris (HCC)    Osteoarthritis of lumbar spine    Chronic pain    S/P PTCA (percutaneous transluminal coronary angioplasty)    ASCVD (arteriosclerotic cardiovascular disease)    Creatinine elevation    Pain in both lower legs    Claudication (HCC)    Chest pain    Unstable angina (HCC)    Prediabetes    Stage 3b chronic kidney disease ([facility] [address]       Medications listed as ordered at the time of discharge from hospital     Medication List            Accurate as of August 11, 2022 12:44 PM. If you have any questions, ask your nurse or doctor. START taking these medications      traZODone 50 MG tablet  Commonly known as: DESYREL  Take 1 tablet by mouth nightly  Started by: Nimisha Chung MD            CONTINUE taking these medications      aspirin 81 MG tablet  Take 1 tablet by mouth daily     atorvastatin 80 MG tablet  Commonly known as: LIPITOR  Take 1 tablet by mouth nightly     doxazosin 4 MG tablet  Commonly known as: CARDURA     finasteride 5 MG tablet  Commonly known as: Proscar  Take 1 tablet by mouth in the morning. isosorbide mononitrate 30 MG extended release tablet  Commonly known as: IMDUR  Take 1 tablet by mouth in the morning.      lisinopril 20 MG tablet  Commonly known as: PRINIVIL;ZESTRIL  Take 1 tablet by mouth in the morning and at bedtime     Mens Multiple Vitamin/Lycopene Tabs     metoprolol tartrate 50 MG tablet  Commonly known as: LOPRESSOR  TAKE 1 TABLET BY MOUTH TWICE DAILY HOLD  IF  SBP <110 AND HR <65 CALL CARDIOLOGY     nitroGLYCERIN 0.4 MG SL tablet  Commonly known as: 0.4 MG SL tablet up to max of 3 total doses. If no relief after 1 dose, call 911. 25 tablet 0    Multiple Vitamins-Minerals (MENS MULTIPLE VITAMIN/LYCOPENE) TABS Take  by mouth. Medications patient taking as of now reconciled against medications ordered at time of hospital discharge: Yes        Objective:    /82 (Site: Left Upper Arm, Position: Sitting, Cuff Size: Large Adult)   Pulse 62   Temp 97.2 °F (36.2 °C) (Temporal)   Ht 6' (1.829 m)   Wt 255 lb 9.6 oz (115.9 kg)   SpO2 99%   BMI 34.67 kg/m²   General Appearance: alert and oriented to person, place and time, well developed and well- nourished, in no acute distress,  obese. Appears tired  Skin: warm and dry, no rash or erythema  Head: normocephalic and atraumatic  Eyes: pupils equal, round, and reactive to light, extraocular eye movements intact, conjunctivae normal  ENT: tympanic membrane, external ear and ear canal normal bilaterally, nose without deformity, nasal mucosa and turbinates normal without polyps  Neck: supple and non-tender without mass, no thyromegaly or thyroid nodules, no cervical lymphadenopathy  Pulmonary/Chest: clear to auscultation bilaterally- no wheezes, rales or rhonchi, normal air movement, no respiratory distress  Cardiovascular: normal rate, regular rhythm, normal S1 and S2, no murmurs, rubs, clicks, mild chest wall tenderness  Abdomen: soft, non-tender, non-distended, normal bowel sounds, no masses or organomegaly  Extremities: no cyanosis, clubbing or edema  Musculoskeletal: normal range of motion, no joint swelling, deformity or tenderness  Neurologic: , no cranial nerve deficit, gait, coordination and speech normal  Psych: depressed, tired       Diagnosis Orders   1. Hospital discharge follow-up        2. Current moderate episode of major depressive disorder without prior episode (HCC)  traZODone (DESYREL) 50 MG tablet      3. Prediabetes        4. ASCVD (arteriosclerotic cardiovascular disease)        5. Essential hypertension        6. Psychophysiologic insomnia  traZODone (DESYREL) 50 MG tablet      7. Stage 3b chronic kidney disease ([facility] [address]          Patient verbalized that he is not suicidal.    We will start trazodone for his insomnia and depression. Recheck virtually in 2 weeks. Warned patient about his chronic kidney disease. He did not know that he was supposed to avoid anti-inflammatories. I repeated the list of medications that he should not take for pain. He can take Tylenol for pain. Follow-up with cardiology    Asked him to try to eat a breakfast and a lunch and a dinner meal.    Hypertension stable. An electronic signature was used to authenticate this note.   --Millie Granados MD

## 2022-08-25 ENCOUNTER — PROCEDURE VISIT (OUTPATIENT)
Dept: CARDIOLOGY CLINIC | Age: 62
End: 2022-08-25

## 2022-08-25 DIAGNOSIS — R07.9 CHEST PAIN, UNSPECIFIED TYPE: ICD-10-CM

## 2022-08-25 DIAGNOSIS — I42.9 CARDIOMYOPATHY, UNSPECIFIED TYPE (HCC): ICD-10-CM

## 2022-08-25 DIAGNOSIS — R06.02 SOB (SHORTNESS OF BREATH): Primary | ICD-10-CM

## 2022-08-25 LAB
LV EF: 47 %
LVEF MODALITY: NORMAL

## 2022-08-25 PROCEDURE — 93016 CV STRESS TEST SUPVJ ONLY: CPT | Performed by: INTERNAL MEDICINE

## 2022-08-25 PROCEDURE — 78452 HT MUSCLE IMAGE SPECT MULT: CPT | Performed by: INTERNAL MEDICINE

## 2022-08-25 PROCEDURE — A9500 TC99M SESTAMIBI: HCPCS | Performed by: INTERNAL MEDICINE

## 2022-08-25 PROCEDURE — 93018 CV STRESS TEST I&R ONLY: CPT | Performed by: INTERNAL MEDICINE

## 2022-08-25 PROCEDURE — 93017 CV STRESS TEST TRACING ONLY: CPT | Performed by: INTERNAL MEDICINE

## 2022-08-26 ENCOUNTER — OFFICE VISIT (OUTPATIENT)
Dept: FAMILY MEDICINE CLINIC | Age: 62
End: 2022-08-26

## 2022-08-26 VITALS
HEART RATE: 50 BPM | WEIGHT: 258 LBS | TEMPERATURE: 97.3 F | OXYGEN SATURATION: 98 % | SYSTOLIC BLOOD PRESSURE: 120 MMHG | HEIGHT: 72 IN | BODY MASS INDEX: 34.95 KG/M2 | DIASTOLIC BLOOD PRESSURE: 84 MMHG

## 2022-08-26 DIAGNOSIS — F51.04 PSYCHOPHYSIOLOGIC INSOMNIA: Primary | ICD-10-CM

## 2022-08-26 DIAGNOSIS — F32.1 CURRENT MODERATE EPISODE OF MAJOR DEPRESSIVE DISORDER WITHOUT PRIOR EPISODE (HCC): ICD-10-CM

## 2022-08-26 DIAGNOSIS — Z59.9 FINANCIAL DIFFICULTIES: ICD-10-CM

## 2022-08-26 DIAGNOSIS — R60.0 PEDAL EDEMA: ICD-10-CM

## 2022-08-26 DIAGNOSIS — R06.02 SOB (SHORTNESS OF BREATH): ICD-10-CM

## 2022-08-26 PROCEDURE — 99214 OFFICE O/P EST MOD 30 MIN: CPT | Performed by: FAMILY MEDICINE

## 2022-08-26 RX ORDER — POTASSIUM CHLORIDE 20 MEQ/1
20 TABLET, EXTENDED RELEASE ORAL DAILY
Qty: 30 TABLET | Refills: 1 | Status: SHIPPED | OUTPATIENT
Start: 2022-08-26

## 2022-08-26 RX ORDER — TRAZODONE HYDROCHLORIDE 100 MG/1
100 TABLET ORAL NIGHTLY
Qty: 30 TABLET | Refills: 0 | Status: SHIPPED | OUTPATIENT
Start: 2022-08-26

## 2022-08-26 RX ORDER — FUROSEMIDE 20 MG/1
20 TABLET ORAL DAILY
Qty: 30 TABLET | Refills: 1 | Status: SHIPPED | OUTPATIENT
Start: 2022-08-26

## 2022-08-26 RX ORDER — TRAZODONE HYDROCHLORIDE 100 MG/1
100 TABLET ORAL NIGHTLY
Qty: 30 TABLET | Refills: 0 | Status: SHIPPED | OUTPATIENT
Start: 2022-08-26 | End: 2022-08-26 | Stop reason: SDUPTHER

## 2022-08-26 SDOH — ECONOMIC STABILITY - INCOME SECURITY: PROBLEM RELATED TO HOUSING AND ECONOMIC CIRCUMSTANCES, UNSPECIFIED: Z59.9

## 2022-08-26 NOTE — PROGRESS NOTES
Patient ID: Manjula Perez 1960    . Chief Complaint   Patient presents with    Insomnia    Depression    Chest Pain     Having some chest pain still says been going on for awhile        Leg Swelling     Both leg swelling going on for 1 month goes and goes          HPI    Insomnia: Now able to sleep for about 5 hours instead of 2 or 3. Wakes up earlier than he would like. Depression: Still depressed. Is taking the trazodone. No thoughts of suicide but just wants to sleep. Chest pain and shortness of breath: He just had a stress test done yesterday. He has not gotten the results back. He sees cardiology for follow-up in a week and a half. He is wondering if he can move up his appointment. He gets short of breath with walking small distances. Leg edema: In the last month has noted that his legs are swelling more. Denies any increased salt consumption. He does sit upright a lot. Financial difficulties: Is without insurance. He is worried about his long term savings. He has been using of his long term savings to pay for his medical bills. Review of Systems    Patient Active Problem List   Diagnosis    Benign prostatic hyperplasia    Essential hypertension    Pure hypercholesterolemia    Obesity (BMI 30.0-34. 9)    Coronary artery disease of native artery of native heart with stable angina pectoris ([address]    Dyslipidemia    Depression    Coronary artery disease involving native coronary artery of native heart with unstable angina pectoris (HCC)    Osteoarthritis of lumbar spine    Chronic pain    S/P PTCA (percutaneous transluminal coronary angioplasty)    ASCVD (arteriosclerotic cardiovascular disease)    Creatinine elevation    Pain in both lower legs    Claudication (HCC)    Chest pain    Unstable angina (HCC)    Prediabetes    Stage 3b chronic kidney disease ([address]       Past Surgical History:   Procedure Laterality Date    CARDIOVASCULAR STRESS TEST  05/2017    inferior ischemia CORONARY ANGIOPLASTY WITH STENT PLACEMENT  2017    60% stenosis    OTHER SURGICAL HISTORY  2012    thyroid biopsy--benign    SHOULDER ARTHROSCOPY Right     R shoulder scope partial rot cuff repair       Family History   Problem Relation Age of Onset    Coronary Art Dis Father     Hypertension Father     Stroke Father     High Blood Pressure Father     Heart Attack Father 80            Breast Cancer Mother     Alzheimer's Disease Mother          80    Alcohol Abuse Brother     Cancer Brother         type unknown    Hypertension Brother     Learning Disabilities Brother     Heart Disease Brother     Heart Disease Brother        Current Outpatient Medications on File Prior to Visit   Medication Sig Dispense Refill    ticagrelor (BRILINTA) 90 MG TABS tablet Take 1 tablet by mouth 2 times daily 48 tablet 0    doxazosin (CARDURA) 4 MG tablet Take 4 mg by mouth in the morning and 4 mg in the evening. 2 tablets twice a day. traZODone (DESYREL) 50 MG tablet Take 1 tablet by mouth nightly 30 tablet 0    lisinopril (PRINIVIL;ZESTRIL) 20 MG tablet Take 1 tablet by mouth in the morning and at bedtime 180 tablet 3    tamsulosin (FLOMAX) 0.4 MG capsule Take 2 capsules by mouth at bedtime 30 capsule 3    metoprolol tartrate (LOPRESSOR) 50 MG tablet TAKE 1 TABLET BY MOUTH TWICE DAILY HOLD  IF  SBP <110 AND HR <65 CALL CARDIOLOGY 180 tablet 0    finasteride (PROSCAR) 5 MG tablet Take 1 tablet by mouth in the morning. 90 tablet 1    isosorbide mononitrate (IMDUR) 30 MG extended release tablet Take 1 tablet by mouth in the morning. 30 tablet 3    atorvastatin (LIPITOR) 80 MG tablet Take 1 tablet by mouth nightly 30 tablet 3    aspirin 81 MG tablet Take 1 tablet by mouth daily 30 tablet 0    nitroGLYCERIN (NITROSTAT) 0.4 MG SL tablet up to max of 3 total doses. If no relief after 1 dose, call 911. 25 tablet 0    Multiple Vitamins-Minerals (MENS MULTIPLE VITAMIN/LYCOPENE) TABS Take  by mouth.       Hoda Klein clopidogrel (PLAVIX) 75 MG tablet Take 1 tablet by mouth daily (Patient not taking: Reported on 7/19/2022) 90 tablet 1    [DISCONTINUED] cloNIDine (CATAPRES) 0.1 MG tablet Take 1 tablet by mouth 2 times daily (Patient not taking: Reported on 1/11/2022) 60 tablet 0     No current facility-administered medications on file prior to visit. Objective:         Physical Exam  Vitals and nursing note reviewed. Constitutional:       Appearance: He is well-developed. He is morbidly obese. Comments: Hyperventilating on and off   HENT:      Head: Normocephalic and atraumatic. Cardiovascular:      Rate and Rhythm: Normal rate and regular rhythm. Heart sounds: Normal heart sounds, S1 normal and S2 normal.   Pulmonary:      Effort: No respiratory distress. Breath sounds: Normal breath sounds. No wheezing or rales. Musculoskeletal:      Right lower leg: Edema present. Left lower leg: Edema present. Skin:     General: Skin is warm and dry. Neurological:      Mental Status: He is alert. Psychiatric:         Attention and Perception: He is attentive. Mood and Affect: Mood is depressed. Speech: Speech normal.         Behavior: Behavior is slowed. Thought Content: Thought content normal. Thought content does not include suicidal plan. Vitals:    08/26/22 1141   BP: 120/84   Site: Left Upper Arm   Position: Sitting   Cuff Size: Large Adult   Pulse: 50   Temp: 97.3 °F (36.3 °C)   TempSrc: Temporal   SpO2: 98%   Weight: 258 lb (117 kg)   Height: 6' (1.829 m)     Body mass index is 34.99 kg/m². Wt Readings from Last 3 Encounters:   08/26/22 258 lb (117 kg)   08/11/22 255 lb 9.6 oz (115.9 kg)   08/01/22 250 lb 12.8 oz (113.8 kg)     BP Readings from Last 3 Encounters:   08/26/22 120/84   08/11/22 130/82   08/01/22 100/70          No results found for this visit on 08/26/22.   The ASCVD Risk score (Sonia Potter, et al., 2013) failed to calculate for the following reasons:     The patient has a prior MI or stroke diagnosis  Lab Review   Admission on 07/18/2022, Discharged on 07/20/2022   Component Date Value    WBC 07/18/2022 9.0     RBC 07/18/2022 5.87     Hemoglobin 07/18/2022 17.1     Hematocrit 07/18/2022 51.4     MCV 07/18/2022 87.6     MCH 07/18/2022 29.1     MCHC 07/18/2022 33.3     RDW 07/18/2022 18.9 (A)    Platelets 66/32/5421 242     MPV 07/18/2022 10.9     Differential Type 07/18/2022 AUTOMATED DIFFERENTIAL     Segs Relative 07/18/2022 56.4     Lymphocytes % 07/18/2022 30.0     Monocytes % 07/18/2022 10.2 (A)    Eosinophils % 07/18/2022 2.8     Basophils % 07/18/2022 0.4     Segs Absolute 07/18/2022 5.1     Lymphocytes Absolute 07/18/2022 2.7     Monocytes Absolute 07/18/2022 0.9     Eosinophils Absolute 07/18/2022 0.3     Basophils Absolute 07/18/2022 0.0     Nucleated RBC % 07/18/2022 0.0     Total Nucleated RBC 07/18/2022 0.0     Total Immature Neutrophil 07/18/2022 0.02     Immature Neutrophil % 07/18/2022 0.2     Sodium 07/18/2022 141     Potassium 07/18/2022 4.2     Chloride 07/18/2022 104     CO2 07/18/2022 25     Anion Gap 07/18/2022 12     BUN 07/18/2022 18     Creatinine 07/18/2022 1.9 (A)    Glucose 07/18/2022 107 (A)    Calcium 07/18/2022 9.2     GFR Non- 07/18/2022 36 (A)    GFR  07/18/2022 44 (A)    Albumin 07/18/2022 4.5     Total Bilirubin 07/18/2022 0.3     Bilirubin, Direct 07/18/2022 0.2     Bilirubin, Indirect 07/18/2022 0.1     Alkaline Phosphatase 07/18/2022 105     AST 07/18/2022 37     ALT 07/18/2022 44 (A)    Total Protein 07/18/2022 8.2     Lipase 07/18/2022 57     Troponin T 07/18/2022 0.013 (A)    Protime 07/18/2022 12.4     INR 07/18/2022 0.96     aPTT 07/18/2022 26.4     Activated Clotting Time,* 07/19/2022 252     Activated Clotting Time,* 07/19/2022 341     WBC 07/19/2022 7.3     RBC 07/19/2022 5.10     Hemoglobin 07/19/2022 14.5     Hematocrit 07/19/2022 44.6     MCV 07/19/2022 87.5     MCH 07/19/2022 28.4     MCHC 07/19/2022 32.5     RDW 07/19/2022 18.2 (A)    Platelets 15/02/2840 226     MPV 07/19/2022 10.4     Sodium 07/19/2022 141     Potassium 07/19/2022 3.9     Chloride 07/19/2022 108     CO2 07/19/2022 24     BUN 07/19/2022 18     Creatinine 07/19/2022 1.7 (A)    Glucose 07/19/2022 135 (A)    Calcium 07/19/2022 8.6     Albumin 07/19/2022 3.7     Total Protein 07/19/2022 6.3 (A)    Total Bilirubin 07/19/2022 0.3     ALT 07/19/2022 35     AST 07/19/2022 39 (A)    Alkaline Phosphatase 07/19/2022 98     GFR Non- 07/19/2022 41 (A)    GFR  07/19/2022 50 (A)    Anion Gap 07/19/2022 9     Hemoglobin A1C 07/19/2022 5.7     eAG 07/19/2022 117     Left Ventricular Ejectio* 07/19/2022 50     LVEF MODALITY 07/19/2022 ECHO     Ventricular Rate 07/19/2022 51     Atrial Rate 07/19/2022 51     P-R Interval 07/19/2022 150     QRS Duration 07/19/2022 92     Q-T Interval 07/19/2022 474     QTc Calculation (Bazett) 07/19/2022 436     P Axis 07/19/2022 52     R Axis 07/19/2022 28     T [place] 07/19/2022 106     Diagnosis 07/19/2022                      Value:Sinus bradycardia  Possible Left atrial enlargement  ST & T wave abnormality, consider anterolateral ischemia  Abnormal ECG  When compared with ECG of 18-JUL-2022 23:25,  ST no longer elevated in Anterior leads  Nonspecific T wave abnormality no longer evident in Inferior leads  T wave inversion now evident in Anterolateral leads  Confirmed by ROJELIO Allen ([id number]) on 7/19/2022 8:50:20 PM      Triglycerides 07/19/2022 180 (A)    Cholesterol 07/19/2022 136     HDL 07/19/2022 31 (A)    LDL Calculated 07/19/2022 69     Color, UA 07/19/2022 YELLOW     Clarity, UA 07/19/2022 CLEAR     Glucose, Urine 07/19/2022 NEGATIVE     Bilirubin Urine 07/19/2022 NEGATIVE     Ketones, Urine 07/19/2022 NEGATIVE     Specific Gravity, UA 07/19/2022 1.015     Blood, Urine 07/19/2022 SMALL (A)    pH, Urine 07/19/2022 7.5     Protein, UA 07/19/2022 NEGATIVE Urobilinogen, Urine 07/19/2022 0.2     Nitrite Urine, Quantitat* 07/19/2022 POSITIVE (A)    Leukocyte Esterase, Urine 07/19/2022 NEGATIVE     RBC, UA 07/19/2022 7 (A)    WBC, UA 07/19/2022 11 (A)    Bacteria, UA 07/19/2022 MODERATE (A)    Mucus, UA 07/19/2022 RARE (A)    Trichomonas, UA 07/19/2022 NONE SEEN     Sodium, Ur 07/19/2022 165     Urine Total Protein 07/19/2022 13.5 (A)    Creatinine, Ur 07/19/2022 74.1     Prot/Creat Ratio, Ur 07/19/2022 0.2 (A)    Ventricular Rate 07/18/2022 73     Atrial Rate 07/18/2022 73     P-R Interval 07/18/2022 144     QRS Duration 07/18/2022 88     Q-T Interval 07/18/2022 372     QTc Calculation (Bazett) 07/18/2022 409     P Axis 07/18/2022 62     R Axis 07/18/2022 33     T [place] 07/18/2022 100     Diagnosis 07/18/2022                      Value:Normal sinus rhythm  Possible Left atrial enlargement  ST elevation, consider anterior injury or acute infarct  ** ** ACUTE MI / STEMI ** **  Abnormal ECG  When compared with ECG of 25-AUG-2021 10:48,  Non-specific change in ST segment in Lateral leads  Nonspecific T wave abnormality now evident in Inferior leads  Nonspecific T wave abnormality, improved in Lateral leads  Confirmed by ROJELIO Soriano ([id number]) on 7/19/2022 8:50:12 PM      Magnesium 07/19/2022 1.8     Phosphorus 07/19/2022 2.9     Ventricular Rate 07/20/2022 47     Atrial Rate 07/20/2022 47     P-R Interval 07/20/2022 142     QRS Duration 07/20/2022 100     Q-T Interval 07/20/2022 536     QTc Calculation (Bazett) 07/20/2022 474     P Axis 07/20/2022 48     R Axis 07/20/2022 -9     T Axis 07/20/2022 146     Diagnosis 07/20/2022                      Value:Sinus bradycardia  Possible Left atrial enlargement  ST & Marked T wave abnormality, consider anterolateral ischemia  Prolonged QT  Abnormal ECG  When compared with ECG of 19-JUL-2022 05:12,  T wave inversion more evident in Lateral leads  Confirmed by Martín Soriano 62 ([phone]) on 7/20/2022 7:33:54 PM Assessment:       Diagnosis Orders   1. Psychophysiologic insomnia  traZODone (DESYREL) 100 MG tablet      2. SOB (shortness of breath)  Full PFT Study With Bronchodilator      3. Financial difficulties  [employer]      4. Pedal edema  furosemide (LASIX) 20 MG tablet    potassium chloride (KLOR-CON M) 20 MEQ extended release tablet      5. Current moderate episode of major depressive disorder without prior episode [facility])                Plan:      Patient to follow-up with cardiology. He plans on calling to see if he can get in to be seen sooner    Patient with depression. Increasing the trazodone dose to help with the insomnia. We will recheck him in 2 weeks. He is to go to the emergency room or call [phone] if he has mental health breakdown    Low salt diet        Return in about 12 days (around 9/7/2022) for Insomnia, Depression, Edema.

## 2022-08-26 NOTE — PATIENT INSTRUCTIONS
BRING YOUR MEDICATION BOTTLES TO ALL APPOINTMENTS    Check MY CHART for test results. If you have forgotten your password, call [phone]. The diagnoses and medications listed in this after visit summary may not be up to date. Check MY CHART in 28-48 hours forcorrections. Late cancellation policy: So that we can better accommodate people who are sick, please give our office 24 hour notice for an appointment cancellation. Missed appointments: Your care is very important to us. It is important that you keep your scheduled appointments. Multiple missed appointments will lead to a dismissal from the office. Patients arriving late will be worked into the schedule as time permits, with patients arriving on time taken as scheduled. Late arriving patients are more than welcome to wait or reschedule their appointments. Please allow 5-7 business days for paperwork to be processed.

## 2022-09-09 ENCOUNTER — OFFICE VISIT (OUTPATIENT)
Dept: CARDIOLOGY CLINIC | Age: 62
End: 2022-09-09

## 2022-09-09 VITALS
WEIGHT: 259 LBS | HEIGHT: 72 IN | DIASTOLIC BLOOD PRESSURE: 60 MMHG | HEART RATE: 56 BPM | BODY MASS INDEX: 35.08 KG/M2 | SYSTOLIC BLOOD PRESSURE: 90 MMHG

## 2022-09-09 DIAGNOSIS — E78.5 DYSLIPIDEMIA: ICD-10-CM

## 2022-09-09 DIAGNOSIS — I25.118 CORONARY ARTERY DISEASE OF NATIVE ARTERY OF NATIVE HEART WITH STABLE ANGINA PECTORIS (HCC): Primary | ICD-10-CM

## 2022-09-09 DIAGNOSIS — I10 ESSENTIAL HYPERTENSION: ICD-10-CM

## 2022-09-09 PROCEDURE — 99214 OFFICE O/P EST MOD 30 MIN: CPT | Performed by: NURSE PRACTITIONER

## 2022-09-09 RX ORDER — CLOPIDOGREL BISULFATE 75 MG/1
75 TABLET ORAL DAILY
Qty: 30 TABLET | Refills: 5 | Status: SHIPPED | OUTPATIENT
Start: 2022-09-09

## 2022-09-09 ASSESSMENT — ENCOUNTER SYMPTOMS
ORTHOPNEA: 0
SHORTNESS OF BREATH: 1

## 2022-09-09 NOTE — PATIENT INSTRUCTIONS
Please be informed that if you contact our office outside of normal business hours the physician on call cannot help with any scheduling or rescheduling issues, procedure instruction questions or any type of medication issue. We advise you for any urgent/emergency that you go to the nearest emergency room! PLEASE CALL OUR OFFICE DURING NORMAL BUSINESS HOURS    Monday - Friday   8 am to 5 pm    [address]: [phone]    [place]:  [phone]  **It is YOUR responsibilty to bring medication bottles and/or updated medication list to [address] [place] [address]. This will allow us to better serve you and all your healthcare needs**  [facility] Locations - No appointment necessary. Sites open Monday to Friday. Call your preferred location for test preparation, business hours and other information you need. [employer] accepts [employer]. [facility]   [facility] [address] Mora Mcdonnell, [address] [facility]  Phone: [phone] [facility] [address] [facility]  [address].   [facility] [place], [address] [facility]  Phone: [phone]

## 2022-09-09 NOTE — PROGRESS NOTES
9/9/2022  Primary cardiologist: Dr. Rancho Jackson:   Cheri Rdz  is an established 60 y.o.  male here for follow up on [place] Cardiolite       SUBJECTIVE/OBJECTIVE:  Cheri Rdz is a 60 y.o. male with a history of non-STEMI, multiple PCIs, hypertension, dyslipidemia and remote GIB    He underwent PCI of RCA (June 2017), PCI of RCA  and POBA of distal PDA ( May 2018),  He then returned to the Cath Lab in  May 2018 for PCI of mid LAD. He then presented to the ED in July with chest pain/ [place]. He was taken to cath lab and he was noted mid LAD stent was occluded with collaterals from left to right. D1 with 80% stenosis in the proximal segment. Unfortunately the LAD was not able to be cannulated and it was abandoned. The RCA was noted to have 70% stenosis and he continued to have ongoing chest pain hence underwent PCI    HPI :   Cheri Rdz reports he has episodes of dizziness that he feels are ongoing. They occur with position change. Dizziness has been ongoing for at least 6 months. He notes occasional episodes of chest pain -he describes as a heavy to a dull sensation- non radiating-  He has used nitroglycerin for some relief. He also reports that when he lays down to rest the pain will go away. Pain can lasts for few minutes. He voices concern over the finances and being able to afford medications. Review of Systems   Constitutional: Negative for diaphoresis and malaise/fatigue. Cardiovascular:  Positive for chest pain. Negative for claudication, dyspnea on exertion, irregular heartbeat, leg swelling, near-syncope, orthopnea, palpitations and paroxysmal nocturnal dyspnea. Respiratory:  Positive for shortness of breath. Neurological:  Positive for dizziness. Negative for light-headedness. Vitals:    09/09/22 1457   BP: 90/60   Site: Left Upper Arm   Position: Sitting   Cuff Size: Large Adult   Pulse: 56   Weight: 259 lb (117.5 kg)   Height: 6' (1.829 m)     No flowsheet data found.   Wt Readings from Last 3 Encounters: 09/09/22 259 lb (117.5 kg)   08/26/22 258 lb (117 kg)   08/11/22 255 lb 9.6 oz (115.9 kg)     Body mass index is 35.13 kg/m². Physical Exam  Vitals reviewed. HENT:      Head: Normocephalic. Eyes:      Extraocular Movements: Extraocular movements intact. Pupils: Pupils are equal, round, and reactive to light. Neck:      Vascular: No carotid bruit. Cardiovascular:      Rate and Rhythm: Regular rhythm. Bradycardia present. Pulses: Normal pulses. Pulmonary:      Effort: Pulmonary effort is normal.      Breath sounds: Normal breath sounds. Abdominal:      General: There is no distension. Palpations: Abdomen is soft. Tenderness: There is no abdominal tenderness. Musculoskeletal:         General: Tenderness (chest wall) present. Normal range of motion. Cervical back: No tenderness. Right lower leg: Edema present. Left lower leg: Edema present. Skin:     General: Skin is warm and dry. Capillary Refill: Capillary refill takes less than 2 seconds. Neurological:      General: No focal deficit present. Mental Status: He is alert and oriented to person, place, and time. Psychiatric:         Mood and Affect: Mood normal.         Behavior: Behavior normal.              Current Outpatient Medications   Medication Sig Dispense Refill    clopidogrel (PLAVIX) 75 MG tablet Take 1 tablet by mouth daily 30 tablet 5    furosemide (LASIX) 20 MG tablet Take 1 tablet by mouth daily 30 tablet 1    potassium chloride (KLOR-CON M) 20 MEQ extended release tablet Take 1 tablet by mouth daily 30 tablet 1    traZODone (DESYREL) 100 MG tablet Take 1 tablet by mouth nightly 30 tablet 0    doxazosin (CARDURA) 4 MG tablet Take 4 mg by mouth in the morning and 4 mg in the evening. 2 tablets twice a day.       traZODone (DESYREL) 50 MG tablet Take 1 tablet by mouth nightly 30 tablet 0    lisinopril (PRINIVIL;ZESTRIL) 20 MG tablet Take 1 tablet by mouth in the morning and at bedtime 180 tablet 3    tamsulosin (FLOMAX) 0.4 MG capsule Take 2 capsules by mouth at bedtime 30 capsule 3    metoprolol tartrate (LOPRESSOR) 50 MG tablet TAKE 1 TABLET BY MOUTH TWICE DAILY HOLD  IF  SBP <110 AND HR <65 CALL CARDIOLOGY 180 tablet 0    finasteride (PROSCAR) 5 MG tablet Take 1 tablet by mouth in the morning. 90 tablet 1    isosorbide mononitrate (IMDUR) 30 MG extended release tablet Take 1 tablet by mouth in the morning. 30 tablet 3    atorvastatin (LIPITOR) 80 MG tablet Take 1 tablet by mouth nightly 30 tablet 3    aspirin 81 MG tablet Take 1 tablet by mouth daily 30 tablet 0    nitroGLYCERIN (NITROSTAT) 0.4 MG SL tablet up to max of 3 total doses. If no relief after 1 dose, call 911. 25 tablet 0    Multiple Vitamins-Minerals (MENS MULTIPLE VITAMIN/LYCOPENE) TABS Take  by mouth. No current facility-administered medications for this visit. All pertinent data reviewed and discussed with patient  Cardiolite 08/25/2022  Supervising physician Dr. eBlkis Khan .    Anterior wall hypokinesis with EF of 47 %    Large area of anterior and septal wall severe ischemia    Abnormal stress test        ASSESSMENT/PLAN:    Coronary artery disease  Tom Newsome is status post PCI of the RCA. He is known to have chronic occlusion of the LAD with collateral.  Recently underwent stress test for reevaluation post PCI and was noted to have anterior wall hypokinesis with a large area of anterior septal wall ischemia. Recommend medical management as LAD is  and unable to be opened. He does have collateral circulation on the left to the right. Unfortunately he reports he is unable to hold the Brilinta hence will change to Plavix.   He did apply for patient assistance to get Brilinta however has not heard back yet rather he has been approved and with the weekend coming up I do not want him without his antiplatelet therapy - stressed the importance of compliance with DAPT therapy - Plavix and aspirin  Continue lipitor, metoprolol and Imdur  He does have chest pain that is reproducible to touch-most likely musculoskeletal in nature. Can try Ranexa to assist with chest pain as well however blood pressure is soft and not able to add at this time. Hypertension  Blood pressure soft today which may also be causing some dizziness. Encouraged to keep self well-hydrated /rest when needed. Uncertain of medications- will need to check with pharmacy regarding doses    Reported on cardura/ lisinopril/Flomax/ proscar      Hyperlipidemia   Lipids noted from -07/19/2022  HDL los 31  LDL 69 : at goal  Continue atorvastatin       This case was discussed with Dr Anup Del Valle : reviewed LHC/ and stress test: he is in agreement with plan     Tests ordered:  none  Follow-up  3 mo or sooner if needed      Signed:  JASPAL Leyva CNP, 9/9/2022, 9:58 PM    An electronic signature was used to authenticate this note. Please note this report has been partially produced using speech recognition software and may contain errors related to that system including errors in grammar, punctuation, and spelling, as well as words and phrases that may be inappropriate. If there are any questions or concerns please feel free to contact the dictating provider for clarification.

## 2022-09-09 NOTE — PROGRESS NOTES
Vein \"LEG PROBLEM Questionnaire\"  Do you have prominent leg veins? No   Do you have any skin discoloration? Yes  Do you have any healed or active sores? No  Do you have swelling of the legs? Yes  Do you have a family history of varicose veins? No  Does your profession involve pro-longed        standing or heavy lifting? Yes  7. Have you been fighting overweight problems? Yes  8. Do you have restless legs? Yes  9. Do you have any night time cramps? Yes  10. Do you have any of the following in your legs:        Aching and Tiredness I  11. If Yes - Have they worn compression stockings No  12.  If they have worn compression stockings

## 2022-09-12 ENCOUNTER — TELEPHONE (OUTPATIENT)
Dept: CARDIOLOGY CLINIC | Age: 62
End: 2022-09-12

## 2022-09-12 NOTE — TELEPHONE ENCOUNTER
2022 Patient assistance application complete, scanned into [employer] and faxed to [employer] for [address]. Awaiting response.

## 2022-09-15 NOTE — TELEPHONE ENCOUNTER
Called to check status of application. Was placed on hold for 15 minutes and then left message for [place] to return my call. Awaiting response.

## 2022-09-26 RX ORDER — TAMSULOSIN HYDROCHLORIDE 0.4 MG/1
0.8 CAPSULE ORAL NIGHTLY
Qty: 180 CAPSULE | Refills: 3 | OUTPATIENT
Start: 2022-09-26

## 2022-09-26 RX ORDER — NITROGLYCERIN 0.4 MG/1
TABLET SUBLINGUAL
Qty: 25 TABLET | Refills: 0 | Status: SHIPPED | OUTPATIENT
Start: 2022-09-26

## 2022-09-28 ENCOUNTER — TELEPHONE (OUTPATIENT)
Dept: CARDIOLOGY CLINIC | Age: 62
End: 2022-09-28

## 2022-11-02 ENCOUNTER — COMMUNITY OUTREACH (OUTPATIENT)
Dept: FAMILY MEDICINE CLINIC | Age: 62
End: 2022-11-02

## 2022-12-09 RX ORDER — NITROGLYCERIN 0.4 MG/1
0.4 TABLET SUBLINGUAL EVERY 5 MIN PRN
Qty: 25 TABLET | Refills: 0 | Status: SHIPPED | OUTPATIENT
Start: 2022-12-09

## 2022-12-12 ENCOUNTER — TELEPHONE (OUTPATIENT)
Dept: CARDIOLOGY CLINIC | Age: 62
End: 2022-12-12

## 2022-12-12 ENCOUNTER — TELEPHONE (OUTPATIENT)
Dept: FAMILY MEDICINE CLINIC | Age: 62
End: 2022-12-12

## 2022-12-12 RX ORDER — ISOSORBIDE MONONITRATE 30 MG/1
30 TABLET, EXTENDED RELEASE ORAL DAILY
Qty: 90 TABLET | Refills: 3 | Status: SHIPPED | OUTPATIENT
Start: 2022-12-12

## 2022-12-12 NOTE — TELEPHONE ENCOUNTER
SUSAN: Radha Duvall stated the way that the patient was found hey suspect it was COVID, especially when she went to embalm him, she didn't know if you knew or not she wanted to included COVID on patients death certificate

## 2022-12-12 NOTE — TELEPHONE ENCOUNTER
Isabelle Apodaca from the corner office called stating that the patient was found  in his home. Patient was found collapsed on 12/10/22 time of death 7:12 pm. No trama, nothing suspicious. Isabelle Apodaca would like to know if you would sound the death certificate.  Please advise

## 2022-12-12 NOTE — TELEPHONE ENCOUNTER
----- Message from [place] sent at 12/12/2022  2:01 PM EST -----  Subject: Message to Provider    QUESTIONS  Information for Provider? Mary Dietz is calling from [facility]   home. She is wanting to let pt pcp know that he past away at his home on   December 10th @19:12pm. Mary Dietz is wanting to know if he had COVID was his   pcp know he had COVID.   ---------------------------------------------------------------------------  --------------  [address]  [phone]; Do not leave any message, patient will call back for answer  ---------------------------------------------------------------------------  --------------  SCRIPT ANSWERS  Relationship to Patient? Third Party  Third Party Type? Other  Other Third Party Type? [facility] home   Representative Name?  Mary Klinejsoe

## 2022-12-16 NOTE — PROGRESS NOTES
Nephrology Progress Note  8/26/2021 2:03 PM  Subjective: Interval History: Kishore Grier is a 57 y.o. male with improved shortness of breath and weakness        Data:   Scheduled Meds:   sodium chloride flush  5-40 mL IntraVENous BID    heparin (porcine)  5,000 Units Subcutaneous 3 times per day    aspirin  81 mg Oral Daily    finasteride  5 mg Oral Daily    atorvastatin  40 mg Oral Daily    amLODIPine  10 mg Oral Daily    metoprolol tartrate  50 mg Oral BID    clopidogrel  75 mg Oral Daily    sodium chloride flush  5-40 mL IntraVENous 2 times per day    cloNIDine  0.1 mg Oral BID     Continuous Infusions:   sodium chloride      niCARdipine Stopped (08/25/21 2306)    sodium chloride           CBC   Recent Labs     08/25/21  0000 08/26/21  0757   WBC 5.1 7.3   HGB 16.9 17.2   HCT 51.7 51.0    231      BMP   Recent Labs     08/25/21  0000 08/26/21  0757    136   K 4.3 4.1    101   CO2 24 24   BUN 17 14   CREATININE 1.9* 1.7*     Hepatic:   Recent Labs     08/25/21  0000   AST 19   ALT 19   BILITOT 0.3   ALKPHOS 83     Troponin: No results for input(s): TROPONINI in the last 72 hours. BNP: No results for input(s): BNP in the last 72 hours.   Lipids:   Recent Labs     08/25/21  0000   CHOL 206*   HDL 33*     ABGs: No results found for: PHART, PO2ART, CYA5SUF  INR:   Recent Labs     08/25/21  0000   INR 0.97     Renal Labs  Albumin:    Lab Results   Component Value Date    LABALBU 4.6 08/25/2021     Calcium:    Lab Results   Component Value Date    CALCIUM 9.3 08/26/2021     Phosphorus:    Lab Results   Component Value Date    PHOS 3.3 05/23/2018     U/A:    Lab Results   Component Value Date    NITRU NEGATIVE 08/25/2021    NITRU Negative 09/22/2017    COLORU YELLOW 08/25/2021    PHUR 6.5 08/01/2019    PHUR 6.0 09/22/2017    WBCUA 11 08/25/2021    RBCUA 1 08/25/2021    MUCUS RARE 08/25/2021    TRICHOMONAS NONE SEEN 08/25/2021    BACTERIA NEGATIVE 08/25/2021    CLARITYU CLEAR 08/25/2021    SPECGRAV 1.013 08/25/2021    UROBILINOGEN NEGATIVE 08/25/2021    BILIRUBINUR NEGATIVE 08/25/2021    BILIRUBINUR negative 08/01/2019    BLOODU NEGATIVE 08/25/2021    GLUCOSEU negative 08/01/2019    GLUCOSEU Negative 09/22/2017    KETUA NEGATIVE 08/25/2021     ABG:  No results found for: PHART, GWC6VIH, PO2ART, QPJ9FBN, BEART, THGBART, OOH7KIS, Q0BKEMRN  HgBA1c:    Lab Results   Component Value Date    LABA1C 5.1 08/25/2021     Microalbumen/Creatinine ratio:  No components found for: RUCREAT  TSH:    Lab Results   Component Value Date    TSH 1.08 10/22/2014     IRON:  No results found for: IRON  Iron Saturation:  No components found for: PERCENTFE  TIBC:  No results found for: TIBC  FERRITIN:  No results found for: FERRITIN  RPR:  No results found for: RPR  ZACH:  No results found for: ANATITER, ZACH  24 Hour Urine for Creatinine Clearance:  No components found for: CREAT4, UHRS10, UTV10      Objective:   I/O: 08/25 0701 - 08/26 0700  In: -   Out: 450 [Urine:450]  I/O last 3 completed shifts:  In: -   Out: 450 [Urine:450]  No intake/output data recorded. Vitals: /81   Pulse 57   Temp 97.6 °F (36.4 °C) (Oral)   Resp 16   Ht 6' (1.829 m)   Wt 250 lb (113.4 kg)   SpO2 98%   BMI 33.91 kg/m²  {  General appearance: awake weak  HEENT: Head: Normal, normocephalic, atraumatic. Neck: supple, symmetrical, trachea midline  Lungs: diminished breath sounds bilaterally  Heart: S1, S2 normal  Abdomen: abnormal findings:  soft nt  Extremities: edema trace  Neurologic: Mental status: alertness: alert        Assessment and Plan:      IMP:  1. Chest pain with family history of coronary disease  2. Hypertension controlled  3. Shortness of breath  4. CKD 3 versus acute renal failure   #5 anxiety    Plan     #1 cardiology on case and monitoring with work-up  2. Blood pressure overall improved today  3. Oxygenation appears holding stable  4. Creatinine 1.7 improved today we will monitor  5.   Affect improved  6. May need laxatives of worsening constipation  7.   We will monitor for now           Conchita Morales MD, MD Cephalexin Counseling: I counseled the patient regarding use of cephalexin as an antibiotic for prophylactic and/or therapeutic purposes. Cephalexin (commonly prescribed under brand name Keflex) is a cephalosporin antibiotic which is active against numerous classes of bacteria, including most skin bacteria. Side effects may include nausea, diarrhea, gastrointestinal upset, rash, hives, yeast infections, and in rare cases, hepatitis, kidney disease, seizures, fever, confusion, neurologic symptoms, and others. Patients with severe allergies to penicillin medications are cautioned that there is about a 10% incidence of cross-reactivity with cephalosporins. When possible, patients with penicillin allergies should use alternatives to cephalosporins for antibiotic therapy.